# Patient Record
Sex: FEMALE | Race: WHITE | NOT HISPANIC OR LATINO | Employment: OTHER | URBAN - METROPOLITAN AREA
[De-identification: names, ages, dates, MRNs, and addresses within clinical notes are randomized per-mention and may not be internally consistent; named-entity substitution may affect disease eponyms.]

---

## 2017-01-18 DIAGNOSIS — I10 ESSENTIAL HYPERTENSION: ICD-10-CM

## 2017-01-24 RX ORDER — AMLODIPINE BESYLATE 2.5 MG/1
TABLET ORAL
Qty: 90 TAB | Refills: 3 | Status: SHIPPED | OUTPATIENT
Start: 2017-01-24 | End: 2018-11-27 | Stop reason: SDUPTHER

## 2017-02-13 DIAGNOSIS — I10 ESSENTIAL HYPERTENSION: ICD-10-CM

## 2017-04-25 ENCOUNTER — OFFICE VISIT (OUTPATIENT)
Dept: VASCULAR LAB | Facility: MEDICAL CENTER | Age: 57
End: 2017-04-25
Attending: INTERNAL MEDICINE
Payer: COMMERCIAL

## 2017-04-25 VITALS
DIASTOLIC BLOOD PRESSURE: 80 MMHG | HEIGHT: 67 IN | BODY MASS INDEX: 26.13 KG/M2 | WEIGHT: 166.5 LBS | SYSTOLIC BLOOD PRESSURE: 126 MMHG | HEART RATE: 61 BPM

## 2017-04-25 DIAGNOSIS — I77.71 CAROTID ARTERY DISSECTION (HCC): ICD-10-CM

## 2017-04-25 DIAGNOSIS — E78.5 DYSLIPIDEMIA: ICD-10-CM

## 2017-04-25 DIAGNOSIS — I10 ESSENTIAL HYPERTENSION: ICD-10-CM

## 2017-04-25 PROCEDURE — 99212 OFFICE O/P EST SF 10 MIN: CPT | Performed by: NURSE PRACTITIONER

## 2017-04-25 PROCEDURE — 99213 OFFICE O/P EST LOW 20 MIN: CPT | Performed by: NURSE PRACTITIONER

## 2017-04-25 ASSESSMENT — ENCOUNTER SYMPTOMS
BLOOD IN STOOL: 0
FOCAL WEAKNESS: 0
PALPITATIONS: 0
NERVOUS/ANXIOUS: 0
HEMOPTYSIS: 0
COUGH: 0
DIZZINESS: 0
HEADACHES: 0
BRUISES/BLEEDS EASILY: 0
SHORTNESS OF BREATH: 0
LOSS OF CONSCIOUSNESS: 0
DEPRESSION: 0
WHEEZING: 0
MYALGIAS: 0

## 2017-04-25 NOTE — MR AVS SNAPSHOT
"Juan Luis Rolon   2017 1:40 PM   Office Visit   MRN: [id number]    Department:  Vascular Medicine   Dept Phone:  [phone]    Description:  Female : 1960   Provider:  ARI Person           Reason for Visit     Follow-Up           Allergies as of 2017     Allergen Noted Reactions    Codeine 2013   Vomiting    Morphine 2013   Vomiting    Plavix [Clopidogrel Bisulfate] 2013   Hives    Warfarin 2014   Hives      You were diagnosed with     Carotid artery dissection (CMS-HCC)   [173378]       Essential hypertension   [9003806]       Dyslipidemia   [446588]         Vital Signs     Blood Pressure Pulse Height Weight Body Mass Index Smoking Status    126/80 mmHg 61 1.702 m (5' 7.01\") 75.524 kg (166 lb 8 oz) 26.07 kg/m2 Never Smoker       Basic Information     Date Of Birth Sex Race Ethnicity Preferred Language    1960 Female White Non- English      Your appointments     Oct 24, 2017  1:00 PM   Follow Up Visit with VASCULAR NURSE PRACTITIONER   [facility]  (--)    [address] [place] [id number]   [phone]              Problem List              ICD-10-CM Priority Class Noted - Resolved    Carotid artery dissection (CMS-HCC) I77.71   2014 - Present    Essential hypertension I10   2014 - Present    Dyslipidemia E78.5   2014 - Present      Health Maintenance        Date Due Completion Dates    IMM DTaP/Tdap/Td Vaccine (1 - Tdap) 1977 ---    PAP SMEAR 1979 ---    MAMMOGRAM 1998 ---    COLONOSCOPY 2008 ---            Current Immunizations     No immunizations on file.      Below and/or attached are the medications your provider expects you to take. Review all of your home medications and newly ordered medications with your provider and/or pharmacist. Follow medication instructions as directed by your provider and/or pharmacist. Please keep your " medication list with you and share with your provider. Update the information when medications are discontinued, doses are changed, or new medications (including over-the-counter products) are added; and carry medication information at all times in the event of emergency situations     Allergies:  CODEINE - Vomiting     MORPHINE - Vomiting     PLAVIX - Hives     WARFARIN - Hives               Medications  Valid as of: April 25, 2017 -  2:06 PM    Generic Name Brand Name Tablet Size Instructions for use    Acyclovir (Tab) ZOVIRAX 800 MG         Adapalene (Gel) Adapalene 0.3 %         ALPRAZolam (Tab) XANAX 1 MG         AmLODIPine Besylate (Tab) NORVASC 2.5 MG TAKE 1 TABLET BY MOUTH EVERY DAY        Aspirin (Tab)  MG Take 325 mg by mouth every 6 hours as needed.        Atenolol (Tab) TENORMIN 50 MG TAKE 1 TABLET BY MOUTH DAILY        BuPROPion HCl (TABLET SR 12 HR) WELLBUTRIN- MG Take 150 mg by mouth every day.        Clindamycin Phosphate (Gel) CLEOCIN T 1 %         Fluticasone Propionate (Suspension) FLONASE 50 MCG/ACT         L-Methylfolate-Algae (Cap) DEPLIN 15 15-90.314 MG         Losartan Potassium (Tab) COZAAR 25 MG Take 1 Tab by mouth every day.        Omega-3 Fatty Acids (Cap) OMEGA 3 FA 1000 MG Take 1,000 mg by mouth every day.        Penciclovir (Cream) DENAVIR 1 %         Policos-Garlic-Plant Sterols   Take 2 Caps by mouth every day.        Red Yeast Rice Extract   Take  by mouth every day.        Spironolactone-HCTZ (Tab) ALDACTAZIDE 25-25 MG Take 0.5 Tabs by mouth every day.        Terazosin HCl (Cap) HYTRIN 1 MG Take 1 Cap by mouth every day.        Zolpidem Tartrate (Tab CR) AMBIEN CR 12.5 MG         .                 Medicines prescribed today were sent to:     [facility][id number] - [place], [place] - [address]    [address] [facility] [address]    Phone: [phone] Fax: [phone]    Open 24 Hours?: No      Medication refill instructions:       If your prescription  bottle indicates you have medication refills left, it is not necessary to call your provider’s office. Please contact your pharmacy and they will refill your medication.    If your prescription bottle indicates you do not have any refills left, you may request refills at any time through one of the following ways: The online [employer] system (except Urgent Care), by calling your provider’s office, or by asking your pharmacy to contact your provider’s office with a refill request. Medication refills are processed only during regular business hours and may not be available until the next business day. Your provider may request additional information or to have a follow-up visit with you prior to refilling your medication.   *Please Note: Medication refills are assigned a new Rx number when refilled electronically. Your pharmacy may indicate that no refills were authorized even though a new prescription for the same medication is available at the pharmacy. Please request the medicine by name with the pharmacy before contacting your provider for a refill.        Your To Do List     Future Labs/Procedures Complete By Expires    CAROTID DUPLEX  10/1/2017 10/31/2017    COMP METABOLIC PANEL  As directed 4/25/2018    LIPID PROFILE  As directed 4/25/2018         [employer] Access Code: Activation code not generated  Current [employer] Status: Active

## 2017-04-25 NOTE — PROGRESS NOTES
"VASCULAR FOLLOW UP VISIT  Subjective:   Juan Luis Rolon is a 57 y.o. female who presents today 4/25/17 for   Chief Complaint   Patient presents with   • Follow-Up     HPI:   Pt presents for f/u on carotid disease with dissection, HTN, dyslipidemia, and previous TIA.    No c/o visual changes or stroke symptoms   No chest pain, SOB, palpitations, or LE swelling  Has never had migraines since TIA  Home BP's 120/70's, HR 60's- takes them rarely  Continues on red rice yeast, without myalgias  No labwork    Social History   Substance Use Topics   • Smoking status: Never Smoker    • Smokeless tobacco: Never Used   • Alcohol Use: 3.5 oz/week     7 Glasses of wine per week     DIET AND EXERCISE:  Weight Change: down 9 lbs in past 6mo  Diet: common adult  Exercise: moderate regular exercise program- [employer]     Review of Systems   Constitutional: Negative for malaise/fatigue.   HENT: Negative for nosebleeds.    Respiratory: Negative for cough, hemoptysis, shortness of breath and wheezing.    Cardiovascular: Negative for chest pain, palpitations and leg swelling.   Gastrointestinal: Negative for blood in stool.   Genitourinary: Negative for hematuria.   Musculoskeletal: Negative for myalgias.   Neurological: Negative for dizziness, focal weakness, loss of consciousness and headaches.   Endo/Heme/Allergies: Does not bruise/bleed easily.   Psychiatric/Behavioral: Negative for depression. The patient is not nervous/anxious.       Objective:     Filed Vitals:    04/25/17 1353   BP: 126/80   Pulse: 61   Height: 1.702 m (5' 7.01\")   Weight: 75.524 kg (166 lb 8 oz)      Body mass index is 26.07 kg/(m^2).  Physical Exam   Constitutional: She is oriented to person, place, and time. She appears well-developed and well-nourished.   Cardiovascular: Normal rate, regular rhythm, normal heart sounds and intact distal pulses.    No murmur heard.  Pulmonary/Chest: Effort normal and breath sounds normal. No respiratory distress. She has no " wheezes.   Musculoskeletal: Normal range of motion. She exhibits no edema.   Neurological: She is alert and oriented to person, place, and time.   Skin: Skin is warm and dry.   Psychiatric: She has a normal mood and affect. Her behavior is normal. Thought content normal.     DATA REVIEW    Labwork 6-24-14: LDL-C, 86, tsh 2.27, urine for ma 0.5, GFR > 60, HDL 73, trig 127    Labwork 6-23-15: LDL-C, 97, HDL-C, 79, Trig- 242, Non HDL-145, TSH-1.92, Na-128, K-4.2, Bun/Cr, 15/0.8, ALT 29, AST 41, Microalbumin-1.4, Urine Creatinine, 37.4          Carotid Duplex 12-5-2014  1. Chronic occlusion of the left internal carotid artery at its origin. Patent left vertebral artery.  2. Minimal heterogeneous plaque in the right carotid bulb and proximal right internal carotid artery. No right internal carotid artery rate limiting stenosis.    Carotid duplex 8-  CONCLUSIONS  1. Normal right carotid duplex examination.   2. Left internal carotid occlusion.   3. Normal bilateral subclavian and vertebral arterial exam.    CTA Oct 2015:   1. Left internal carotid artery occlusion  2. CTA [place] otherwise within normal limits  3. Probable left deep gray matter venous angioma, incidental  4. Underlying white matter changes are likely from previous small vessel ischemia/infarct    Blood work Oct 2015  LDL 91, nonHdl 143, gfr 74, na 134    CTA October 2016  Persistent occlusion left internal carotid artery  Inferior left basal ganglia and medial left temporal lobe venous angioma  Small vessel disease    Blood work September 2016-sodium 136, potassium 4.6, ALT 19, AST 26, total social to 24, temperature it's 237, HDL 70, , TSH 1.47, free T4 0.89          Medical Decision Making:  Today's Assessment / Status / Plan:     1. Carotid artery dissection (CMS-HCC)  CBC, NO DIFFERENTIAL/PLATELET (HEMOGRAM)    CAROTID DUPLEX   2. Essential hypertension  COMP METABOLIC PANEL    CBC, NO DIFFERENTIAL/PLATELET (HEMOGRAM)   3.  "Dyslipidemia  LIPID PROFILE     Patient Type: Secondary Prevention    Etiology of Established CVD if Present:   1) Carotid artery dissection  2) Presumed TIA, June 2015    Lipid Management: Qualifies for Statin Therapy Based on 2013 ACC/AHA Guidelines: yes  Calculated 10-Year Risk of ASCVD: N/A  Currently on Statin: No  Patient qualifies for statin therapy based on ACC/AHA guidelines as well as presence of vascular disease, however,she states she has not tolerated statins well in past, and refuses any additional medications for lipid management.  Had myalgias on statins in the past.     Previous lipid panel shows only modestly increased non-HDL  - Continue TLC   - Continue red rice yeast  - Repeat fasting lipids     Blood Pressure Management:Goal: JNC8 (2013) Office BP Goal:<140/90; Under Control: Yes-   Good control on previous abpm  No secondary cause identified.    Patient stopped Diovan weeks ago due to fogginess and dizziness - tolerates low dose losartan  Had leg swelling on higher doses of Norvasc.  Plan:  - Continue atenolol 50 mg daily  - Continue amlodipine 2.5 mg daily  - Continue spironolactone hydrochlorothiazide one half pill daily  - Continue terazosin 1mg qhs  - Continue losartan 25 mg daily (patient does not want to increase dose)  - Continue home BP monitoring - call if elevated    Glycemic Status: Normal    Anti-Platelet/Anti-Coagulant Tx: yes  Patient states she cannot tolerate clopidogrel or aggrenox  - continue aspirin daily    Smoking: continue complete avoidance     Physical Activity: Continue training regimen with cross-fit    Weight Management and Nutrition: Dietary plan was discussed at length with patient at this visit including DASH Diet, choosing whole grains/whole wheats over \"white flours,\" continued fish, choosing healthy fats, and avoiding trans fats.    Other:     1. Carotid dissection with history of TIA and unilateral carotid occlusion, workup for vasculitis by rheumatology was " negative and no evidence of fibromuscular dysplasia in renal arteries. Continue antiplatelet therapy, risk factor modification, and surveillance.   2.  TIA  with occular symptoms- No recurrence.  Continue asa and RF mod  3. Atypical chest pain with negative workup including workup for ischemia and CT scan of the chest. Symptoms have resolved. No further workup indicated at this time   4. Ocular migraine, improved-continue conservative therapy   5. Anxiety and multiple psychosocial issues- defer management to PCP and psychologists/psychiatrist  6.  Hyponatremia, previous significant improvement with reduction in diuretic dose. Continue current dose of diuretic and recheck BMP  7.  Venous angioma-Asymptomatic. Stable on CTA    Instructed to follow-up with PCP for remainder of adult medical needs: yes  We will partner with other providers in the management of established vascular disease and cardiometabolic risk factors.    Studies to Be Obtained: Carotid duplex Oct 2017-ordered today    Labs to Be Obtained: lipids, CMP, CBC    Follow up in: 6 months    ARI Person     CC:  SOPHIE Burton MD

## 2017-08-30 ENCOUNTER — TELEPHONE (OUTPATIENT)
Dept: VASCULAR LAB | Facility: MEDICAL CENTER | Age: 57
End: 2017-08-30

## 2017-08-30 DIAGNOSIS — I77.71 CAROTID ARTERY DISSECTION (HCC): ICD-10-CM

## 2017-10-16 ENCOUNTER — HOSPITAL ENCOUNTER (OUTPATIENT)
Dept: RADIOLOGY | Facility: MEDICAL CENTER | Age: 57
End: 2017-10-16
Attending: NURSE PRACTITIONER
Payer: COMMERCIAL

## 2017-10-16 DIAGNOSIS — I77.71 CAROTID ARTERY DISSECTION (HCC): ICD-10-CM

## 2017-10-16 PROCEDURE — 93880 EXTRACRANIAL BILAT STUDY: CPT

## 2017-10-24 ENCOUNTER — OFFICE VISIT (OUTPATIENT)
Dept: VASCULAR LAB | Facility: MEDICAL CENTER | Age: 57
End: 2017-10-24
Attending: INTERNAL MEDICINE
Payer: COMMERCIAL

## 2017-10-24 VITALS
HEIGHT: 67 IN | DIASTOLIC BLOOD PRESSURE: 68 MMHG | SYSTOLIC BLOOD PRESSURE: 108 MMHG | WEIGHT: 164 LBS | HEART RATE: 60 BPM | BODY MASS INDEX: 25.74 KG/M2

## 2017-10-24 DIAGNOSIS — I10 ESSENTIAL HYPERTENSION: ICD-10-CM

## 2017-10-24 PROCEDURE — 99212 OFFICE O/P EST SF 10 MIN: CPT | Performed by: NURSE PRACTITIONER

## 2017-10-24 PROCEDURE — 99213 OFFICE O/P EST LOW 20 MIN: CPT | Performed by: NURSE PRACTITIONER

## 2017-10-24 ASSESSMENT — ENCOUNTER SYMPTOMS
FOCAL WEAKNESS: 0
LOSS OF CONSCIOUSNESS: 0
HEADACHES: 0
WEIGHT LOSS: 0
WHEEZING: 0
BRUISES/BLEEDS EASILY: 0
COUGH: 0
DIZZINESS: 0
SHORTNESS OF BREATH: 0
PALPITATIONS: 0
NERVOUS/ANXIOUS: 0
BLOOD IN STOOL: 0
MYALGIAS: 0

## 2017-10-24 NOTE — PROGRESS NOTES
"VASCULAR FOLLOW UP VISIT  Subjective:   Juan Luis Rolon is a 57 y.o. female who presents today 10/24/17 for   Chief Complaint   Patient presents with   • Follow-Up     HPI:   Pt presents for f/u on carotid disease with dissection, HTN, dyslipidemia, and previous TIA.    She has no new labwork since last appt  No c/o visual changes or stroke-like symptoms   No chest pain, SOB, palpitations, or LE swelling  She continues to workout daily at [employer]  Home BP's 120/70's, although she does take them often  Continues on red rice yeast without myalgias  On ASA without bleeding/bruising  Had carotid u/s earlier this month    Social History   Substance Use Topics   • Smoking status: Never Smoker   • Smokeless tobacco: Never Used   • Alcohol use 3.5 oz/week     7 Glasses of wine per week     DIET AND EXERCISE:  Weight Change: stable  Diet: common adult  Exercise: moderate regular exercise program- [employer] Fit     Review of Systems   Constitutional: Negative for malaise/fatigue and weight loss.   HENT: Negative for nosebleeds.    Respiratory: Negative for cough, shortness of breath and wheezing.    Cardiovascular: Negative for chest pain, palpitations and leg swelling.   Gastrointestinal: Negative for blood in stool.   Genitourinary: Negative for hematuria.   Musculoskeletal: Negative for myalgias.   Neurological: Negative for dizziness, focal weakness, loss of consciousness and headaches.   Endo/Heme/Allergies: Does not bruise/bleed easily.   Psychiatric/Behavioral: The patient is not nervous/anxious.       Objective:     Vitals:    10/24/17 1254 10/24/17 1259   BP: 115/71 108/68   Pulse: 60 60   Weight: 74.4 kg (164 lb)    Height: 1.702 m (5' 7\")       Body mass index is 25.69 kg/m².  Physical Exam   Constitutional: She is oriented to person, place, and time. She appears well-developed and well-nourished.   Cardiovascular: Normal rate, regular rhythm, normal heart sounds and intact distal pulses.    No murmur " heard.  Pulmonary/Chest: Effort normal and breath sounds normal. No respiratory distress. She has no wheezes.   Musculoskeletal: Normal range of motion. She exhibits no edema.   Neurological: She is alert and oriented to person, place, and time.   Skin: Skin is warm and dry.   Psychiatric: She has a normal mood and affect. Her behavior is normal. Thought content normal.     DATA REVIEW    Labwork 6-24-14: LDL-C, 86, tsh 2.27, urine for ma 0.5, GFR > 60, HDL 73, trig 127    Labwork 6-23-15: LDL-C, 97, HDL-C, 79, Trig- 242, Non HDL-145, TSH-1.92, Na-128, K-4.2, Bun/Cr, 15/0.8, ALT 29, AST 41, Microalbumin-1.4, Urine Creatinine, 37.4          Carotid Duplex 12-5-2014  1. Chronic occlusion of the left internal carotid artery at its origin. Patent left vertebral artery.  2. Minimal heterogeneous plaque in the right carotid bulb and proximal right internal carotid artery. No right internal carotid artery rate limiting stenosis.    Carotid duplex 8-  CONCLUSIONS  1. Normal right carotid duplex examination.   2. Left internal carotid occlusion.   3. Normal bilateral subclavian and vertebral arterial exam.    CTA Oct 2015:   1. Left internal carotid artery occlusion  2. CTA [place] otherwise within normal limits  3. Probable left deep gray matter venous angioma, incidental  4. Underlying white matter changes are likely from previous small vessel ischemia/infarct    Blood work Oct 2015  LDL 91, nonHdl 143, gfr 74, na 134    CTA October 2016  Persistent occlusion left internal carotid artery  Inferior left basal ganglia and medial left temporal lobe venous angioma  Small vessel disease    Blood work September 2016-sodium 136, potassium 4.6, ALT 19, AST 26, total social to 24, temperature it's 237, HDL 70, , TSH 1.47, free T4 0.89    Carotid Duplex 10/16/17:   Occlusion of the left internal carotid artery.   No significant stenosis of the right carotid or bilateral subclavian or    bilateral vertebral  arteries.          Medical Decision Making:  Today's Assessment / Status / Plan:     1. Essential hypertension  TSH+FREE T4     Patient Type: Secondary Prevention    Etiology of Established CVD if Present:   1) Carotid artery dissection  2) Presumed TIA, June 2015    Lipid Management: Qualifies for Statin Therapy Based on 2013 ACC/AHA Guidelines: yes  Calculated 10-Year Risk of ASCVD: N/A  Currently on Statin: No  Patient qualifies for statin therapy based on ACC/AHA guidelines as well as presence of vascular disease, however,she states she has not tolerated statins well in past, and refuses any additional medications for lipid management.  Previous lipid panel shows only modestly increased non-HDL, however, no new labwork for quite some time.  - Continue TLC   - Continue red rice yeast  - Repeat lipid panel once fasting    Blood Pressure Management:Goal: JNC8 (2013) Office BP Goal:<140/90; Under Control: Yes-   Good control on previous abpm  No secondary cause identified  Did not tolerate Diovan due to fogginess and dizziness - tolerates low dose losartan  Had leg swelling on higher doses of Norvasc.  Plan:  - Continue atenolol 50 mg daily  - Continue amlodipine 2.5 mg daily  - Continue spironolactone hydrochlorothiazide one half pill daily  - Continue terazosin 1mg qhs  - Continue losartan 25 mg daily  - Continue home BP monitoring - call if elevated    Glycemic Status: Normal    Anti-Platelet/Anti-Coagulant Tx: yes  Patient states she cannot tolerate clopidogrel or aggrenox  - continue aspirin daily    Smoking: continue complete avoidance     Physical Activity: Continue training regimen with cross-fit    Weight Management and Nutrition: Dietary plan was discussed with patient at this visit including DASH Diet, low carb/low sat fat and avoiding trans fats.    Other:     1. Carotid dissection with history of TIA and unilateral carotid occlusion, workup for vasculitis by rheumatology was negative and no evidence of  fibromuscular dysplasia in renal arteries. Continue antiplatelet therapy, risk factor modification, and surveillance. Most recent duplex shows stable occlusion and no significant stenosis on the right  2.  TIA  with occular symptoms- No recurrence.  Continue asa and RF mod  3. Atypical chest pain with negative workup including workup for ischemia and CT scan of the chest. Symptoms have resolved. No further workup indicated at this time   4. Ocular migraine, improved-continue conservative therapy   5. Anxiety and multiple psychosocial issues- defer management to PCP and psychologists/psychiatrist  6.  Hyponatremia, previous significant improvement with reduction in diuretic dose. Continue current dose of diuretic and recheck electrolytes  7.  Venous angioma-Asymptomatic. Stable on CTA    Instructed to follow-up with PCP for remainder of adult medical needs: yes  We will partner with other providers in the management of established vascular disease and cardiometabolic risk factors.    Studies to Be Obtained: Carotid duplex Oct 2018    Labs to Be Obtained: lipids, CMP, CBC, TSH now    Follow up in: 6 months    ARI Person     CC:  SOPHIE Burton MD

## 2018-03-07 DIAGNOSIS — I10 ESSENTIAL HYPERTENSION: ICD-10-CM

## 2018-03-09 RX ORDER — TERAZOSIN 1 MG/1
CAPSULE ORAL
Qty: 90 CAP | Refills: 3 | Status: SHIPPED | OUTPATIENT
Start: 2018-03-09 | End: 2019-04-05 | Stop reason: SDUPTHER

## 2018-04-23 DIAGNOSIS — I10 ESSENTIAL HYPERTENSION: ICD-10-CM

## 2018-04-23 RX ORDER — LOSARTAN POTASSIUM 25 MG/1
25 TABLET ORAL DAILY
Qty: 90 TAB | Refills: 3 | Status: SHIPPED
Start: 2018-04-23 | End: 2019-04-09 | Stop reason: SDUPTHER

## 2018-04-24 ENCOUNTER — OFFICE VISIT (OUTPATIENT)
Dept: VASCULAR LAB | Facility: MEDICAL CENTER | Age: 58
End: 2018-04-24
Attending: INTERNAL MEDICINE
Payer: COMMERCIAL

## 2018-04-24 VITALS
BODY MASS INDEX: 23.7 KG/M2 | HEART RATE: 58 BPM | SYSTOLIC BLOOD PRESSURE: 127 MMHG | DIASTOLIC BLOOD PRESSURE: 80 MMHG | WEIGHT: 151 LBS | HEIGHT: 67 IN

## 2018-04-24 DIAGNOSIS — E78.5 DYSLIPIDEMIA: ICD-10-CM

## 2018-04-24 DIAGNOSIS — I10 ESSENTIAL HYPERTENSION: ICD-10-CM

## 2018-04-24 DIAGNOSIS — I77.71 CAROTID ARTERY DISSECTION (HCC): ICD-10-CM

## 2018-04-24 PROCEDURE — 99214 OFFICE O/P EST MOD 30 MIN: CPT | Performed by: INTERNAL MEDICINE

## 2018-04-24 ASSESSMENT — ENCOUNTER SYMPTOMS
SHORTNESS OF BREATH: 0
FOCAL WEAKNESS: 0
LOSS OF CONSCIOUSNESS: 0
NERVOUS/ANXIOUS: 0
COUGH: 0
MYALGIAS: 0
PALPITATIONS: 0
DIZZINESS: 0
HEADACHES: 0
DEPRESSION: 0

## 2018-04-24 NOTE — PROGRESS NOTES
"VASCULAR FOLLOW UP VISIT  Subjective:   Juan Luis Rolon is a 57 y.o. female who presents today 4-24-18 for   Chief Complaint   Patient presents with   • Amb Vascular Reason For Visit     Follow up     HPI:   Pt presents for f/u on carotid disease with dissection, HTN, dyslipidemia, and previous TIA.   Takes red rice yeast  No new tia/cva symptoms  No headaches  BP at home usually around 130/75  Still on asa  Some lightheadedness when standing    Social History   Substance Use Topics   • Smoking status: Never Smoker   • Smokeless tobacco: Never Used   • Alcohol use 3.5 oz/week     7 Glasses of wine per week     DIET AND EXERCISE:  Weight Change: stable  Diet: common adult  Exercise: moderate regular exercise program- Cross Fit     Review of Systems   Constitutional: Negative for malaise/fatigue.   Respiratory: Negative for cough and shortness of breath.    Cardiovascular: Negative for chest pain, palpitations and leg swelling.   Musculoskeletal: Negative for myalgias.   Neurological: Negative for dizziness, focal weakness, loss of consciousness and headaches.   Psychiatric/Behavioral: Negative for depression. The patient is not nervous/anxious.       Objective:     Vitals:    04/24/18 0951   BP: 127/80   Pulse: (!) 58   Weight: 68.5 kg (151 lb)   Height: 1.702 m (5' 7\")      Body mass index is 23.65 kg/m².  Physical Exam   Constitutional: She is oriented to person, place, and time. She appears well-developed and well-nourished.   Cardiovascular: Normal rate, regular rhythm, normal heart sounds and intact distal pulses.    No murmur heard.  Pulmonary/Chest: Effort normal and breath sounds normal. No respiratory distress. She has no wheezes.   Musculoskeletal: Normal range of motion. She exhibits no edema.   Neurological: She is alert and oriented to person, place, and time.   Skin: Skin is warm and dry.   Psychiatric: She has a normal mood and affect. Her behavior is normal. Thought content normal.     DATA " REVIEW    Labwork 6-24-14: LDL-C, 86, tsh 2.27, urine for ma 0.5, GFR > 60, HDL 73, trig 127    Labwork 6-23-15: LDL-C, 97, HDL-C, 79, Trig- 242, Non HDL-145, TSH-1.92, Na-128, K-4.2, Bun/Cr, 15/0.8, ALT 29, AST 41, Microalbumin-1.4, Urine Creatinine, 37.4          Carotid Duplex 12-5-2014  1. Chronic occlusion of the left internal carotid artery at its origin. Patent left vertebral artery.  2. Minimal heterogeneous plaque in the right carotid bulb and proximal right internal carotid artery. No right internal carotid artery rate limiting stenosis.    Carotid duplex 8-  CONCLUSIONS  1. Normal right carotid duplex examination.   2. Left internal carotid occlusion.   3. Normal bilateral subclavian and vertebral arterial exam.    CTA Oct 2015:   1. Left internal carotid artery occlusion  2. CTA [place] otherwise within normal limits  3. Probable left deep gray matter venous angioma, incidental  4. Underlying white matter changes are likely from previous small vessel ischemia/infarct    Blood work Oct 2015  LDL 91, nonHdl 143, gfr 74, na 134    CTA October 2016  Persistent occlusion left internal carotid artery  Inferior left basal ganglia and medial left temporal lobe venous angioma  Small vessel disease    Blood work September 2016-sodium 136, potassium 4.6, ALT 19, AST 26, total social to 24, temperature it's 237, HDL 70, , TSH 1.47, free T4 0.89    Carotid Duplex 10/16/17:   Occlusion of the left internal carotid artery.   No significant stenosis of the right carotid or bilateral subclavian or    bilateral vertebral arteries.    Blood work nov 2017 [facility]:  GFR 57, , nonHDL 133, tsh 1.65          Medical Decision Making:  Today's Assessment / Status / Plan:     1. Essential hypertension  COMP METABOLIC PANEL   2. Carotid artery dissection (CMS-HCC)  Carotid Duplex   3. Dyslipidemia  COMP METABOLIC PANEL    LIPID PROFILE    TSH     Patient Type: Secondary Prevention    Etiology of  Established CVD if Present:   1) Carotid artery dissection  2) Presumed TIA, June 2015    Lipid Management: Qualifies for Statin Therapy Based on 2013 ACC/AHA Guidelines: yes  Calculated 10-Year Risk of ASCVD: N/A  Currently on Statin: No  Patient qualifies for statin therapy based on ACC/AHA guidelines given established ASCVD; however, it was non-atherosclerotic  has not tolerated statins well in past, and refuses any additional medications for lipid management.   Lipid panel with fairly decent nonHDL and LDL control  Plan.  - Continue TLC   - Continue red rice yeast  - Repeat lipid panel prior to next visit    Blood Pressure Management:  ACC/AHA BP goal <130/80  Reasonable control both at home and in office  Good control on previous abpm  No secondary cause identified  Did not tolerate Diovan due to fogginess and dizziness - tolerates low dose losartan  Had leg swelling on higher doses of Norvasc.  Plan:  - Continue atenolol 50 mg daily  - Continue amlodipine 2.5 mg daily  - Continue spironolactone hydrochlorothiazide one half pill daily  - Continue terazosin 1mg qhs  - Continue losartan 25 mg daily  - Continue home BP monitoring - call if elevated    Glycemic Status: Normal    Anti-Platelet/Anti-Coagulant Tx: yes  Patient states she cannot tolerate clopidogrel or aggrenox  - continue aspirin daily    Smoking: continue complete avoidance     Physical Activity: Continue training regimen with cross-fit    Weight Management and Nutrition: Dietary plan was discussed with patient at this visit including DASH Diet, low carb/low sat fat and avoiding trans fats.    Other:     1. Carotid dissection with history of TIA and unilateral carotid occlusion, workup for vasculitis by rheumatology was negative and no evidence of fibromuscular dysplasia in renal arteries. Continue antiplatelet therapy, risk factor modification, and surveillance. Most recent duplex shows stable occlusion and no significant stenosis on the right.   Repeat carotid duplex this october - if stable consider decreasing frequency to every 2 years  2.  TIA with occular symptoms- No recurrence.  Continue asa and RF mod  3. Atypical chest pain with negative workup including workup for ischemia and CT scan of the chest. Symptoms have resolved. No further workup indicated at this time   4. Ocular migraine, improved-continue conservative therapy   5.  Venous angioma-Asymptomatic. Stable on CTA.  Consider repeat imaging in future, particularly if headaches return.  Otherwise defer to pcp    Instructed to follow-up with PCP for remainder of adult medical needs: yes  We will partner with other providers in the management of established vascular disease and cardiometabolic risk factors.    Studies to Be Obtained: Carotid duplex Oct 2018    Labs to Be Obtained: as above prior to next visit    Follow up in: November after imaging and labs    Michael J Bloch, M.D.     CC:  SOPHIE Burton MD

## 2018-06-21 ENCOUNTER — TELEPHONE (OUTPATIENT)
Dept: VASCULAR LAB | Facility: MEDICAL CENTER | Age: 58
End: 2018-06-21

## 2018-06-21 NOTE — TELEPHONE ENCOUNTER
"Patient called in stating that her \"arm is bruised\" and her \"memory is foggy\". Spoke with [facility], patient is to go to  or ER right away. Patient states that she will not go to [facility] and can not drive to [place]. Patient will try and go to a [facility]  tomorrow.   "

## 2018-08-02 DIAGNOSIS — I10 ESSENTIAL HYPERTENSION: ICD-10-CM

## 2018-08-07 DIAGNOSIS — I10 ESSENTIAL HYPERTENSION: ICD-10-CM

## 2018-08-07 RX ORDER — ATENOLOL 50 MG/1
50 TABLET ORAL DAILY
Qty: 10 TAB | Refills: 0 | Status: SHIPPED | OUTPATIENT
Start: 2018-08-07 | End: 2019-09-16 | Stop reason: SDUPTHER

## 2018-08-07 RX ORDER — ATENOLOL 50 MG/1
50 TABLET ORAL DAILY
Qty: 90 TAB | Refills: 3 | Status: SHIPPED | OUTPATIENT
Start: 2018-08-07 | End: 2018-08-07 | Stop reason: SDUPTHER

## 2018-10-03 ENCOUNTER — TELEPHONE (OUTPATIENT)
Dept: VASCULAR LAB | Facility: MEDICAL CENTER | Age: 58
End: 2018-10-03

## 2018-10-03 ENCOUNTER — HOSPITAL ENCOUNTER (OUTPATIENT)
Dept: RADIOLOGY | Facility: MEDICAL CENTER | Age: 58
End: 2018-10-03
Attending: INTERNAL MEDICINE
Payer: COMMERCIAL

## 2018-10-03 DIAGNOSIS — I77.71 CAROTID ARTERY DISSECTION (HCC): ICD-10-CM

## 2018-10-03 PROCEDURE — 93880 EXTRACRANIAL BILAT STUDY: CPT

## 2018-10-04 ENCOUNTER — TELEPHONE (OUTPATIENT)
Dept: VASCULAR LAB | Facility: MEDICAL CENTER | Age: 58
End: 2018-10-04

## 2018-10-04 NOTE — TELEPHONE ENCOUNTER
LM on VM to let pt know that Dr. Bloch reviewed the carotid duplex and it looks stable.   Repeat in one year. Phone for office provided. CLARENCE Oleary.

## 2018-10-04 NOTE — TELEPHONE ENCOUNTER
Carotid duplex without change.  Continue medical management and repeat in one year.    Michael Bloch, MD  [facility]

## 2018-11-09 ENCOUNTER — OFFICE VISIT (OUTPATIENT)
Dept: VASCULAR LAB | Facility: MEDICAL CENTER | Age: 58
End: 2018-11-09
Attending: INTERNAL MEDICINE
Payer: COMMERCIAL

## 2018-11-09 VITALS
BODY MASS INDEX: 24.92 KG/M2 | DIASTOLIC BLOOD PRESSURE: 88 MMHG | HEART RATE: 64 BPM | HEIGHT: 67 IN | WEIGHT: 158.8 LBS | SYSTOLIC BLOOD PRESSURE: 157 MMHG

## 2018-11-09 DIAGNOSIS — I10 ESSENTIAL HYPERTENSION: ICD-10-CM

## 2018-11-09 DIAGNOSIS — E78.5 DYSLIPIDEMIA: ICD-10-CM

## 2018-11-09 DIAGNOSIS — I77.71 CAROTID ARTERY DISSECTION (HCC): ICD-10-CM

## 2018-11-09 PROCEDURE — 99214 OFFICE O/P EST MOD 30 MIN: CPT | Performed by: NURSE PRACTITIONER

## 2018-11-09 PROCEDURE — 99212 OFFICE O/P EST SF 10 MIN: CPT | Performed by: NURSE PRACTITIONER

## 2018-11-09 ASSESSMENT — ENCOUNTER SYMPTOMS
SHORTNESS OF BREATH: 0
BLOOD IN STOOL: 0
COUGH: 0
LOSS OF CONSCIOUSNESS: 0
NERVOUS/ANXIOUS: 0
PALPITATIONS: 1
MYALGIAS: 0
DIZZINESS: 0
DEPRESSION: 1
HEADACHES: 0
WHEEZING: 0
BRUISES/BLEEDS EASILY: 0
FOCAL WEAKNESS: 0
WEIGHT LOSS: 0

## 2018-11-09 NOTE — PROGRESS NOTES
"VASCULAR FOLLOW UP VISIT  Subjective:   Juan Luis Rolon is a 58 y.o. female who presents today 18 for   Chief Complaint   Patient presents with   • Follow-Up     HPI: Pt presents for f/u of carotid disease with dissection, HTN, dyslipidemia, and previous TIA.   No real complaints today  Has been under a lot of stress lately; her dog  and she is grieving  Home BP's 130/80s  Continues to take red yeast rice; no myalgias  No new tia/cva symptoms  Does have some lightheadedness with posture change  No headaches  No chest pain, SOB, or LE swelling  Has occasional palpitations  Still on asa; no bleeding  Had carotid imaging and fasting labwork done    Social History   Substance Use Topics   • Smoking status: Never Smoker   • Smokeless tobacco: Never Used   • Alcohol use 3.5 oz/week     7 Glasses of wine per week     DIET AND EXERCISE:  Weight Change: up a bit  Diet: common adult  Exercise: moderate regular exercise program- Cross Fit     Review of Systems   Constitutional: Negative for malaise/fatigue and weight loss.   Respiratory: Negative for cough, shortness of breath and wheezing.    Cardiovascular: Positive for palpitations. Negative for chest pain and leg swelling.   Gastrointestinal: Negative for blood in stool.   Genitourinary: Negative for hematuria.   Musculoskeletal: Negative for myalgias.   Neurological: Negative for dizziness, focal weakness, loss of consciousness and headaches.   Endo/Heme/Allergies: Does not bruise/bleed easily.   Psychiatric/Behavioral: Positive for depression. The patient is not nervous/anxious.       Objective:     Vitals:    18 1311   BP: 157/88   BP Location: Left arm   Patient Position: Sitting   BP Cuff Size: Adult   Pulse: 64   Weight: 72 kg (158 lb 12.8 oz)   Height: 1.702 m (5' 7\")      Body mass index is 24.87 kg/m².  Physical Exam   Constitutional: She is oriented to person, place, and time. She appears well-developed and well-nourished.   Cardiovascular: Normal " rate, regular rhythm, normal heart sounds and intact distal pulses.    No murmur heard.  Pulmonary/Chest: Effort normal and breath sounds normal. No respiratory distress. She has no wheezes.   Musculoskeletal: Normal range of motion. She exhibits no edema.   Neurological: She is alert and oriented to person, place, and time.   Skin: Skin is warm and dry.   Psychiatric: She has a normal mood and affect. Her behavior is normal.     DATA REVIEW    Labwork 6-24-14: LDL-C, 86, tsh 2.27, urine for ma 0.5, GFR > 60, HDL 73, trig 127    Labwork 6-23-15: LDL-C, 97, HDL-C, 79, Trig- 242, Non HDL-145, TSH-1.92, Na-128, K-4.2, Bun/Cr, 15/0.8, ALT 29, AST 41, Microalbumin-1.4, Urine Creatinine, 37.4          Carotid Duplex 12-5-2014  1. Chronic occlusion of the left internal carotid artery at its origin. Patent left vertebral artery.  2. Minimal heterogeneous plaque in the right carotid bulb and proximal right internal carotid artery. No right internal carotid artery rate limiting stenosis.    Carotid duplex 8-  CONCLUSIONS  1. Normal right carotid duplex examination.   2. Left internal carotid occlusion.   3. Normal bilateral subclavian and vertebral arterial exam.    CTA Oct 2015:   1. Left internal carotid artery occlusion  2. CTA [place] otherwise within normal limits  3. Probable left deep gray matter venous angioma, incidental  4. Underlying white matter changes are likely from previous small vessel ischemia/infarct    Blood work Oct 2015  LDL 91, nonHdl 143, gfr 74, na 134    CTA October 2016  Persistent occlusion left internal carotid artery  Inferior left basal ganglia and medial left temporal lobe venous angioma  Small vessel disease    Blood work September 2016-sodium 136, potassium 4.6, ALT 19, AST 26, total social to 24, temperature it's 237, HDL 70, , TSH 1.47, free T4 0.89    Carotid Duplex 10/16/17:   Occlusion of the left internal carotid artery.   No significant stenosis of the right  carotid or bilateral subclavian or    bilateral vertebral arteries.    Blood work nov 2017 [facility]:  GFR 57, , nonHDL 133, tsh 1.65    Blood work 10/12/2018: NonHDL-139, TG-219, LDL-95, Gluc 93, GFR>90          Medical Decision Making:  Today's Assessment / Status / Plan:     1. Carotid artery dissection (HCC)  REFERRAL TO VASCULAR MEDICINE Reason for Referral? Established Vascular Disease    LIPID PANEL   2. Essential hypertension  REFERRAL TO VASCULAR MEDICINE Reason for Referral? Established Vascular Disease    COMP METABOLIC PANEL   3. Dyslipidemia  REFERRAL TO VASCULAR MEDICINE Reason for Referral? Established Vascular Disease    LIPID PANEL     Patient Type: Secondary Prevention    Etiology of Established CVD if Present:   1) Carotid artery dissection  2) Presumed TIA, June 2015    Lipid Management: Qualifies for Statin Therapy Based on 2013 ACC/AHA Guidelines: yes  Calculated 10-Year Risk of ASCVD: N/A  Currently on Statin: No  Patient qualifies for statin therapy based on ACC/AHA guidelines given established ASCVD; however, it was non-atherosclerotic  Has not tolerated statins well in past, and refuses any additional medications for lipid management  Lipid panel with fairly decent nonHDL and LDL control: 10/12/18 NonHDL-139, LDL-95  Plan.  - Intensify TLC - discussed dietary change for decreasing TG  - Continue red yeast rice  - Repeat lipid panel prior to next visit    Blood Pressure Management:  ACC/AHA BP goal <130/80  Reasonable control both at home; higher in office today.  Pt states this is d/t stress; refused rpt reading  Good control on previous abpm  No secondary cause identified  Did not tolerate Diovan due to fogginess and dizziness - tolerates low dose losartan  Had leg swelling on higher doses of Norvasc  Pt is reluctant to make any dose changes d/t SE's at higher doses  Plan:  - Continue atenolol 50 mg daily  - Continue amlodipine 2.5 mg daily  - Continue spironolactone  hydrochlorothiazide one half pill daily  - Continue terazosin 1mg qhs  - Continue losartan 25 mg daily  - Continue home BP monitoring - call if elevated    Glycemic Status: Normal    Anti-Platelet/Anti-Coagulant Tx: yes  Patient states she cannot tolerate clopidogrel or aggrenox  - continue aspirin daily    Smoking: continue complete avoidance     Physical Activity: Continue training regimen with cross-fit    Weight Management and Nutrition: Dietary plan was discussed with patient at this visit including DASH diet, low carb/low sat fat and avoiding trans fats.    Other:     1. Carotid dissection with history of TIA and unilateral carotid occlusion, workup for vasculitis by rheumatology was negative and no evidence of fibromuscular dysplasia in renal arteries. Continue antiplatelet therapy, risk factor modification, and surveillance. Most recent duplex shows stable occlusion and no significant stenosis on the right.  Continue surveillance  2. TIA with occular symptoms- No recurrence.  Continue asa and RF mod  3. Venous angioma-Asymptomatic. Stable on CTA.  Consider repeat imaging in future, particularly if headaches return.  Otherwise defer to pcp    Instructed to follow-up with PCP for remainder of adult medical needs: yes  We will partner with other providers in the management of established vascular disease and cardiometabolic risk factors.    Studies to Be Obtained: Carotid duplex Oct 2019    Labs to Be Obtained: CMP, lipids    Follow up in: 6mo    Rina Saenz, A.P.N.     CC:  SOPHIE Burton MD

## 2018-11-16 DIAGNOSIS — I10 ESSENTIAL HYPERTENSION: ICD-10-CM

## 2018-11-16 RX ORDER — AMLODIPINE BESYLATE 2.5 MG/1
2.5 TABLET ORAL
Qty: 90 TAB | Refills: 3 | Status: CANCELLED | OUTPATIENT
Start: 2018-11-16

## 2018-11-27 DIAGNOSIS — I10 ESSENTIAL HYPERTENSION: ICD-10-CM

## 2018-11-27 RX ORDER — AMLODIPINE BESYLATE 2.5 MG/1
2.5 TABLET ORAL
Qty: 90 TAB | Refills: 3 | Status: SHIPPED | OUTPATIENT
Start: 2018-11-27 | End: 2019-12-30

## 2019-04-09 DIAGNOSIS — I10 ESSENTIAL HYPERTENSION: ICD-10-CM

## 2019-04-10 RX ORDER — LOSARTAN POTASSIUM 25 MG/1
TABLET ORAL
Qty: 90 TAB | Refills: 1 | Status: SHIPPED | OUTPATIENT
Start: 2019-04-10 | End: 2019-04-11 | Stop reason: SDUPTHER

## 2019-04-11 DIAGNOSIS — I10 ESSENTIAL HYPERTENSION: ICD-10-CM

## 2019-04-11 RX ORDER — LOSARTAN POTASSIUM 25 MG/1
TABLET ORAL
Qty: 90 TAB | Refills: 1 | Status: SHIPPED | OUTPATIENT
Start: 2019-04-11 | End: 2019-10-28

## 2019-06-12 ENCOUNTER — OFFICE VISIT (OUTPATIENT)
Dept: VASCULAR LAB | Facility: MEDICAL CENTER | Age: 59
End: 2019-06-12
Attending: NURSE PRACTITIONER
Payer: COMMERCIAL

## 2019-06-12 VITALS
BODY MASS INDEX: 25.43 KG/M2 | HEIGHT: 67 IN | SYSTOLIC BLOOD PRESSURE: 141 MMHG | WEIGHT: 162 LBS | HEART RATE: 58 BPM | DIASTOLIC BLOOD PRESSURE: 83 MMHG

## 2019-06-12 DIAGNOSIS — I77.71 CAROTID ARTERY DISSECTION (HCC): ICD-10-CM

## 2019-06-12 DIAGNOSIS — R42 DIZZINESS ON STANDING: ICD-10-CM

## 2019-06-12 DIAGNOSIS — Z86.73 HX OF TIA (TRANSIENT ISCHEMIC ATTACK) AND STROKE: ICD-10-CM

## 2019-06-12 DIAGNOSIS — I65.22 CAROTID OCCLUSION, LEFT: ICD-10-CM

## 2019-06-12 DIAGNOSIS — I10 ESSENTIAL HYPERTENSION: ICD-10-CM

## 2019-06-12 PROCEDURE — 99214 OFFICE O/P EST MOD 30 MIN: CPT | Performed by: NURSE PRACTITIONER

## 2019-06-12 PROCEDURE — 99212 OFFICE O/P EST SF 10 MIN: CPT | Performed by: NURSE PRACTITIONER

## 2019-06-12 ASSESSMENT — ENCOUNTER SYMPTOMS
PALPITATIONS: 1
NERVOUS/ANXIOUS: 0
COUGH: 0
SPEECH CHANGE: 0
DOUBLE VISION: 0
HEADACHES: 0
DIZZINESS: 1
MYALGIAS: 0
WHEEZING: 0
BRUISES/BLEEDS EASILY: 1
WEIGHT LOSS: 0
BLURRED VISION: 0
FALLS: 0
LOSS OF CONSCIOUSNESS: 0
FOCAL WEAKNESS: 0
BLOOD IN STOOL: 0
SHORTNESS OF BREATH: 0

## 2019-06-12 NOTE — PROGRESS NOTES
"VASCULAR FOLLOW UP VISIT  Subjective:   Juan Luis Rolon is a 59 y.o. female who presents today 6/12/19 for   Chief Complaint   Patient presents with   • Follow-Up     HPI: Pt presents for f/u of carotid disease with dissection + unilateral occlusion, HTN, dyslipidemia, and previous TIA.   Home BPs mostly 130/70s; does not check HR- using wrist cuff  Has occasional palpitations  C/o dizziness upon standing; no syncope or near syncope, but does feel dizziness has increased over time  Continues to take red yeast rice; no myalgias  No headaches  No chest pain, SOB, or LE swelling  Still on asa; no bleeding  Did fasting labwork last mo  Drives here from [place]  Follows with PCP    Social History   Substance Use Topics   • Smoking status: Never Smoker   • Smokeless tobacco: Never Used   • Alcohol use 3.5 oz/week     7 Glasses of wine per week     DIET AND EXERCISE:  Weight Change: up a bit  Diet: common adult  Exercise: moderate regular exercise program- Cross Fit     Review of Systems   Constitutional: Negative for malaise/fatigue and weight loss.   HENT: Negative for nosebleeds.    Eyes: Negative for blurred vision and double vision.   Respiratory: Negative for cough, shortness of breath and wheezing.    Cardiovascular: Positive for palpitations. Negative for chest pain and leg swelling.   Gastrointestinal: Negative for blood in stool.   Genitourinary: Negative for hematuria.   Musculoskeletal: Negative for falls and myalgias.   Skin: Negative for rash.   Neurological: Positive for dizziness. Negative for speech change, focal weakness, loss of consciousness and headaches.   Endo/Heme/Allergies: Bruises/bleeds easily.   Psychiatric/Behavioral: The patient is not nervous/anxious.       Objective:     Vitals:    06/12/19 0841   BP: 141/83   BP Location: Left arm   Patient Position: Sitting   BP Cuff Size: Adult   Pulse: (!) 58   Weight: 73.5 kg (162 lb)   Height: 1.702 m (5' 7\")      Body mass index is 25.37 " kg/m².  Physical Exam   Constitutional: She is oriented to person, place, and time. She appears well-developed and well-nourished.   Eyes: Pupils are equal, round, and reactive to light.   Neck: No JVD present.   Cardiovascular: Normal rate, regular rhythm, normal heart sounds and intact distal pulses.    No murmur heard.  Pulmonary/Chest: Effort normal and breath sounds normal. No respiratory distress. She has no wheezes.   Musculoskeletal: Normal range of motion. She exhibits no edema.   Neurological: She is alert and oriented to person, place, and time.   Skin: Skin is warm and dry.   Psychiatric: She has a normal mood and affect. Her behavior is normal.     DATA REVIEW    Labwork 6-24-14: LDL-C, 86, tsh 2.27, urine for ma 0.5, GFR > 60, HDL 73, trig 127    Labwork 6-23-15: LDL-C, 97, HDL-C, 79, Trig- 242, Non HDL-145, TSH-1.92, Na-128, K-4.2, Bun/Cr, 15/0.8, ALT 29, AST 41, Microalbumin-1.4, Urine Creatinine, 37.4          Carotid Duplex 12-5-2014  1. Chronic occlusion of the left internal carotid artery at its origin. Patent left vertebral artery.  2. Minimal heterogeneous plaque in the right carotid bulb and proximal right internal carotid artery. No right internal carotid artery rate limiting stenosis.    Carotid duplex 8-  CONCLUSIONS  1. Normal right carotid duplex examination.   2. Left internal carotid occlusion.   3. Normal bilateral subclavian and vertebral arterial exam.    CTA Oct 2015:   1. Left internal carotid artery occlusion  2. CTA [place] otherwise within normal limits  3. Probable left deep gray matter venous angioma, incidental  4. Underlying white matter changes are likely from previous small vessel ischemia/infarct    Blood work Oct 2015  LDL 91, nonHdl 143, gfr 74, na 134    CTA October 2016  Persistent occlusion left internal carotid artery  Inferior left basal ganglia and medial left temporal lobe venous angioma  Small vessel disease    Blood work September 2016-sodium  136, potassium 4.6, ALT 19, AST 26, total social to 24, temperature it's 237, HDL 70, , TSH 1.47, free T4 0.89    Carotid Duplex 10/16/17:   Occlusion of the left internal carotid artery.   No significant stenosis of the right carotid or bilateral subclavian or    bilateral vertebral arteries.    Blood work nov 2017 [facility]:  GFR 57, , nonHDL 133, tsh 1.65    Blood work 10/12/2018: NonHDL-139, TG-219, LDL-95, Gluc 93, GFR>90    Blood work 5/8/19:  GFR-80, cmp WNL, NonHDL-125, LDL-93,           Medical Decision Making:  Today's Assessment / Status / Plan:     1. Essential hypertension  Comp Metabolic Panel    US-CAROTID DOPPLER BILAT    CANCELED: US-CAROTID DOPPLER BILAT   2. Carotid artery dissection (HCC)  Comp Metabolic Panel    US-CAROTID DOPPLER BILAT    CANCELED: US-CAROTID DOPPLER BILAT   3. Dizziness on standing  Comp Metabolic Panel    US-CAROTID DOPPLER BILAT    CANCELED: US-CAROTID DOPPLER BILAT   4. Carotid occlusion, left  Comp Metabolic Panel    US-CAROTID DOPPLER BILAT    CANCELED: US-CAROTID DOPPLER BILAT   5. Hx of TIA (transient ischemic attack) and stroke  Comp Metabolic Panel    US-CAROTID DOPPLER BILAT    CANCELED: US-CAROTID DOPPLER BILAT     Patient Type: Secondary Prevention    Etiology of Established CVD if Present:   1) Carotid artery dissection  2) Presumed TIA, June 2015    Lipid Management: Qualifies for Statin Therapy Based on 2013 ACC/AHA Guidelines: yes  Calculated 10-Year Risk of ASCVD: N/A  Currently on Statin: No  Patient qualifies for statin therapy based on ACC/AHA guidelines given established ASCVD; however, it was non-atherosclerotic  Has not tolerated statins well in past, and refuses any additional medications for lipid management  Lipid panel with fairly decent, and improved nonHDL and LDL control: 5/8/19 NonHDL-125, LDL-93  Plan.  - Intensify TLC - continue dietary change for decreasing TG as well as active lifestyle  - Continue red yeast rice  -  "Repeat lipid panel in 6mo    Blood Pressure Management:  ACC/AHA BP goal <130/80  Reasonable control at home; higher in office today.  Pt states this is \"normal\" for her in-office readings  Good control on previous abpm  No secondary cause identified  Did not tolerate Diovan due to fogginess and dizziness - tolerates low dose losartan  Had leg swelling on higher doses of Norvasc  Pt is reluctant to make any dose changes d/t SE's at higher doses  Plan:  - Continue atenolol 50 mg daily  - Continue amlodipine 2.5 mg daily  - Continue spironolactone hydrochlorothiazide one half pill daily  - Continue terazosin 1mg qhs  - Continue losartan 25 mg daily  - Encouraged more home BP monitoring and HR monitoring - call if consistently low or high  - Bring home BP monitor to next appt to verify readings  - Consider rpt abpm   - Follow lytes/GFR on next labs    Glycemic Status: Normal    Anti-Platelet/Anti-Coagulant Tx: yes  Patient states she cannot tolerate clopidogrel or aggrenox  - continue aspirin 325mg daily    Smoking: continue complete avoidance     Physical Activity: Continue training regimen with cross-fit    Weight Management and Nutrition: Dietary plan was discussed with patient at this visit including DASH diet, low carb/low sat fat and avoiding trans fats.    Other:     1. Carotid dissection with history of TIA and unilateral carotid occlusion: workup for vasculitis by rheumatology was negative and no evidence of fibromuscular dysplasia in renal arteries. Continue antiplatelet therapy, risk factor modification, and surveillance. Previous duplex shows stable occlusion and no significant stenosis on the right.  Needs surveillance d/t persistent symptoms of dizziness    2. TIA with occular symptoms- No recurrence.  Continue asa and RF mod    3. Venous angioma-Asymptomatic. Stable on CTA.  Consider repeat imaging in future, particularly if headaches return.  Otherwise defer to pcp    Instructed to follow-up with PCP " for remainder of adult medical needs: yes  We will partner with other providers in the management of established vascular disease and cardiometabolic risk factors.    Studies to Be Obtained: Carotid duplex Oct 2019- ordered today    Labs to Be Obtained: CMP    Follow up in: 4mo, after imaging    CLARENCE Person.     CC:  SOPHIE Burton MD

## 2019-08-28 DIAGNOSIS — I10 ESSENTIAL HYPERTENSION: ICD-10-CM

## 2019-08-29 RX ORDER — LOSARTAN POTASSIUM 25 MG/1
TABLET ORAL
Qty: 90 TAB | Refills: 3 | Status: SHIPPED | OUTPATIENT
Start: 2019-08-29 | End: 2020-08-04

## 2019-09-04 ENCOUNTER — TELEPHONE (OUTPATIENT)
Dept: VASCULAR LAB | Facility: MEDICAL CENTER | Age: 59
End: 2019-09-04

## 2019-09-04 NOTE — TELEPHONE ENCOUNTER
Prior authorization form for carotid ultrasound sent to [facility]. Form scanned to media.    Vasyl Huggins. [facility]  [facility]

## 2019-09-05 ENCOUNTER — TELEPHONE (OUTPATIENT)
Dept: VASCULAR LAB | Facility: MEDICAL CENTER | Age: 59
End: 2019-09-05

## 2019-09-09 ENCOUNTER — TELEPHONE (OUTPATIENT)
Dept: VASCULAR LAB | Facility: MEDICAL CENTER | Age: 59
End: 2019-09-09

## 2019-09-09 NOTE — TELEPHONE ENCOUNTER
Spoke with patient's plan who informed that the test does not meet medical necessity criteria according to their standards and Dr. Bloch would need to complete a [id number] with plan at [phone] before 9/11/19 to determine coverage of exam. (Routed to Dr. Bloch for review.)    Vasyl Huggins. [facility]

## 2019-09-16 DIAGNOSIS — I10 ESSENTIAL HYPERTENSION: ICD-10-CM

## 2019-09-16 RX ORDER — ATENOLOL 50 MG/1
50 TABLET ORAL DAILY
Qty: 10 TAB | Refills: 0 | Status: SHIPPED | OUTPATIENT
Start: 2019-09-16 | End: 2019-10-16 | Stop reason: SDUPTHER

## 2019-09-17 ENCOUNTER — TELEPHONE (OUTPATIENT)
Dept: VASCULAR LAB | Facility: MEDICAL CENTER | Age: 59
End: 2019-09-17

## 2019-09-17 NOTE — TELEPHONE ENCOUNTER
Spoke with peer to peer regarding carotid duplex authorization.  Study is authorized.  Has been scheduled.  Auth #[id number]    Michael Bloch, MD  [facility]

## 2019-10-11 DIAGNOSIS — I10 ESSENTIAL HYPERTENSION: Primary | ICD-10-CM

## 2019-10-14 ENCOUNTER — HOSPITAL ENCOUNTER (OUTPATIENT)
Dept: RADIOLOGY | Facility: MEDICAL CENTER | Age: 59
End: 2019-10-14
Attending: NURSE PRACTITIONER
Payer: COMMERCIAL

## 2019-10-14 DIAGNOSIS — I77.71 CAROTID ARTERY DISSECTION (HCC): ICD-10-CM

## 2019-10-14 DIAGNOSIS — I65.22 CAROTID OCCLUSION, LEFT: ICD-10-CM

## 2019-10-14 DIAGNOSIS — Z86.73 HX OF TIA (TRANSIENT ISCHEMIC ATTACK) AND STROKE: ICD-10-CM

## 2019-10-14 DIAGNOSIS — R42 DIZZINESS ON STANDING: ICD-10-CM

## 2019-10-14 DIAGNOSIS — I10 ESSENTIAL HYPERTENSION: ICD-10-CM

## 2019-10-14 PROCEDURE — 93880 EXTRACRANIAL BILAT STUDY: CPT | Mod: 26 | Performed by: INTERNAL MEDICINE

## 2019-10-14 PROCEDURE — 93880 EXTRACRANIAL BILAT STUDY: CPT

## 2019-10-15 ENCOUNTER — TELEPHONE (OUTPATIENT)
Dept: VASCULAR LAB | Facility: MEDICAL CENTER | Age: 59
End: 2019-10-15

## 2019-10-15 NOTE — TELEPHONE ENCOUNTER
Carotid duplex reviewed.  Will d/w patient at f/u  Anticipate 2 year f/u duplex.    Michael Bloch, MD  [facility]

## 2019-10-16 RX ORDER — ATENOLOL 50 MG/1
50 TABLET ORAL DAILY
Qty: 10 TAB | Refills: 0 | Status: SHIPPED | OUTPATIENT
Start: 2019-10-16 | End: 2019-10-18 | Stop reason: SDUPTHER

## 2019-10-18 DIAGNOSIS — I10 ESSENTIAL HYPERTENSION: ICD-10-CM

## 2019-10-18 RX ORDER — ATENOLOL 50 MG/1
50 TABLET ORAL DAILY
Qty: 90 TAB | Refills: 3 | Status: SHIPPED | OUTPATIENT
Start: 2019-10-18 | End: 2020-08-24

## 2019-10-28 ENCOUNTER — OFFICE VISIT (OUTPATIENT)
Dept: VASCULAR LAB | Facility: MEDICAL CENTER | Age: 59
End: 2019-10-28
Attending: INTERNAL MEDICINE
Payer: COMMERCIAL

## 2019-10-28 VITALS
WEIGHT: 169.7 LBS | DIASTOLIC BLOOD PRESSURE: 96 MMHG | HEART RATE: 78 BPM | SYSTOLIC BLOOD PRESSURE: 158 MMHG | HEIGHT: 67 IN | BODY MASS INDEX: 26.63 KG/M2

## 2019-10-28 DIAGNOSIS — E78.5 DYSLIPIDEMIA: ICD-10-CM

## 2019-10-28 DIAGNOSIS — I10 ESSENTIAL HYPERTENSION: ICD-10-CM

## 2019-10-28 DIAGNOSIS — I77.71 CAROTID ARTERY DISSECTION (HCC): ICD-10-CM

## 2019-10-28 PROCEDURE — 99214 OFFICE O/P EST MOD 30 MIN: CPT | Performed by: INTERNAL MEDICINE

## 2019-10-28 PROCEDURE — 99212 OFFICE O/P EST SF 10 MIN: CPT

## 2019-10-28 ASSESSMENT — ENCOUNTER SYMPTOMS
FALLS: 0
MYALGIAS: 0
SPEECH CHANGE: 0
PALPITATIONS: 1
SHORTNESS OF BREATH: 0
DEPRESSION: 0
HEADACHES: 0
NERVOUS/ANXIOUS: 0
COUGH: 0
WEIGHT LOSS: 0
FOCAL WEAKNESS: 0
LOSS OF CONSCIOUSNESS: 0
DIZZINESS: 1

## 2019-10-28 NOTE — PROGRESS NOTES
"VASCULAR FOLLOW UP VISIT  Subjective:   Juan Luis Rolon is a 59 y.o. female who presents today 10/28/19 for   Chief Complaint   Patient presents with   • Follow-Up     HPI:   Pt presents for f/u of carotid disease with dissection + unilateral occlusion, HTN, dyslipidemia, and previous TIA.   BPs at home 130s/70s - doesn't check at home very often - only about once per month  Has occasional palpitations  C/o dizziness upon standing; no syncope or near syncope, but does feel dizziness has increased over time  Continues to take red yeast rice; no myalgias  On plan sterol  No headaches  No chest pain, SOB, or LE swelling  Still on asa; no bleeding   Did fasting labwork last mo  Drives here from [place]  Follows with PCP    Social History     Tobacco Use   • Smoking status: Never Smoker   • Smokeless tobacco: Never Used   Substance Use Topics   • Alcohol use: Yes     Alcohol/week: 3.5 oz     Types: 7 Glasses of wine per week   • Drug use: No     DIET AND EXERCISE:  Weight Change: up a bit  Diet: common adult  Exercise: moderate regular exercise program- Cross Fit     Review of Systems   Constitutional: Negative for malaise/fatigue and weight loss.   Respiratory: Negative for cough and shortness of breath.    Cardiovascular: Positive for palpitations. Negative for chest pain and leg swelling.   Musculoskeletal: Negative for falls and myalgias.   Neurological: Positive for dizziness. Negative for speech change, focal weakness, loss of consciousness and headaches.   Psychiatric/Behavioral: Negative for depression. The patient is not nervous/anxious.       Objective:     Vitals:    10/28/19 1347 10/28/19 1350   BP: (!) 173/89 158/96   BP Location: Left arm Left arm   Patient Position: Sitting Sitting   BP Cuff Size: Small adult Small adult   Pulse: 80 78   Weight: 77 kg (169 lb 11.2 oz)    Height: 1.7 m (5' 6.93\")       Body mass index is 26.64 kg/m².  Physical Exam   Constitutional: She is oriented to person, place, and " time. No distress.   Cardiovascular: Normal rate, regular rhythm, normal heart sounds and intact distal pulses.   No murmur heard.  Pulmonary/Chest: Effort normal and breath sounds normal. No respiratory distress. She has no wheezes. She has no rales.   Musculoskeletal: She exhibits no edema or tenderness.   Neurological: She is alert and oriented to person, place, and time. No cranial nerve deficit. Coordination normal.   Skin: She is not diaphoretic.   Psychiatric: She has a normal mood and affect. Her behavior is normal.     DATA REVIEW    Labwork 6-24-14: LDL-C, 86, tsh 2.27, urine for ma 0.5, GFR > 60, HDL 73, trig 127    Labwork 6-23-15: LDL-C, 97, HDL-C, 79, Trig- 242, Non HDL-145, TSH-1.92, Na-128, K-4.2, Bun/Cr, 15/0.8, ALT 29, AST 41, Microalbumin-1.4, Urine Creatinine, 37.4          Carotid Duplex 12-5-2014  1. Chronic occlusion of the left internal carotid artery at its origin. Patent left vertebral artery.  2. Minimal heterogeneous plaque in the right carotid bulb and proximal right internal carotid artery. No right internal carotid artery rate limiting stenosis.    Carotid duplex 8-  CONCLUSIONS  1. Normal right carotid duplex examination.   2. Left internal carotid occlusion.   3. Normal bilateral subclavian and vertebral arterial exam.    CTA Oct 2015:   1. Left internal carotid artery occlusion  2. CTA [place] otherwise within normal limits  3. Probable left deep gray matter venous angioma, incidental  4. Underlying white matter changes are likely from previous small vessel ischemia/infarct    Blood work Oct 2015  LDL 91, nonHdl 143, gfr 74, na 134    CTA October 2016  Persistent occlusion left internal carotid artery  Inferior left basal ganglia and medial left temporal lobe venous angioma  Small vessel disease    Blood work September 2016-sodium 136, potassium 4.6, ALT 19, AST 26, total social to 24, temperature it's 237, HDL 70, , TSH 1.47, free T4 0.89    Carotid Duplex  10/16/17:   Occlusion of the left internal carotid artery.   No significant stenosis of the right carotid or bilateral subclavian or    bilateral vertebral arteries.    Blood work nov 2017 [facility]:  GFR 57, , nonHDL 133, tsh 1.65    Blood work 10/12/2018: NonHDL-139, TG-219, LDL-95, Gluc 93, GFR>90    Blood work 5/8/19:  GFR-80, cmp WNL, NonHDL-125, LDL-93,     Carotid duplex oct 2019:   Normal right carotid exam.    Occlusion of the left internal carotid.    Compared to the prior study of 10/3/18 - there has been no significant    change.           Medical Decision Making:  Today's Assessment / Status / Plan:     1. Essential hypertension     2. Carotid artery dissection (HCC)     3. Dyslipidemia       Patient Type: Secondary Prevention    Etiology of Established CVD if Present:   1) Carotid artery dissection  2) Presumed TIA, June 2015    Lipid Management: Qualifies for Statin Therapy Based on 2018 ACC/AHA Guidelines: yes  Calculated 10-Year Risk of ASCVD: N/A  Currently on Statin: No  Patient qualifies for statin therapy based on ACC/AHA guidelines given established ASCVD; however, it was non-atherosclerotic  Has not tolerated statins well in past, and refuses any additional medications for lipid management  Most recent lipid panel fairly decent -awaiting follow-up blood work from [facility] -[facility] has called for those results but they are not yet available  Plan.  -Continue to intensify TLC - continue dietary change for decreasing TG as well as active lifestyle  - Continue red yeast rice and plan sterol  -Review results of September blood work at follow-up visit    Blood Pressure Management:  ACC/AHA BP goal <130/80  Given the issues that she has had with higher dose of medication, would probably be happy with blood pressures in the low 130s over low 80s at home   previously with reasonable control at home; higher in office today.    Good control on previous abpm, but it was many years ago  No  secondary cause identified  Did not tolerate Diovan due to fogginess and dizziness - tolerates low dose losartan  Had leg swelling on higher doses of Norvasc  Pt is reluctant to make any dose changes d/t SE's at higher doses  Plan:  - Continue atenolol 50 mg daily  - Continue amlodipine 2.5 mg daily  - Continue spironolactone hydrochlorothiazide one half pill daily  - Continue terazosin 1mg qhs  - Continue losartan 25 mg daily  -Patient prefers starting at home blood pressure log to getting a repeat ABPM -will review at follow-up visit in 2 months    Glycemic Status: Normal  -Continue lifestyle modification    Anti-Platelet/Anti-Coagulant Tx: yes  Patient states she cannot tolerate clopidogrel or aggrenox  - continue aspirin 325mg daily    Smoking: continue complete avoidance     Physical Activity: Continue training regimen with cross-fit    Weight Management and Nutrition: Dietary plan was discussed with patient at this visit including DASH diet, low carb/low sat fat and avoiding trans fats.    Other:     1. Carotid dissection with history of TIA and unilateral carotid occlusion: workup for vasculitis by rheumatology was negative and no evidence of fibromuscular dysplasia in renal arteries. Continue antiplatelet therapy, risk factor modification, and surveillance. Previous duplex shows stable occlusion and no significant stenosis on the right.  Needs surveillance d/t persistent symptoms of dizziness    2. TIA with occular symptoms- No recurrence.  Continue asa and RF mod    3. Venous angioma-Asymptomatic. Stable on CTA.  Consider repeat imaging in future, particularly if headaches return.  Otherwise defer to pcp    Instructed to follow-up with PCP for remainder of adult medical needs: yes  We will partner with other providers in the management of established vascular disease and cardiometabolic risk factors.    Studies to Be Obtained: Carotid duplex Oct 2021  Labs to Be Obtained: No new blood work ordered, but will  need to follow-up on blood work recently done at [facility]    Follow up in: 2 months    Michael J Bloch, M.D.     CC:  SOPHIE Burton MD

## 2019-12-23 ENCOUNTER — OFFICE VISIT (OUTPATIENT)
Dept: VASCULAR LAB | Facility: MEDICAL CENTER | Age: 59
End: 2019-12-23
Attending: INTERNAL MEDICINE
Payer: COMMERCIAL

## 2019-12-23 VITALS
WEIGHT: 171 LBS | HEART RATE: 81 BPM | SYSTOLIC BLOOD PRESSURE: 134 MMHG | DIASTOLIC BLOOD PRESSURE: 82 MMHG | HEIGHT: 67 IN | BODY MASS INDEX: 26.84 KG/M2

## 2019-12-23 DIAGNOSIS — I77.71 CAROTID ARTERY DISSECTION (HCC): ICD-10-CM

## 2019-12-23 DIAGNOSIS — Z86.73 HX OF TIA (TRANSIENT ISCHEMIC ATTACK) AND STROKE: ICD-10-CM

## 2019-12-23 DIAGNOSIS — I10 ESSENTIAL HYPERTENSION: ICD-10-CM

## 2019-12-23 DIAGNOSIS — I65.22 CAROTID OCCLUSION, LEFT: ICD-10-CM

## 2019-12-23 DIAGNOSIS — E78.5 DYSLIPIDEMIA: ICD-10-CM

## 2019-12-23 PROCEDURE — 99212 OFFICE O/P EST SF 10 MIN: CPT | Performed by: NURSE PRACTITIONER

## 2019-12-23 PROCEDURE — 99214 OFFICE O/P EST MOD 30 MIN: CPT | Performed by: NURSE PRACTITIONER

## 2019-12-23 ASSESSMENT — ENCOUNTER SYMPTOMS
DIZZINESS: 1
BRUISES/BLEEDS EASILY: 1
COUGH: 0
NERVOUS/ANXIOUS: 0
MYALGIAS: 0
LOSS OF CONSCIOUSNESS: 0
HEADACHES: 0
WEIGHT LOSS: 0
DEPRESSION: 0
SPEECH CHANGE: 0
PALPITATIONS: 1
FALLS: 0
FOCAL WEAKNESS: 0
SHORTNESS OF BREATH: 0

## 2019-12-23 NOTE — PROGRESS NOTES
VASCULAR FOLLOW UP VISIT  Subjective:   Juan Luis Rolon is a 59 y.o. female who presents today 12/23/19 for   No chief complaint on file.    HPI:   Pt presents for f/u of carotid disease with dissection + unilateral occlusion, HTN, dyslipidemia, and previous TIA.   BPs at home 108-160/78-95 but pt's cuff running 20 greater than office cuff  Has occasional palpitations  C/o dizziness upon standing; no syncope or near syncope, but does feel dizziness has increased over time  Continues to take red yeast rice; no myalgias  On plan sterol  No headaches  No chest pain, SOB, or LE swelling  Still on asa; no bleeding, some bruising  Drives here from [place] stressed during the drive yet her BP is still good  Follows with PCP    Social History     Tobacco Use   • Smoking status: Never Smoker   • Smokeless tobacco: Never Used   Substance Use Topics   • Alcohol use: Yes     Alcohol/week: 3.5 oz     Types: 7 Glasses of wine per week   • Drug use: No     DIET AND EXERCISE:  Weight Change: up a bit  Diet: common adult  Exercise: moderate regular exercise program- Cross Fit     Review of Systems   Constitutional: Negative for malaise/fatigue and weight loss.   Respiratory: Negative for cough and shortness of breath.    Cardiovascular: Positive for palpitations. Negative for chest pain and leg swelling.   Musculoskeletal: Negative for falls and myalgias.   Neurological: Positive for dizziness. Negative for speech change, focal weakness, loss of consciousness and headaches.   Endo/Heme/Allergies: Bruises/bleeds easily.   Psychiatric/Behavioral: Negative for depression. The patient is not nervous/anxious.       Objective:     There were no vitals filed for this visit.   There is no height or weight on file to calculate BMI.  Physical Exam   Constitutional: She is oriented to person, place, and time. No distress.   Cardiovascular: Normal rate, regular rhythm, normal heart sounds and intact distal pulses.   No murmur  heard.  Pulmonary/Chest: Effort normal and breath sounds normal. No respiratory distress. She has no wheezes. She has no rales.   Musculoskeletal:         General: No tenderness or edema.   Neurological: She is alert and oriented to person, place, and time. No cranial nerve deficit. Coordination normal.   Skin: She is not diaphoretic.   Psychiatric: She has a normal mood and affect. Her behavior is normal.     DATA REVIEW    Labwork 6-24-14: LDL-C, 86, tsh 2.27, urine for ma 0.5, GFR > 60, HDL 73, trig 127    Labwork 6-23-15: LDL-C, 97, HDL-C, 79, Trig- 242, Non HDL-145, TSH-1.92, Na-128, K-4.2, Bun/Cr, 15/0.8, ALT 29, AST 41, Microalbumin-1.4, Urine Creatinine, 37.4          Carotid Duplex 12-5-2014  1. Chronic occlusion of the left internal carotid artery at its origin. Patent left vertebral artery.  2. Minimal heterogeneous plaque in the right carotid bulb and proximal right internal carotid artery. No right internal carotid artery rate limiting stenosis.    Carotid duplex 8-  CONCLUSIONS  1. Normal right carotid duplex examination.   2. Left internal carotid occlusion.   3. Normal bilateral subclavian and vertebral arterial exam.    CTA Oct 2015:   1. Left internal carotid artery occlusion  2. CTA [place] otherwise within normal limits  3. Probable left deep gray matter venous angioma, incidental  4. Underlying white matter changes are likely from previous small vessel ischemia/infarct    Blood work Oct 2015  LDL 91, nonHdl 143, gfr 74, na 134    CTA October 2016  Persistent occlusion left internal carotid artery  Inferior left basal ganglia and medial left temporal lobe venous angioma  Small vessel disease    Blood work September 2016-sodium 136, potassium 4.6, ALT 19, AST 26, total social to 24, temperature it's 237, HDL 70, , TSH 1.47, free T4 0.89    Carotid Duplex 10/16/17:   Occlusion of the left internal carotid artery.   No significant stenosis of the right carotid or bilateral  subclavian or    bilateral vertebral arteries.    [facility] [place]:  GFR 57, , nonHDL 133, tsh 1.65    Blood work 10/12/2018: NonHDL-139, TG-219, LDL-95, Gluc 93, GFR>90    Blood work 5/8/19:  GFR-80, cmp WNL, NonHDL-125, LDL-93,     Carotid duplex oct 2019:   Normal right carotid exam.    Occlusion of the left internal carotid.    Compared to the prior study of 10/3/18 - there has been no significant    change    Blood Work 10/9/19:  Na -133, K+ 4.5, cret-0.76, bs-114, ALT 25, AST32, GFR >90  No lipid's done           Medical Decision Making:  Today's Assessment / Status / Plan:     1. Carotid artery dissection (HCC)     2. Carotid occlusion, left     3. Dyslipidemia     4. Essential hypertension     5. Hx of TIA (transient ischemic attack) and stroke       Patient Type: Secondary Prevention    Etiology of Established CVD if Present:   1) Carotid artery dissection  2) Presumed TIA, June 2015    Lipid Management: Qualifies for Statin Therapy Based on 2018 ACC/AHA Guidelines: yes  Calculated 10-Year Risk of ASCVD: N/A  Currently on Statin: No  Patient qualifies for statin therapy based on ACC/AHA guidelines given established ASCVD; however, it was non-atherosclerotic  Has not tolerated statins well in past, and refuses any additional medications for lipid management  Most recent lipid panel fairly decent -awaiting [facility] [place] -No Lip[id panel done om last labs.   Plan.  -Continue to intensify TLC - continue dietary change for decreasing TG as well as active lifestyle  - Continue red yeast rice and plan sterol  - Lipid level prior to next visit      Blood Pressure Management:  ACC/AHA BP goal <130/80  Given the issues that she has had with higher dose of medication, would probably be happy with blood pressures in the low 130s over low 80s at home   previously with reasonable control at home; higher in office today.    Good control on previous abpm, but it was  many years ago  No secondary cause identified  Did not tolerate Diovan due to fogginess and dizziness - tolerates low dose losartan  Had leg swelling on higher doses of Norvasc  Pt is reluctant to make any dose changes d/t SE's at higher doses  Plan:  - Continue atenolol 50 mg daily  - Continue amlodipine 2.5 mg daily  - Continue spironolactone hydrochlorothiazide one half pill daily  - Continue terazosin 1mg qhs  - Continue losartan 25 mg daily  - Patient prefers starting at home blood pressure log to getting a repeat ABPM -Pt will see if she can get her monitor calibrated- will review at follow-up visit in 3 months    Glycemic Status: Normal  -Continue lifestyle modification    Anti-Platelet/Anti-Coagulant Tx: yes  Patient states she cannot tolerate clopidogrel or aggrenox  - continue aspirin 325mg daily    Smoking: continue complete avoidance     Physical Activity: Continue training regimen with cross-fit    Weight Management and Nutrition: Dietary plan was discussed with patient at this visit including DASH diet, low carb/low sat fat and avoiding trans fats.    Other:     1. Carotid dissection with history of TIA and unilateral carotid occlusion: workup for vasculitis by rheumatology was negative and no evidence of fibromuscular dysplasia in renal arteries. Continue antiplatelet therapy, risk factor modification, and surveillance. Previous duplex shows stable occlusion and no significant stenosis on the right.  Needs surveillance d/t persistent symptoms of dizziness. Needs prior auth.     2. TIA with occular symptoms- No recurrence.  Continue asa and RF mod    3. Venous angioma-Asymptomatic. Stable on CTA.  Consider repeat imaging in future, particularly if headaches return.  Otherwise defer to pcp    Instructed to follow-up with PCP for remainder of adult medical needs: yes  We will partner with other providers in the management of established vascular disease and cardiometabolic risk factors.    Studies to Be  Obtained: Carotid duplex Oct 2021  Labs to Be Obtained: CMP and lipids at [facility]    Follow up in: 3 months    ARI Oleary     CC:  SOPHIE Burton MD

## 2020-01-02 ENCOUNTER — TELEPHONE (OUTPATIENT)
Dept: VASCULAR LAB | Facility: MEDICAL CENTER | Age: 60
End: 2020-01-02

## 2020-01-02 DIAGNOSIS — E78.5 DYSLIPIDEMIA: ICD-10-CM

## 2020-01-02 NOTE — TELEPHONE ENCOUNTER
Spoke with the pt regarding the lab results. Her trig are up to 310. Discussed options with the pt. She does not want any additional medications at this point and would like to decrease the surgery foods in the diet. I will send her the Management for high triglycerides and also a new lab slip for lipid draw prior to her next appt. CLARENCE Oleary.

## 2020-02-14 DIAGNOSIS — I10 ESSENTIAL HYPERTENSION: ICD-10-CM

## 2020-02-18 RX ORDER — TERAZOSIN 1 MG/1
CAPSULE ORAL
Qty: 90 CAP | Refills: 1 | Status: SHIPPED | OUTPATIENT
Start: 2020-02-18 | End: 2020-08-04

## 2020-02-18 NOTE — TELEPHONE ENCOUNTER
Refill X 6 months, sent to pharmacy.Pt. Seen in the last 6 months per protocol.   No results found for: SODIUM, POTASSIUM, CHLORIDE, CO2, GLUCOSE, BUN, CREATININE, BUNCREATRAT, GLOMRATE      Hong Cummings, PharmD, BCACP

## 2020-02-26 DIAGNOSIS — I10 ESSENTIAL HYPERTENSION: ICD-10-CM

## 2020-02-27 RX ORDER — SPIRONOLACTONE AND HYDROCHLOROTHIAZIDE 25; 25 MG/1; MG/1
TABLET ORAL
Qty: 45 TAB | Refills: 3 | Status: SHIPPED | OUTPATIENT
Start: 2020-02-27 | End: 2021-01-25

## 2020-03-30 ENCOUNTER — OFFICE VISIT (OUTPATIENT)
Dept: VASCULAR LAB | Facility: MEDICAL CENTER | Age: 60
End: 2020-03-30
Payer: COMMERCIAL

## 2020-03-30 DIAGNOSIS — I77.71 CAROTID ARTERY DISSECTION (HCC): ICD-10-CM

## 2020-03-30 DIAGNOSIS — E78.5 DYSLIPIDEMIA: ICD-10-CM

## 2020-03-30 DIAGNOSIS — I10 ESSENTIAL HYPERTENSION: ICD-10-CM

## 2020-03-30 PROCEDURE — 99442 PR PHYSICIAN TELEPHONE EVALUATION 11-20 MIN: CPT | Mod: CR | Performed by: NURSE PRACTITIONER

## 2020-03-30 ASSESSMENT — ENCOUNTER SYMPTOMS
FALLS: 0
SPEECH CHANGE: 0
HEADACHES: 0
BLOOD IN STOOL: 0
COUGH: 0
PALPITATIONS: 1
MYALGIAS: 0
WEIGHT LOSS: 0
DIZZINESS: 1
DEPRESSION: 0
FOCAL WEAKNESS: 0
FLANK PAIN: 0
SHORTNESS OF BREATH: 0
BRUISES/BLEEDS EASILY: 1
LOSS OF CONSCIOUSNESS: 0
NERVOUS/ANXIOUS: 0

## 2020-03-30 NOTE — PROGRESS NOTES
Telephone Appointment Visit   As a means of avoiding spread of COVID-19, this visit is being conducted by telephone. This telephone visit was initiated by the patient and they verbally consented.    Time at start of call: 10:55    Reason for Call:  Medication Follow-up    Visit completed via Facetime due to restrictions of COVID-19 pandemic.  All issues as below were discussed and addressed by no physical exam was performed unless allowed by visual confirmation on Facetime.  If it was felt that patient should be evalauted in the clinic, there were directed there.  Patient verbally consented to Facetime tele-video visit.       There are no diagnoses linked to this encounter.  VASCULAR FOLLOW UP VISIT  Subjective:   Juan Luis Rolon is a 60 y.o. female who presents today 03/30/20 with phone visit    No chief complaint on file.    HPI:   Pt presents for f/u of carotid disease with dissection + unilateral occlusion, HTN, dyslipidemia, and previous TIA.   BPs at home 120-130/70-80 but pt's cuff running 20 greater than office cuff  Had a TIA in Jan lasting only about 2 min, did not go into the ER  Has occasional palpitations  C/o dizziness upon standing; no syncope or near syncope  Continues to take red yeast rice; no myalgias  On plan sterol  No headaches  No chest pain, SOB, or LE swelling  Still on asa; no bleeding, some bruising  Follows with PCP  Stopped drinking wine     Social History     Tobacco Use   • Smoking status: Never Smoker   • Smokeless tobacco: Never Used   Substance Use Topics   • Alcohol use: Yes     Alcohol/week: 3.5 oz     Types: 7 Glasses of wine per week   • Drug use: No     DIET AND EXERCISE:  Weight Change: down with stated weight of 164  Diet: common adult  Exercise: moderate regular exercise program- Cross Fit     Review of Systems   Constitutional: Negative for malaise/fatigue and weight loss.   HENT: Negative for nosebleeds.    Respiratory: Negative for cough and shortness of breath.     Cardiovascular: Positive for palpitations. Negative for chest pain and leg swelling.   Gastrointestinal: Negative for blood in stool and melena.   Genitourinary: Negative for flank pain and hematuria.   Musculoskeletal: Negative for falls and myalgias.   Neurological: Positive for dizziness. Negative for speech change, focal weakness, loss of consciousness and headaches.   Endo/Heme/Allergies: Bruises/bleeds easily.   Psychiatric/Behavioral: Negative for depression. The patient is not nervous/anxious.       Objective:     DATA REVIEW    Labwork 6-24-14: LDL-C, 86, tsh 2.27, urine for ma 0.5, GFR > 60, HDL 73, trig 127    Labwork 6-23-15: LDL-C, 97, HDL-C, 79, Trig- 242, Non HDL-145, TSH-1.92, Na-128, K-4.2, Bun/Cr, 15/0.8, ALT 29, AST 41, Microalbumin-1.4, Urine Creatinine, 37.4          Carotid Duplex 12-5-2014  1. Chronic occlusion of the left internal carotid artery at its origin. Patent left vertebral artery.  2. Minimal heterogeneous plaque in the right carotid bulb and proximal right internal carotid artery. No right internal carotid artery rate limiting stenosis.    Carotid duplex 8-  CONCLUSIONS  1. Normal right carotid duplex examination.   2. Left internal carotid occlusion.   3. Normal bilateral subclavian and vertebral arterial exam.    CTA Oct 2015:   1. Left internal carotid artery occlusion  2. CTA [place] otherwise within normal limits  3. Probable left deep gray matter venous angioma, incidental  4. Underlying white matter changes are likely from previous small vessel ischemia/infarct    Blood work Oct 2015  LDL 91, nonHdl 143, gfr 74, na 134    CTA October 2016  Persistent occlusion left internal carotid artery  Inferior left basal ganglia and medial left temporal lobe venous angioma  Small vessel disease    Blood work September 2016-sodium 136, potassium 4.6, ALT 19, AST 26, total social to 24, temperature it's 237, HDL 70, , TSH 1.47, free T4 0.89    Carotid Duplex  10/16/17:   Occlusion of the left internal carotid artery.   No significant stenosis of the right carotid or bilateral subclavian or    bilateral vertebral arteries.    Blood work nov 2017 [facility]:  GFR 57, , nonHDL 133, tsh 1.65    Blood work 10/12/2018: NonHDL-139, TG-219, LDL-95, Gluc 93, GFR>90    Blood work 5/8/19:  GFR-80, cmp WNL, NonHDL-125, LDL-93,     Carotid duplex oct 2019:   Normal right carotid exam.    Occlusion of the left internal carotid.    Compared to the prior study of 10/3/18 - there has been no significant    change    Blood Work 10/9/19:  Na -133, K+ 4.5, cret-0.76, bs-114, ALT 25, AST32, GFR >90  No lipid's done   Lab work 3/3/20:  Chol-T 192, trig-98, HDL-62 [id number], Non-HDL-130          Medical Decision Making:  Today's Assessment / Status / Plan:     No diagnosis found.  Patient Type: Secondary Prevention    Etiology of Established CVD if Present:   1) Carotid artery dissection  2) Presumed TIA, June 2015    Lipid Management: Qualifies for Statin Therapy Based on 2018 ACC/AHA Guidelines: yes  Calculated 10-Year Risk of ASCVD: N/A  Currently on Statin: No  Patient qualifies for statin therapy based on ACC/AHA guidelines given established ASCVD; however, it was non-atherosclerotic  Has not tolerated statins well in past, and refuses any additional medications for lipid management  Most recent lipid panel fairly decent -awaiting follow-up blood work from [facility]. Last blood work trig down from 310 to 98 pt stopped drinking any wine.    Plan.  -Continue to intensify TLC - continue dietary change for decreasing TG as well as active lifestyle  - Continue red yeast rice and plan sterol  - Lipid level prior to next visit      Blood Pressure Management:  ACC/AHA BP goal <130/80  Given the issues that she has had with higher dose of medication, would probably be happy with blood pressures in the low 130s over low 80s at home   previously with reasonable control at home; higher  in office today.    Good control on previous abpm, but it was many years ago  No secondary cause identified  Did not tolerate Diovan due to fogginess and dizziness - tolerates low dose losartan  Had leg swelling on higher doses of Norvasc  Pt is reluctant to make any dose changes d/t SE's at higher doses  Plan:  - Continue atenolol 50 mg daily  - Continue amlodipine 2.5 mg daily  - Continue spironolactone hydrochlorothiazide one half pill daily  - Continue terazosin 1mg qhs  - Continue losartan 25 mg daily  - Patient prefers starting at home blood pressure log to getting a repeat ABPM -Pt will see if she can get her monitor calibrated- will review at follow-up visit in 3 months    Glycemic Status: Normal  -Continue lifestyle modification    Anti-Platelet/Anti-Coagulant Tx: yes  Patient states she cannot tolerate clopidogrel or aggrenox  - continue aspirin 325mg daily    Smoking: continue complete avoidance     Physical Activity: Continue training regimen with cross-fit    Weight Management and Nutrition: Dietary plan was discussed with patient at this visit including DASH diet, low carb/low sat fat and avoiding trans fats.    Other:     1. Carotid dissection with history of TIA and unilateral carotid occlusion: workup for vasculitis by rheumatology was negative and no evidence of fibromuscular dysplasia in renal arteries. Continue antiplatelet therapy, risk factor modification, and surveillance. Previous duplex shows stable occlusion and no significant stenosis on the right.  Needs surveillance d/t persistent symptoms of dizziness. Needs prior auth.     2. TIA with occular symptoms- No recurrence.  Continue asa and RF mod    3. Venous angioma-Asymptomatic. Stable on CTA.  Consider repeat imaging in future, particularly if headaches return.  Otherwise defer to pcp    Instructed to follow-up with PCP for remainder of adult medical needs: yes  We will partner with other providers in the management of established  vascular disease and cardiometabolic risk factors.    Studies to Be Obtained: Carotid duplex Oct 2021  Labs to Be Obtained: CMP and lipids    Follow up in: 3 months    ARI Oleary     CC:  SOPHIE Burton MD      Time at end of call: 11:15  Total Time Spent: 11-20 minutes    ARI Oleary

## 2020-06-15 ENCOUNTER — TELEPHONE (OUTPATIENT)
Dept: VASCULAR LAB | Facility: MEDICAL CENTER | Age: 60
End: 2020-06-15

## 2020-07-15 ENCOUNTER — TELEPHONE (OUTPATIENT)
Dept: VASCULAR LAB | Facility: MEDICAL CENTER | Age: 60
End: 2020-07-15

## 2020-07-15 ENCOUNTER — OFFICE VISIT (OUTPATIENT)
Dept: VASCULAR LAB | Facility: MEDICAL CENTER | Age: 60
End: 2020-07-15
Payer: COMMERCIAL

## 2020-07-15 DIAGNOSIS — I77.71 CAROTID ARTERY DISSECTION (HCC): ICD-10-CM

## 2020-07-15 DIAGNOSIS — E78.5 DYSLIPIDEMIA: ICD-10-CM

## 2020-07-15 DIAGNOSIS — Z00.00 PREVENTATIVE HEALTH CARE: ICD-10-CM

## 2020-07-15 DIAGNOSIS — I10 ESSENTIAL HYPERTENSION: ICD-10-CM

## 2020-07-15 PROCEDURE — 99213 OFFICE O/P EST LOW 20 MIN: CPT | Mod: 95,CR | Performed by: NURSE PRACTITIONER

## 2020-07-15 ASSESSMENT — ENCOUNTER SYMPTOMS
FALLS: 0
HEADACHES: 0
BLURRED VISION: 0
PALPITATIONS: 1
DIZZINESS: 1
COUGH: 0
WEIGHT LOSS: 0
LOSS OF CONSCIOUSNESS: 0
SPEECH CHANGE: 0
DOUBLE VISION: 0
BLOOD IN STOOL: 0
SHORTNESS OF BREATH: 0
MYALGIAS: 0
WHEEZING: 0
NERVOUS/ANXIOUS: 0
BRUISES/BLEEDS EASILY: 1
FOCAL WEAKNESS: 0

## 2020-07-15 NOTE — PROGRESS NOTES
Visit completed via [employer] due to restrictions of COVID-19 pandemic.  All issues as below were discussed and addressed by no physical exam was performed unless allowed by visual confirmation on Facetime.  If it was felt that patient should be evaluated in the clinic, there were directed there.  Patient verbally consented to Facetime tele-video visit.       .VASCULAR FOLLOW UP VISIT  Subjective:   Juan Luis Rolon is a 60 y.o. female who presents today 7/15/2020 for   No chief complaint on file.      HPI:   Pt presents for f/u of carotid disease with dissection + unilateral occlusion, HTN, dyslipidemia, and previous TIA.   BPs at home  117-120/72 highest 130/82   but pt's cuff running 20 greater than office cuff  Had a TIA in June 27 weakness of Lt arm and leg  lasting only about 2 min,  with floater in vision did not go into the ER  Has occasional palpitations  C/o dizziness upon standing; no syncope or near syncope  Continues to take red yeast rice; no myalgias  Refusing any other lipid medications at this time   On plan sterol  No headaches  No chest pain, SOB, or LE swelling  Still on asa; no bleeding, some bruising  Follows with PCP  Stopped drinking wine       Social History     Tobacco Use   • Smoking status: Never Smoker   • Smokeless tobacco: Never Used   Substance Use Topics   • Alcohol use: Yes     Alcohol/week: 3.5 oz     Types: 7 Glasses of wine per week   • Drug use: No     DIET AND EXERCISE:  Weight Change: up a bit  Diet: common adult  Exercise: moderate regular exercise program- Cross Fit     Review of Systems   Constitutional: Negative for malaise/fatigue and weight loss.   HENT: Negative for nosebleeds.    Eyes: Negative for blurred vision and double vision.   Respiratory: Negative for cough, shortness of breath and wheezing.    Cardiovascular: Positive for palpitations (rare). Negative for chest pain and leg swelling.   Gastrointestinal: Negative for blood in stool.   Genitourinary: Negative for  hematuria.   Musculoskeletal: Negative for falls and myalgias.   Skin: Negative for rash.   Neurological: Positive for dizziness (with rising quickly). Negative for speech change, focal weakness, loss of consciousness and headaches.   Endo/Heme/Allergies: Bruises/bleeds easily.   Psychiatric/Behavioral: The patient is not nervous/anxious.       Objective:     There were no vitals filed for this visit.   There is no height or weight on file to calculate BMI.  Physical Exam   Constitutional: She is oriented to person, place, and time. She appears well-developed and well-nourished.   Musculoskeletal: Normal range of motion.   Neurological: She is alert and oriented to person, place, and time.   Skin: No pallor.   Psychiatric: She has a normal mood and affect. Her behavior is normal.     DATA REVIEW    Labwork 6-24-14: LDL-C, 86, tsh 2.27, urine for ma 0.5, GFR > 60, HDL 73, trig 127    Labwork 6-23-15: LDL-C, 97, HDL-C, 79, Trig- 242, Non HDL-145, TSH-1.92, Na-128, K-4.2, Bun/Cr, 15/0.8, ALT 29, AST 41, Microalbumin-1.4, Urine Creatinine, 37.4          Carotid Duplex 12-5-2014  1. Chronic occlusion of the left internal carotid artery at its origin. Patent left vertebral artery.  2. Minimal heterogeneous plaque in the right carotid bulb and proximal right internal carotid artery. No right internal carotid artery rate limiting stenosis.    Carotid duplex 8-  CONCLUSIONS  1. Normal right carotid duplex examination.   2. Left internal carotid occlusion.   3. Normal bilateral subclavian and vertebral arterial exam.    CTA Oct 2015:   1. Left internal carotid artery occlusion  2. CTA [place] otherwise within normal limits  3. Probable left deep gray matter venous angioma, incidental  4. Underlying white matter changes are likely from previous small vessel ischemia/infarct    Blood work Oct 2015  LDL 91, nonHdl 143, gfr 74, na 134    CTA October 2016  Persistent occlusion left internal carotid artery  Inferior  left basal ganglia and medial left temporal lobe venous angioma  Small vessel disease    Blood work September 2016-sodium 136, potassium 4.6, ALT 19, AST 26, total social to 24, temperature it's 237, HDL 70, , TSH 1.47, free T4 0.89    Carotid Duplex 10/16/17:   Occlusion of the left internal carotid artery.   No significant stenosis of the right carotid or bilateral subclavian or    bilateral vertebral arteries.    Blood work nov 2017 [facility]:  GFR 57, , nonHDL 133, tsh 1.65    Blood work 10/12/2018: NonHDL-139, TG-219, LDL-95, Gluc 93, GFR>90    Blood work 5/8/19:  GFR-80, cmp WNL, NonHDL-125, LDL-93,     Carotid duplex oct 2019:   Normal right carotid exam.    Occlusion of the left internal carotid.    Compared to the prior study of 10/3/18 - there has been no significant    change     Blood Work 10/9/19:  Na -133, K+ 4.5, cret-0.76, bs-114, ALT 25, AST32, GFR >90  No lipid's done   Lab work 3/3/20:  Chol-T 192, trig-98, HDL-62 [id number], Non-HDL-130    Blood work 7/13/0202  Na 138, K+ 4.1 cret 0.76, , ast 21, alt 21, GFR 84, CHO-Y 223, Trig 197, HDL 67,  Non           Medical Decision Making:  Today's Assessment / Status / Plan:     No diagnosis found.  Patient Type: Secondary Prevention    Etiology of Established CVD if Present:   1) Carotid artery dissection  2) Presumed TIA, June 2015    Lipid Management: Qualifies for Statin Therapy Based on 2013 ACC/AHA Guidelines: yes  Calculated 10-Year Risk of ASCVD: N/A  Currently on Statin: No    Patient qualifies for statin therapy based on ACC/AHA guidelines given established ASCVD; however, it was non-atherosclerotic  Has not tolerated statins well in past, and refuses any additional medications for lipid management  Most recent lipid panel slightly worse than prior lab  Plan.  -Continue to intensify TLC - continue dietary change for decreasing TG as well as active lifestyle  - Continue red yeast rice and plan sterol  -  "Lipid level prior to next visit  -Consider Nexletol-pt will do research on the med and discuss with next visit    Blood Pressure Management:  ACC/AHA BP goal <130/80  Reasonable control at home; higher in office today.  Pt states this is \"normal\" for her in-office readings  Good control on previous abpm  No secondary cause identified  Did not tolerate Diovan due to fogginess and dizziness - tolerates low dose losartan  Had leg swelling on higher doses of Norvasc  Pt is reluctant to make any dose changes d/t SE's at higher doses  Plan:  - Continue atenolol 50 mg daily  - Continue amlodipine 2.5 mg daily  - Continue spironolactone/ hydrochlorothiazide one half pill daily  - Continue terazosin 1mg qhs  - Continue losartan 25 mg daily  - Patient prefers starting at home blood pressure log to getting a repeat ABPM -Pt will see if she can get her monitor calibrated- will review at follow-up visit in 3 months    Glycemic Status: Normal    Anti-Platelet/Anti-Coagulant Tx: yes  Patient states she cannot tolerate clopidogrel or aggrenox  - continue aspirin 325mg daily    Smoking: contiSmoking: continue complete avoidance      Physical Activity: Continue training regimen with cross-fit     Weight Management and Nutrition: Dietary plan was discussed with patient at this visit including DASH diet, low carb/low sat fat and avoiding trans fats.     Other:      1. Carotid dissection with history of TIA and unilateral carotid occlusion: workup for vasculitis by rheumatology was negative and no evidence of fibromuscular dysplasia in renal arteries. Continue antiplatelet therapy, risk factor modification, and surveillance. Previous duplex shows stable occlusion and no significant stenosis on the right.  Needs surveillance d/t persistent symptoms of dizziness. Needs prior auth.      2. TIA with occular symptoms- Continues to have reurrence.  Continue asa and RF mod. Discussed 911 for future TIA's and ER visits.       3. Venous " angioma-Asymptomatic. Stable on CTA.  Consider repeat imaging in future, particularly if headaches return.  Otherwise defer to pcp    4. Need for colonoscopy- Referral to GI consultant for colonoscopy and fu care      Instructed to follow-up with PCP for remainder of adult medical needs: yes  We will partner with other providers in the management of established vascular disease and cardiometabolic risk factors.     Studies to Be Obtained: Carotid duplex Oct 2021  Labs to Be Obtained: CMP and lipids     Follow up in: 3 months     ARI Oleary      CC:  SOPHIE Burton MD

## 2020-07-15 NOTE — TELEPHONE ENCOUNTER
Mailed to address on file     ----- Message from ARI Oleary sent at 7/15/2020  2:16 PM PDT -----  Regarding: avs and labs  Please print and send via mail to the pt's home address. Thanks ARI Oleary

## 2020-08-03 DIAGNOSIS — I10 ESSENTIAL HYPERTENSION: ICD-10-CM

## 2020-08-04 RX ORDER — TERAZOSIN 1 MG/1
CAPSULE ORAL
Qty: 90 CAP | Refills: 3 | Status: SHIPPED | OUTPATIENT
Start: 2020-08-04 | End: 2021-06-25

## 2020-08-04 RX ORDER — LOSARTAN POTASSIUM 25 MG/1
TABLET ORAL
Qty: 90 TAB | Refills: 3 | Status: SHIPPED | OUTPATIENT
Start: 2020-08-04 | End: 2021-06-25

## 2020-08-22 DIAGNOSIS — I10 ESSENTIAL HYPERTENSION: ICD-10-CM

## 2020-08-24 RX ORDER — ATENOLOL 50 MG/1
TABLET ORAL
Qty: 90 TAB | Refills: 3 | Status: SHIPPED | OUTPATIENT
Start: 2020-08-24 | End: 2021-07-14

## 2020-10-21 ENCOUNTER — OFFICE VISIT (OUTPATIENT)
Dept: VASCULAR LAB | Facility: MEDICAL CENTER | Age: 60
End: 2020-10-21
Payer: COMMERCIAL

## 2020-10-21 DIAGNOSIS — E78.5 DYSLIPIDEMIA: ICD-10-CM

## 2020-10-21 DIAGNOSIS — I65.22 CAROTID OCCLUSION, LEFT: ICD-10-CM

## 2020-10-21 DIAGNOSIS — I10 ESSENTIAL HYPERTENSION: ICD-10-CM

## 2020-10-21 DIAGNOSIS — Z86.73 HX OF TIA (TRANSIENT ISCHEMIC ATTACK) AND STROKE: ICD-10-CM

## 2020-10-21 DIAGNOSIS — I77.71 CAROTID ARTERY DISSECTION (HCC): ICD-10-CM

## 2020-10-21 PROCEDURE — 99213 OFFICE O/P EST LOW 20 MIN: CPT | Mod: 95,CR | Performed by: NURSE PRACTITIONER

## 2020-10-21 ASSESSMENT — ENCOUNTER SYMPTOMS
WHEEZING: 0
WEIGHT LOSS: 0
COUGH: 0
BRUISES/BLEEDS EASILY: 1
LOSS OF CONSCIOUSNESS: 0
FALLS: 0
FOCAL WEAKNESS: 0
DIZZINESS: 1
PALPITATIONS: 1
BLURRED VISION: 0
SPEECH CHANGE: 0
HEADACHES: 0
NERVOUS/ANXIOUS: 0
BLOOD IN STOOL: 0
SHORTNESS OF BREATH: 0
MYALGIAS: 0
DOUBLE VISION: 0

## 2020-10-21 NOTE — PROGRESS NOTES
Visit completed via [employer] due to restrictions of COVID-19 pandemic.  All issues as below were discussed and addressed by no physical exam was performed unless allowed by visual confirmation on Facetime.  If it was felt that patient should be evaluated in the clinic, there were directed there.  Patient verbally consented to Facetime tele-video visit.       .VASCULAR FOLLOW UP VISIT  Subjective:   Juan Luis Rolon is a 60 y.o. female who presents today 10/21/2020 for     HPI:   Pt presents for f/u of carotid disease with dissection + unilateral occlusion, HTN, dyslipidemia, and previous TIA.   BPs at home 120/70   Denies CP, SOB, occasional dizziness with standing  Occasional palpitations at night  Taking plant sterols and red yeast rice  Paying close attention to diet-- particularly carbs  On ASA- no bleeding issues  No TIA or stroke symptoms  Continues exercise routine  Stopped drinking wine     Social History     Tobacco Use   • Smoking status: Never Smoker   • Smokeless tobacco: Never Used   Substance Use Topics   • Alcohol use: Yes     Alcohol/week: 3.5 oz     Types: 7 Glasses of wine per week   • Drug use: No     DIET AND EXERCISE:  Weight Change: up a bit  Diet: common adult  Exercise: moderate regular exercise program- Cross Fit     Review of Systems   Constitutional: Negative for malaise/fatigue and weight loss.   HENT: Negative for nosebleeds.    Eyes: Negative for blurred vision and double vision.   Respiratory: Negative for cough, shortness of breath and wheezing.    Cardiovascular: Positive for palpitations (rare). Negative for chest pain and leg swelling.   Gastrointestinal: Negative for blood in stool.   Genitourinary: Negative for hematuria.   Musculoskeletal: Negative for falls and myalgias.   Skin: Negative for rash.   Neurological: Positive for dizziness (with rising quickly). Negative for speech change, focal weakness, loss of consciousness and headaches.   Endo/Heme/Allergies: Bruises/bleeds  easily.   Psychiatric/Behavioral: The patient is not nervous/anxious.       Objective:     There were no vitals filed for this visit.   There is no height or weight on file to calculate BMI.  Physical Exam   Constitutional: She is oriented to person, place, and time. She appears well-developed and well-nourished.   Musculoskeletal: Normal range of motion.   Neurological: She is alert and oriented to person, place, and time.   Skin: No pallor.   Psychiatric: She has a normal mood and affect. Her behavior is normal.     DATA REVIEW    Labwork 6-24-14: LDL-C, 86, tsh 2.27, urine for ma 0.5, GFR > 60, HDL 73, trig 127    Labwork 6-23-15: LDL-C, 97, HDL-C, 79, Trig- 242, Non HDL-145, TSH-1.92, Na-128, K-4.2, Bun/Cr, 15/0.8, ALT 29, AST 41, Microalbumin-1.4, Urine Creatinine, 37.4          Carotid Duplex 12-5-2014  1. Chronic occlusion of the left internal carotid artery at its origin. Patent left vertebral artery.  2. Minimal heterogeneous plaque in the right carotid bulb and proximal right internal carotid artery. No right internal carotid artery rate limiting stenosis.    Carotid duplex 8-  CONCLUSIONS  1. Normal right carotid duplex examination.   2. Left internal carotid occlusion.   3. Normal bilateral subclavian and vertebral arterial exam.    CTA Oct 2015:   1. Left internal carotid artery occlusion  2. CTA [place] otherwise within normal limits  3. Probable left deep gray matter venous angioma, incidental  4. Underlying white matter changes are likely from previous small vessel ischemia/infarct    Blood work Oct 2015  LDL 91, nonHdl 143, gfr 74, na 134    CTA October 2016  Persistent occlusion left internal carotid artery  Inferior left basal ganglia and medial left temporal lobe venous angioma  Small vessel disease    Blood work September 2016-sodium 136, potassium 4.6, ALT 19, AST 26, total social to 24, temperature it's 237, HDL 70, , TSH 1.47, free T4 0.89    Carotid Duplex  10/16/17:   Occlusion of the left internal carotid artery.   No significant stenosis of the right carotid or bilateral subclavian or    bilateral vertebral arteries.    Blood work nov 2017 [facility]:  GFR 57, , nonHDL 133, tsh 1.65    Blood work 10/12/2018: NonHDL-139, TG-219, LDL-95, Gluc 93, GFR>90    Blood work 5/8/19:  GFR-80, cmp WNL, NonHDL-125, LDL-93,     Carotid duplex oct 2019:   Normal right carotid exam.    Occlusion of the left internal carotid.    Compared to the prior study of 10/3/18 - there has been no significant    change     Blood Work 10/9/19:  Na -133, K+ 4.5, cret-0.76, bs-114, ALT 25, AST32, GFR >90  No lipid's done   Lab work 3/3/20:  Chol-T 192, trig-98, HDL-62 IDR193, Non-HDL-130    Blood work 7/13/0202  Na 138, K+ 4.1 cret 0.76, , ast 21, alt 21, GFR 84, CHO-Y 223, Trig 197, HDL 67,  Non     Blood work 10/15/2020 (G[facility] Chavez)  TC: 170, Trig 99, LDL 92, nonHDL 112          Medical Decision Making:  Today's Assessment / Status / Plan:     1. Carotid artery dissection (HCC)  Lipid Profile    LIPOPROTEIN A   2. Essential hypertension  Lipid Profile    Comp Metabolic Panel    MICROALBUMIN CREAT RATIO URINE    LIPOPROTEIN A   3. Dyslipidemia  Lipid Profile    LIPOPROTEIN A   4. Hx of TIA (transient ischemic attack) and stroke  LIPOPROTEIN A   5. Carotid occlusion, left       Patient Type: Secondary Prevention    Etiology of Established CVD if Present:   1) Carotid artery dissection  2) Presumed TIA, June 2015    Lipid Management: Qualifies for Statin Therapy Based on 2013 ACC/AHA Guidelines: yes  Calculated 10-Year Risk of ASCVD: N/A  Currently on Statin: No  Patient qualifies for statin therapy based on ACC/AHA guidelines given established ASCVD; however, it was non-atherosclerotic  Has not tolerated statins well in past, and refuses any additional medications for lipid management  Most recent lipid much improved from previous-- explained her cholesterol  "is very responsive to dietary changes.  Recommend she continue these plus add fiber, increase exercise, slow steady weight loss.  Plan.  - Continue to intensify TLC - continue dietary change for decreasing TG as well as active lifestyle  - Continue red yeast rice and plan sterol  - Increase fiber in diet through supplementation at least 20gm   - Consider Zetia in future if needed or low-dose statin    Blood Pressure Management:  ACC/AHA BP goal <130/80  Reasonable control at home; higher in office today.  Pt states this is \"normal\" for her in-office readings  Good control on previous abpm  No secondary cause identified  Did not tolerate Diovan due to fogginess and dizziness - tolerates low dose losartan  Had leg swelling on higher doses of Norvasc  Pt is reluctant to make any dose changes d/t SE's at higher doses  Home monitor 120's/70's  Plan:  - Continue atenolol 50 mg daily  - Continue amlodipine 2.5 mg daily  - Continue spironolactone/ hydrochlorothiazide one half pill daily  - Continue terazosin 1mg qhs  - Continue losartan 25 mg daily  - Continue blood pressure monitoring and BP log    Glycemic Status: Normal    Anti-Platelet/Anti-Coagulant Tx: yes  Patient states she cannot tolerate clopidogrel or aggrenox  - continue aspirin 325mg daily    Smoking: contiSmoking: continue complete avoidance      Physical Activity: Continue training regimen with cross-fit     Weight Management and Nutrition: Dietary plan was discussed with patient at this visit including DASH diet, low carb/low sat fat and avoiding trans fats.     Other:      1. Carotid dissection with history of TIA and unilateral carotid occlusion: workup for vasculitis by rheumatology was negative and no evidence of fibromuscular dysplasia in renal arteries. Continue antiplatelet therapy, risk factor modification, and surveillance. Previous duplex shows stable occlusion and no significant stenosis on the right.  Needs surveillance d/t persistent symptoms of " dizziness. Needs prior auth.      2. TIA with occular symptoms- Continues to have reurrence.  Continue asa and RF mod. Discussed 911 for future TIA's and ER visits.       3. Venous angioma-Asymptomatic. Stable on CTA.  Consider repeat imaging in future, particularly if headaches return.  Otherwise defer to pcp    4. Need for colonoscopy- Completed, repeat in 7 yrs per GI.  Recommended she establish with a family doctor in [place].        Instructed to follow-up with PCP for remainder of adult medical needs: yes  We will partner with other providers in the management of established vascular disease and cardiometabolic risk factors.     Studies to Be Obtained: Carotid duplex Oct 2021  Labs to Be Obtained: CMP, microalbumin, lipid, lp(a)     Follow up in: 6 months      ROXY [email or web address].[email or web address].      CC:  SOPHIE Burton MD

## 2020-11-28 DIAGNOSIS — I10 ESSENTIAL HYPERTENSION: ICD-10-CM

## 2020-11-30 RX ORDER — AMLODIPINE BESYLATE 2.5 MG/1
TABLET ORAL
Qty: 90 TAB | Refills: 3 | Status: SHIPPED | OUTPATIENT
Start: 2020-11-30 | End: 2021-10-20

## 2021-01-23 DIAGNOSIS — I10 ESSENTIAL HYPERTENSION: ICD-10-CM

## 2021-01-25 RX ORDER — SPIRONOLACTONE AND HYDROCHLOROTHIAZIDE 25; 25 MG/1; MG/1
TABLET ORAL
Qty: 45 TAB | Refills: 3 | Status: SHIPPED | OUTPATIENT
Start: 2021-01-25 | End: 2021-12-22

## 2021-04-14 ENCOUNTER — OFFICE VISIT (OUTPATIENT)
Dept: VASCULAR LAB | Facility: MEDICAL CENTER | Age: 61
End: 2021-04-14
Payer: COMMERCIAL

## 2021-04-14 DIAGNOSIS — I65.22 CAROTID OCCLUSION, LEFT: ICD-10-CM

## 2021-04-14 DIAGNOSIS — Z86.73 HX OF TIA (TRANSIENT ISCHEMIC ATTACK) AND STROKE: ICD-10-CM

## 2021-04-14 DIAGNOSIS — E78.5 DYSLIPIDEMIA: ICD-10-CM

## 2021-04-14 DIAGNOSIS — I77.71 CAROTID ARTERY DISSECTION (HCC): ICD-10-CM

## 2021-04-14 DIAGNOSIS — I10 ESSENTIAL HYPERTENSION: ICD-10-CM

## 2021-04-14 PROCEDURE — 99213 OFFICE O/P EST LOW 20 MIN: CPT | Mod: 95,CR | Performed by: NURSE PRACTITIONER

## 2021-04-14 RX ORDER — LYSINE HCL 500 MG
500 TABLET ORAL DAILY
COMMUNITY
End: 2023-03-28

## 2021-04-14 ASSESSMENT — ENCOUNTER SYMPTOMS
NERVOUS/ANXIOUS: 0
BRUISES/BLEEDS EASILY: 1
HEADACHES: 0
SHORTNESS OF BREATH: 0
BLOOD IN STOOL: 0
DIZZINESS: 1
BLURRED VISION: 0
LOSS OF CONSCIOUSNESS: 0
INSOMNIA: 1
DOUBLE VISION: 0
WEIGHT LOSS: 0
FALLS: 0
MYALGIAS: 0
SPEECH CHANGE: 0
PALPITATIONS: 1
FOCAL WEAKNESS: 0

## 2021-04-14 NOTE — PROGRESS NOTES
Visit completed via [employer] due to restrictions of COVID-19 pandemic.  All issues as below were discussed and addressed by no physical exam was performed unless allowed by visual confirmation on Facetime.  If it was felt that patient should be evaluated in the clinic, there were directed there.  Patient verbally consented to Facetime tele-video visit.       .VASCULAR FOLLOW UP VISIT  Subjective:   Juan Luis Rolon is a 61 y.o. female who presents today 4/14/21 for     HPI:   Pt presents for f/u of carotid disease with dissection + unilateral occlusion, HTN, dyslipidemia, and previous TIA.   BPs at home 127-134/74-82  Denies CP, SOB, occasional dizziness with standing  Occasional palpitations at night  Taking plant sterols and red yeast rice  Paying close attention to diet-- particularly carbs  On ASA- no bleeding issues  No TIA or stroke symptoms  Continues exercise routine  Stopped drinking wine     Social History     Tobacco Use   • Smoking status: Never Smoker   • Smokeless tobacco: Never Used   Substance Use Topics   • Alcohol use: Yes     Alcohol/week: 3.5 oz     Types: 7 Glasses of wine per week   • Drug use: No     DIET AND EXERCISE:  Weight Change: up a bit  Diet: common adult  Exercise: moderate regular exercise program- Cross Fit     Review of Systems   Constitutional: Negative for malaise/fatigue and weight loss.   HENT: Negative for nosebleeds.    Eyes: Negative for blurred vision and double vision.   Respiratory: Negative for shortness of breath.    Cardiovascular: Positive for palpitations (rare). Negative for chest pain and leg swelling.   Gastrointestinal: Negative for blood in stool.   Genitourinary: Negative for hematuria.   Musculoskeletal: Negative for falls and myalgias.   Skin: Negative for rash.   Neurological: Positive for dizziness (with rising quickly). Negative for speech change, focal weakness, loss of consciousness and headaches.   Endo/Heme/Allergies: Bruises/bleeds easily.    Psychiatric/Behavioral: The patient has insomnia. The patient is not nervous/anxious.       Objective:     There were no vitals filed for this visit.   There is no height or weight on file to calculate BMI.  Physical Exam   Constitutional: She is oriented to person, place, and time. She appears well-developed and well-nourished.   Musculoskeletal:         General: Normal range of motion.   Neurological: She is alert and oriented to person, place, and time.   Skin: No pallor.   Psychiatric: She has a normal mood and affect. Her behavior is normal.     DATA REVIEW    Labwork 6-24-14: LDL-C, 86, tsh 2.27, urine for ma 0.5, GFR > 60, HDL 73, trig 127    Labwork 6-23-15: LDL-C, 97, HDL-C, 79, Trig- 242, Non HDL-145, TSH-1.92, Na-128, K-4.2, Bun/Cr, 15/0.8, ALT 29, AST 41, Microalbumin-1.4, Urine Creatinine, 37.4          Carotid Duplex 12-5-2014  1. Chronic occlusion of the left internal carotid artery at its origin. Patent left vertebral artery.  2. Minimal heterogeneous plaque in the right carotid bulb and proximal right internal carotid artery. No right internal carotid artery rate limiting stenosis.    Carotid duplex 8-  CONCLUSIONS  1. Normal right carotid duplex examination.   2. Left internal carotid occlusion.   3. Normal bilateral subclavian and vertebral arterial exam.    CTA Oct 2015:   1. Left internal carotid artery occlusion  2. CTA [place] otherwise within normal limits  3. Probable left deep gray matter venous angioma, incidental  4. Underlying white matter changes are likely from previous small vessel ischemia/infarct    Blood work Oct 2015  LDL 91, nonHdl 143, gfr 74, na 134    CTA October 2016  Persistent occlusion left internal carotid artery  Inferior left basal ganglia and medial left temporal lobe venous angioma  Small vessel disease    Blood work September 2016-sodium 136, potassium 4.6, ALT 19, AST 26, total social to 24, temperature it's 237, HDL 70, , TSH 1.47, free T4  0.89    Carotid Duplex 10/16/17:   Occlusion of the left internal carotid artery.   No significant stenosis of the right carotid or bilateral subclavian or    bilateral vertebral arteries.    Blood work nov 2017 [facility]:  GFR 57, , nonHDL 133, tsh 1.65    Blood work 10/12/2018: NonHDL-139, TG-219, LDL-95, Gluc 93, GFR>90    Blood work 5/8/19:  GFR-80, cmp WNL, NonHDL-125, LDL-93,     Carotid duplex oct 2019:   Normal right carotid exam.    Occlusion of the left internal carotid.    Compared to the prior study of 10/3/18 - there has been no significant    change     Blood Work 10/9/19:  Na -133, K+ 4.5, cret-0.76, bs-114, ALT 25, AST32, GFR >90  No lipid's done   Lab work 3/3/20:  Chol-T 192, trig-98, HDL-62 DIA115, Non-HDL-130    Blood work 7/13/0202  Na 138, K+ 4.1 cret 0.76, , ast 21, alt 21, GFR 84, CHO-Y 223, Trig 197, HDL 67,  Non     Blood work 10/15/2020 ([facility])  TC: 170, Trig 99, LDL 92, nonHDL 112          Medical Decision Making:  Today's Assessment / Status / Plan:     1. Carotid artery dissection (HCC)     2. Carotid occlusion, left     3. Dyslipidemia     4. Essential hypertension     5. Hx of TIA (transient ischemic attack) and stroke       Patient Type: Secondary Prevention    Etiology of Established CVD if Present:   1) Carotid artery dissection  2) Presumed TIA, June 2015    Lipid Management: Qualifies for Statin Therapy Based on 2013 ACC/AHA Guidelines: yes  Calculated 10-Year Risk of ASCVD: N/A  Currently on Statin: No  Patient qualifies for statin therapy based on ACC/AHA guidelines given established ASCVD; however, it was non-atherosclerotic  Has not tolerated statins well in past, and refuses any additional medications for lipid management  Most recent lipid much improved from previous-- explained her cholesterol is very responsive to dietary changes.  Recommend she continue these plus add fiber, increase exercise, slow steady weight loss.  Plan.  -  "Continue to intensify TLC - continue dietary change for decreasing TG as well as active lifestyle  - Continue red yeast rice and plan sterol  - Increase fiber in diet through supplementation at least 20gm   - Consider Zetia in future if needed or low-dose statin  -Consider Bergamot 500 to 1500 mg daily    Blood Pressure Management:  ACC/AHA BP goal <130/80  Reasonable control at home; higher in office today.  Pt states this is \"normal\" for her in-office readings  Good control on previous abpm  No secondary cause identified  Did not tolerate Diovan due to fogginess and dizziness - tolerates low dose losartan  Had leg swelling on higher doses of Norvasc  Pt is reluctant to make any dose changes d/t SE's at higher doses  Home monitor 120's/70's  Plan:  - Continue atenolol 50 mg daily  - Continue amlodipine 2.5 mg daily  - Continue spironolactone/ hydrochlorothiazide one half pill daily  - Continue terazosin 1mg qhs  - Continue losartan 25 mg daily  - Continue blood pressure monitoring and BP log    Glycemic Status: Normal    Anti-Platelet/Anti-Coagulant Tx: yes  Patient states she cannot tolerate clopidogrel or aggrenox  - continue aspirin 325mg daily    Smoking: contiSmoking: continue complete avoidance      Physical Activity: Continue training regimen with cross-fit     Weight Management and Nutrition: Dietary plan was discussed with patient at this visit including DASH diet, low carb/low sat fat and avoiding trans fats.     Other:      1. Carotid dissection with history of TIA and unilateral carotid occlusion: workup for vasculitis by rheumatology was negative and no evidence of fibromuscular dysplasia in renal arteries. Continue antiplatelet therapy, risk factor modification, and surveillance. Previous duplex shows stable occlusion and no significant stenosis on the right.  Needs surveillance d/t persistent symptoms of dizziness. Needs prior auth.      2. TIA with occular symptoms- Continues to have reurrence.  " Continue asa and RF mod. Discussed 911 for future TIA's and ER visits.       3. Venous angioma-Asymptomatic. Stable on CTA.  Consider repeat imaging in future, particularly if headaches return.  Otherwise defer to pcp        Instructed to follow-up with PCP for remainder of adult medical needs: yes  We will partner with other providers in the management of established vascular disease and cardiometabolic risk factors.     Studies to Be Obtained: Carotid duplex Oct 2021  Labs to Be Obtained: CMP, microalbumin, lipid, lp(a)     Follow up in: 6 months   Time: 20-29min - - chart review/prep, review of other providers' records, imaging/lab review, face-to-face time for history/examination, ordering, prescribing,  review of results/meds/ treatment plan with patient/family/caregiver, documentation in EMR, care coordination (as needed)   CLARENCE Oleary.      CC:  SOPHIE Burton MD

## 2021-06-24 DIAGNOSIS — I10 ESSENTIAL HYPERTENSION: ICD-10-CM

## 2021-06-25 RX ORDER — LOSARTAN POTASSIUM 25 MG/1
TABLET ORAL
Qty: 90 TABLET | Refills: 3 | Status: SHIPPED | OUTPATIENT
Start: 2021-06-25 | End: 2022-05-31

## 2021-06-25 RX ORDER — TERAZOSIN 1 MG/1
CAPSULE ORAL
Qty: 90 CAPSULE | Refills: 3 | Status: SHIPPED | OUTPATIENT
Start: 2021-06-25 | End: 2022-05-31

## 2021-07-13 DIAGNOSIS — I10 ESSENTIAL HYPERTENSION: ICD-10-CM

## 2021-07-14 RX ORDER — ATENOLOL 50 MG/1
TABLET ORAL
Qty: 90 TABLET | Refills: 1 | Status: SHIPPED | OUTPATIENT
Start: 2021-07-14 | End: 2022-01-03

## 2021-07-26 ENCOUNTER — DOCUMENTATION (OUTPATIENT)
Dept: VASCULAR LAB | Facility: MEDICAL CENTER | Age: 61
End: 2021-07-26

## 2021-07-26 DIAGNOSIS — I77.71 CAROTID ARTERY DISSECTION (HCC): ICD-10-CM

## 2021-07-26 DIAGNOSIS — I65.22 CAROTID OCCLUSION, LEFT: ICD-10-CM

## 2021-10-01 ENCOUNTER — HOSPITAL ENCOUNTER (OUTPATIENT)
Dept: RADIOLOGY | Facility: MEDICAL CENTER | Age: 61
End: 2021-10-01
Attending: NURSE PRACTITIONER
Payer: COMMERCIAL

## 2021-10-01 DIAGNOSIS — I65.22 CAROTID OCCLUSION, LEFT: ICD-10-CM

## 2021-10-01 DIAGNOSIS — I77.71 CAROTID ARTERY DISSECTION (HCC): ICD-10-CM

## 2021-10-01 PROCEDURE — 93880 EXTRACRANIAL BILAT STUDY: CPT

## 2021-10-01 PROCEDURE — 93880 EXTRACRANIAL BILAT STUDY: CPT | Mod: 26 | Performed by: INTERNAL MEDICINE

## 2021-10-04 ENCOUNTER — DOCUMENTATION (OUTPATIENT)
Dept: VASCULAR LAB | Facility: MEDICAL CENTER | Age: 61
End: 2021-10-04

## 2021-10-04 NOTE — PROGRESS NOTES
Carotid duplex shows continued occlusion left internal carotid.  No significant stenosis in right internal carotid    Discussed with patient follow-up visit.  Repeat carotid duplex in 2 years    Michael Bloch, MD  Vascular Medicine

## 2021-10-19 DIAGNOSIS — I10 ESSENTIAL HYPERTENSION: ICD-10-CM

## 2021-10-20 ENCOUNTER — APPOINTMENT (OUTPATIENT)
Dept: VASCULAR LAB | Facility: MEDICAL CENTER | Age: 61
End: 2021-10-20
Attending: INTERNAL MEDICINE
Payer: COMMERCIAL

## 2021-10-20 RX ORDER — AMLODIPINE BESYLATE 2.5 MG/1
TABLET ORAL
Qty: 90 TABLET | Refills: 1 | Status: SHIPPED | OUTPATIENT
Start: 2021-10-20 | End: 2022-04-08

## 2021-10-27 ENCOUNTER — OFFICE VISIT (OUTPATIENT)
Dept: VASCULAR LAB | Facility: MEDICAL CENTER | Age: 61
End: 2021-10-27
Attending: INTERNAL MEDICINE
Payer: COMMERCIAL

## 2021-10-27 VITALS
SYSTOLIC BLOOD PRESSURE: 153 MMHG | BODY MASS INDEX: 27.15 KG/M2 | DIASTOLIC BLOOD PRESSURE: 87 MMHG | WEIGHT: 173 LBS | HEIGHT: 67 IN | HEART RATE: 60 BPM

## 2021-10-27 DIAGNOSIS — I65.22 CAROTID OCCLUSION, LEFT: ICD-10-CM

## 2021-10-27 DIAGNOSIS — E78.5 DYSLIPIDEMIA: ICD-10-CM

## 2021-10-27 DIAGNOSIS — I77.71 CAROTID ARTERY DISSECTION (HCC): ICD-10-CM

## 2021-10-27 DIAGNOSIS — I10 ESSENTIAL HYPERTENSION: ICD-10-CM

## 2021-10-27 DIAGNOSIS — Z86.73 HX OF TIA (TRANSIENT ISCHEMIC ATTACK) AND STROKE: ICD-10-CM

## 2021-10-27 DIAGNOSIS — R42 DIZZINESS ON STANDING: ICD-10-CM

## 2021-10-27 PROCEDURE — 99212 OFFICE O/P EST SF 10 MIN: CPT

## 2021-10-27 PROCEDURE — 99214 OFFICE O/P EST MOD 30 MIN: CPT | Performed by: NURSE PRACTITIONER

## 2021-10-27 NOTE — PROGRESS NOTES
"   VASCULAR FOLLOW UP VISIT  Subjective:   Juan Luis Rolon is a 61 y.o. female who presents today 10/27/21 for     HPI:   Pt presents for f/u of carotid disease with dissection + unilateral occlusion, HTN, dyslipidemia, and previous TIA.   Does not take home BP very often   Denies CP, SOB, dizziness with standing  Has taken statin's approx 10 years ago, cannot remember which ones or dose   Taking plant sterols and red yeast rice  Exercises with cross fit about 5 days per week  On ASA- no bleeding issues  No TIA or stroke symptoms   Drinks wine regularly     Social History     Tobacco Use   • Smoking status: Never Smoker   • Smokeless tobacco: Never Used   Substance Use Topics   • Alcohol use: Yes     Alcohol/week: 3.5 oz     Types: 7 Glasses of wine per week   • Drug use: No     DIET AND EXERCISE:  Weight Change: up a bit  Diet: common adult  Exercise: moderate regular exercise program- Cross Fit      Objective:     Vitals:    10/27/21 1045   BP: 153/87   BP Location: Left arm   Patient Position: Sitting   BP Cuff Size: Adult   Pulse: 60   Weight: 78.5 kg (173 lb)   Height: 1.702 m (5' 7\")      Body mass index is 27.1 kg/m².  Physical Exam  Constitutional:       Appearance: She is well-developed.   Cardiovascular:      Rate and Rhythm: Normal rate and regular rhythm.      Pulses: Normal pulses.      Heart sounds: Normal heart sounds. No murmur heard.     Pulmonary:      Effort: Pulmonary effort is normal. No respiratory distress.      Breath sounds: Normal breath sounds.   Musculoskeletal:         General: Normal range of motion.   Skin:     Coloration: Skin is not pale.   Neurological:      Mental Status: She is alert and oriented to person, place, and time.   Psychiatric:         Behavior: Behavior normal.       DATA REVIEW    Labwork 6-24-14: LDL-C, 86, tsh 2.27, urine for ma 0.5, GFR > 60, HDL 73, trig 127    Labwork 6-23-15: LDL-C, 97, HDL-C, 79, Trig- 242, Non HDL-145, TSH-1.92, Na-128, K-4.2, Bun/Cr, 15/0.8, " ALT 29, AST 41, Microalbumin-1.4, Urine Creatinine, 37.4          Carotid Duplex 12-5-2014  1. Chronic occlusion of the left internal carotid artery at its origin. Patent left vertebral artery.  2. Minimal heterogeneous plaque in the right carotid bulb and proximal right internal carotid artery. No right internal carotid artery rate limiting stenosis.    Carotid duplex 8-  CONCLUSIONS  1. Normal right carotid duplex examination.   2. Left internal carotid occlusion.   3. Normal bilateral subclavian and vertebral arterial exam.    CTA Oct 2015:   1. Left internal carotid artery occlusion  2. CTA [place] otherwise within normal limits  3. Probable left deep gray matter venous angioma, incidental  4. Underlying white matter changes are likely from previous small vessel ischemia/infarct    Blood work Oct 2015  LDL 91, nonHdl 143, gfr 74, na 134    CTA October 2016  Persistent occlusion left internal carotid artery  Inferior left basal ganglia and medial left temporal lobe venous angioma  Small vessel disease    Blood work September 2016-sodium 136, potassium 4.6, ALT 19, AST 26, total social to 24, temperature it's 237, HDL 70, , TSH 1.47, free T4 0.89    Carotid Duplex 10/16/17:   Occlusion of the left internal carotid artery.   No significant stenosis of the right carotid or bilateral subclavian or    bilateral vertebral arteries.    Blood work nov 2017 [facility]:  GFR 57, , nonHDL 133, tsh 1.65    Blood work 10/12/2018: NonHDL-139, TG-219, LDL-95, Gluc 93, GFR>90    Blood work 5/8/19:  GFR-80, cmp WNL, NonHDL-125, LDL-93,     Carotid duplex oct 2019:   Normal right carotid exam.    Occlusion of the left internal carotid.    Compared to the prior study of 10/3/18 - there has been no significant    change     Blood Work 10/9/19:  Na -133, K+ 4.5, cret-0.76, bs-114, ALT 25, AST32, GFR >90  No lipid's done   Lab work 3/3/20:  Chol-T 192, trig-98, HDL-62 [id number], Non-HDL-130    Blood  work 7/13/0202  Na 138, K+ 4.1 cret 0.76, , ast 21, alt 21, GFR 84, CHO-Y 223, Trig 197, HDL 67,  Non     [facility]  TC: 170, Trig 99, LDL 92, nonHDL 112          Medical Decision Making:  Today's Assessment / Status / Plan:     1. Essential hypertension  Lipid Profile    Comp Metabolic Panel   2. Carotid artery dissection (HCC)  Lipid Profile   3. Dyslipidemia     4. Hx of TIA (transient ischemic attack) and stroke     5. Carotid occlusion, left     6. Dizziness on standing       Patient Type: Secondary Prevention    Etiology of Established CVD if Present:   1) Carotid artery dissection  2) Presumed TIA, June 2015    Lipid Management: Qualifies for Statin Therapy Based on 2013 ACC/AHA Guidelines: yes  Calculated 10-Year Risk of ASCVD: N/A  Currently on Statin: No  Patient qualifies for statin therapy based on ACC/AHA guidelines given established ASCVD; however, it was non-atherosclerotic  Has not tolerated statins well in past, and refuses any additional medications for lipid management  Discussed her overall increased risk d/t elevated lp(a) 169  LDL has consistently been >100, none on record <70  Recommended re-trial of statin therapy, even low dose to reduce her overall risk of MI/stroke  Also discussed non-statin therapy, which likely would not reduce her LDL to goal of <70 but since she is very reluctant to trial statin, did offer this as an alternative.  She declines either above options today, despite our lengthy discussion.     Plan:  - Continue to intensify TLC - continue dietary change for decreasing TG as well as active lifestyle  - Continue red yeast rice and plant sterol  - Increase fiber in diet through supplementation   - Consider Zetia in future if needed or low-dose statin- declines today  - Increase Bergamot 1500 mg daily    Blood Pressure Management:  ACC/AHA BP goal <130/80  Reasonable control at home; higher in office today.    Good control on  previous abpm  No secondary cause identified  Did not tolerate Diovan due to fogginess and dizziness - tolerates low dose losartan  Had leg swelling on higher doses of Norvasc  Dizziness with position changes frequently problamatic  No consistent readings from home   Plan:  - Continue atenolol 50 mg daily  - Continue amlodipine 2.5 mg daily  - Continue spironolactone/ hydrochlorothiazide one half pill daily  - Continue terazosin 1mg qhs  - Continue losartan 25 mg daily  - Begin monitoring BP at home consistently, note when dizzy on log     Glycemic Status: Normal    Anti-Platelet/Anti-Coagulant Tx: yes  Patient states she cannot tolerate clopidogrel or aggrenox  - continue aspirin 325mg daily    Smoking: contiSmoking: continue complete avoidance      Physical Activity: Continue training regimen with cross-fit     Weight Management and Nutrition: Dietary plan was discussed with patient at this visit including DASH diet, low carb/low sat fat and avoiding trans fats.     Other:      1. Carotid dissection with history of TIA and unilateral carotid occlusion: workup for vasculitis by rheumatology was negative and no evidence of fibromuscular dysplasia in renal arteries. Continue antiplatelet therapy, risk factor modification, and surveillance. Previous duplex shows stable occlusion and no significant stenosis on the right.  Needs surveillance d/t persistent symptoms of dizziness.  Recent imaging stable compared to previous.       2. TIA with occular symptoms- No recurrence. Continue asa and RF mod. Discussed 911 for future TIA's and ER visits.       3. Venous angioma-Asymptomatic. Stable on CTA.  Consider repeat imaging in future, particularly if headaches return.  Otherwise defer to pcp     Instructed to follow-up with PCP for remainder of adult medical needs: yes  We will partner with other providers in the management of established vascular disease and cardiometabolic risk factors.     Studies to Be Obtained: Carotid  duplex Oct 2023  Labs to Be Obtained: lipid, CMP     Follow up in: 6 months     Alba SAEZ  [facility]

## 2021-12-21 DIAGNOSIS — I10 ESSENTIAL HYPERTENSION: ICD-10-CM

## 2021-12-22 RX ORDER — SPIRONOLACTONE AND HYDROCHLOROTHIAZIDE 25; 25 MG/1; MG/1
TABLET ORAL
Qty: 45 TABLET | Refills: 3 | Status: SHIPPED | OUTPATIENT
Start: 2021-12-22 | End: 2022-11-28

## 2021-12-30 DIAGNOSIS — I10 ESSENTIAL HYPERTENSION: ICD-10-CM

## 2022-01-03 RX ORDER — ATENOLOL 50 MG/1
TABLET ORAL
Qty: 90 TABLET | Refills: 3 | Status: SHIPPED | OUTPATIENT
Start: 2022-01-03 | End: 2023-02-27

## 2022-03-31 ENCOUNTER — OFFICE VISIT (OUTPATIENT)
Dept: VASCULAR LAB | Facility: MEDICAL CENTER | Age: 62
End: 2022-03-31
Attending: FAMILY MEDICINE
Payer: COMMERCIAL

## 2022-03-31 VITALS
HEART RATE: 68 BPM | WEIGHT: 172.2 LBS | HEIGHT: 67 IN | DIASTOLIC BLOOD PRESSURE: 84 MMHG | BODY MASS INDEX: 27.03 KG/M2 | SYSTOLIC BLOOD PRESSURE: 171 MMHG

## 2022-03-31 DIAGNOSIS — Z79.02 LONG TERM (CURRENT) USE OF ANTITHROMBOTICS/ANTIPLATELETS: ICD-10-CM

## 2022-03-31 DIAGNOSIS — E78.41 ELEVATED LIPOPROTEIN(A): ICD-10-CM

## 2022-03-31 DIAGNOSIS — Z86.73 HX OF TIA (TRANSIENT ISCHEMIC ATTACK) AND STROKE: ICD-10-CM

## 2022-03-31 DIAGNOSIS — I65.22 CAROTID OCCLUSION, LEFT: ICD-10-CM

## 2022-03-31 DIAGNOSIS — I10 ESSENTIAL HYPERTENSION: ICD-10-CM

## 2022-03-31 DIAGNOSIS — I77.71 CAROTID ARTERY DISSECTION (HCC): ICD-10-CM

## 2022-03-31 DIAGNOSIS — E78.5 DYSLIPIDEMIA: ICD-10-CM

## 2022-03-31 PROBLEM — E66.3 OVERWEIGHT (BMI 25.0-29.9): Status: ACTIVE | Noted: 2021-03-30

## 2022-03-31 PROCEDURE — 99212 OFFICE O/P EST SF 10 MIN: CPT

## 2022-03-31 PROCEDURE — 99214 OFFICE O/P EST MOD 30 MIN: CPT | Performed by: FAMILY MEDICINE

## 2022-03-31 RX ORDER — DOXEPIN 3 MG/1
3-6 TABLET, FILM COATED ORAL NIGHTLY PRN
COMMUNITY
Start: 2022-01-20 | End: 2023-07-24

## 2022-03-31 RX ORDER — AMPICILLIN TRIHYDRATE 250 MG
CAPSULE ORAL
COMMUNITY
End: 2023-01-03

## 2022-03-31 RX ORDER — LYSINE 500 MG
500 TABLET ORAL DAILY
COMMUNITY

## 2022-03-31 RX ORDER — CICLOPIROX 80 MG/ML
SOLUTION TOPICAL
COMMUNITY
Start: 2022-03-11 | End: 2023-07-24

## 2022-03-31 ASSESSMENT — ENCOUNTER SYMPTOMS
MYALGIAS: 0
COUGH: 0
CLAUDICATION: 0
BRUISES/BLEEDS EASILY: 0
ORTHOPNEA: 0
PALPITATIONS: 0
CHILLS: 0
NAUSEA: 0
FEVER: 0
WEAKNESS: 0

## 2022-03-31 NOTE — PROGRESS NOTES
VASCULAR FOLLOW UP VISIT  Subjective:   Juan Luis Rolon is a 61 y.o. female who presents today 10/27/21 for   Pt presents for f/u of carotid disease with dissection + unilateral occlusion, HTN, dyslipidemia, and previous TIA.   Subjective     Carotid artery disease, hx of dissection and occlusion:   Stable  Hx of TIA - currently no sx.      HTN:  No interval concerns, tolerating meds, no sx.  Reports today's BP elevations are likely stress related due to fast walking and parking issues   Home BP los/70s  Adherence to current HTN meds: compliant all of the time     Antithrombotic therapy:   ASA 325mg, no bleeding/bruising reported      Hyperlipidemia:   Stable, no current concerns, tolerating meds   Current treatment: lifestyle changes , red yeast rice extract      Current Outpatient Medications:   •  Doxepin HCl, Take 3-6 mg by mouth.  •  atenolol, TAKE 1 TABLET BY MOUTH  DAILY, Taking  •  spironolactone/hctz, TAKE ONE-HALF TABLET BY  MOUTH DAILY, Taking  •  amLODIPine, TAKE 1 TABLET BY MOUTH  DAILY, Taking  •  losartan, TAKE 1 TABLET BY MOUTH  DAILY, Taking  •  terazosin, TAKE 1 CAPSULE BY MOUTH  EVERY NIGHT AT BEDTIME, Taking  •  lysine, 500 mg, Oral, DAILY, Taking  •  aspirin, 325 mg, Oral, Q6HRS PRN, PRN  •  ALPRAZolam, , PRN  •  buPROPion SR, 150 mg, Oral, DAILY, Taking  •  docosahexanoic acid, 1,000 mg, Oral, DAILY, Taking  •  Red Yeast Rice, Take  by mouth.  •  ciclopirox, APPLY TO NAILS NIGHTLY FOR UP TO 48 WEEKS  •  L-Lysine, 500 mg, Oral, DAILY  •  tretinoin, APPLY TO FACE NIGHTLY     Social History     Tobacco Use   • Smoking status: Never Smoker   • Smokeless tobacco: Never Used   Substance Use Topics   • Alcohol use: Yes     Alcohol/week: 3.5 oz     Types: 7 Glasses of wine per week   • Drug use: No     DIET AND EXERCISE:  Weight Change: up a bit  Diet: common adult  Exercise: moderate regular exercise program- Cross Fit  Review of Systems   Constitutional: Negative for chills and fever.  "  Respiratory: Negative for cough.    Cardiovascular: Negative for chest pain, palpitations, orthopnea, claudication and leg swelling.   Gastrointestinal: Negative for nausea.   Musculoskeletal: Negative for myalgias.   Neurological: Negative for weakness.   Endo/Heme/Allergies: Does not bruise/bleed easily.        Objective         Vitals:    03/31/22 1319 03/31/22 1322   BP: (!) 178/99 (!) 171/84   BP Location: Left arm Left arm   Patient Position: Sitting Sitting   BP Cuff Size: Adult Adult   Pulse: 75 68   Weight: 78.1 kg (172 lb 3.2 oz)    Height: 1.702 m (5' 7\")       BP Readings from Last 5 Encounters:   03/31/22 (!) 171/84   10/27/21 153/87   12/23/19 134/82   10/28/19 158/96   06/12/19 141/83      Body mass index is 26.97 kg/m².  Physical Exam  Constitutional:       General: She is not in acute distress.     Appearance: She is well-developed. She is not diaphoretic.   HENT:      Head: Normocephalic and atraumatic.   Cardiovascular:      Rate and Rhythm: Normal rate and regular rhythm.      Pulses:           Carotid pulses are 2+ on the right side and 0 on the left side.     Heart sounds: Normal heart sounds. No murmur heard.    No friction rub. No gallop.   Pulmonary:      Effort: Pulmonary effort is normal. No respiratory distress.      Breath sounds: Normal breath sounds.   Musculoskeletal:         General: No tenderness. Normal range of motion.      Cervical back: Normal range of motion and neck supple.   Skin:     General: Skin is warm and dry.      Coloration: Skin is not pale.   Neurological:      Mental Status: She is alert and oriented to person, place, and time.   Psychiatric:         Behavior: Behavior normal.       DATA REVIEW    Labwork 6-24-14: LDL-C, 86, tsh 2.27, urine for ma 0.5, GFR > 60, HDL 73, trig 127    Labwork 6-23-15: LDL-C, 97, HDL-C, 79, Trig- 242, Non HDL-145, TSH-1.92, Na-128, K-4.2, Bun/Cr, 15/0.8, ALT 29, AST 41, Microalbumin-1.4, Urine Creatinine, 37.4          No results " found for: CHOLSTRLTOT, LDL, HDL, TRIGLYCERIDE    No results found for: SODIUM, POTASSIUM, CHLORIDE, CO2, GLUCOSE, BUN, CREATININE, BUNCREATRAT, GLOMRATE  No results found for: ALKPHOSPHAT, ASTSGOT, ALTSGPT, TBILIRUBIN    No results found for: LIPOPROTA   No results found for: APOB      No results found for: MICROALBCALC, MALBCRT, MALBEXCR, [id number], MICROALBUR, MICRALB, UMICROALBUM, MICROALBTIM       Blood Work 10/9/19:  Na -133, K+ 4.5, cret-0.76, bs-114, ALT 25, AST32, GFR >90  No lipid's done   Lab work 3/3/20:  Chol-T 192, trig-98, HDL-62 [id number], Non-HDL-130    Blood work 7/13/0202  Na 138, K+ 4.1 cret 0.76, , ast 21, alt 21, GFR 84, CHO-Y 223, Trig 197, HDL 67,  Non     Blood work 10/15/2020 ([facility])  TC: 170, Trig 99, LDL 92, nonHDL 112    [facility] 3/17/22                       IMAGING:     Carotid Duplex 12-5-2014  1. Chronic occlusion of the left internal carotid artery at its origin. Patent left vertebral artery.  2. Minimal heterogeneous plaque in the right carotid bulb and proximal right internal carotid artery. No right internal carotid artery rate limiting stenosis.    Carotid duplex 8-  CONCLUSIONS  1. Normal right carotid duplex examination.   2. Left internal carotid occlusion.   3. Normal bilateral subclavian and vertebral arterial exam.    CTA Oct 2015:   1. Left internal carotid artery occlusion  2. CTA [place] otherwise within normal limits  3. Probable left deep gray matter venous angioma, incidental  4. Underlying white matter changes are likely from previous small vessel ischemia/infarct    Blood work Oct 2015  LDL 91, nonHdl 143, gfr 74, na 134    CTA October 2016  Persistent occlusion left internal carotid artery  Inferior left basal ganglia and medial left temporal lobe venous angioma  Small vessel disease    Blood work September 2016-sodium 136, potassium 4.6, ALT 19, AST 26, total social to 24, temperature it's 237, HDL 70, , TSH 1.47,  free T4 0.89    Carotid Duplex 10/16/17:   Occlusion of the left internal carotid artery.   No significant stenosis of the right carotid or bilateral subclavian or    bilateral vertebral arteries.    Blood work nov 2017 [facility]:  GFR 57, , nonHDL 133, tsh 1.65    Blood work 10/12/2018: NonHDL-139, TG-219, LDL-95, Gluc 93, GFR>90    Blood work 5/8/19:  GFR-80, cmp WNL, NonHDL-125, LDL-93,     Carotid duplex oct 2019:   Normal right carotid exam.    Occlusion of the left internal carotid.    Compared to the prior study of 10/3/18 - there has been no significant    change    Carotid 10/2021     The bilateral subclavian and vertebral artery waveforms are antegrade and    normal in character and velocity.   Known occlusion of the LEFT internal carotid artery.     Normal right carotid exam.            Medical Decision Making:  Today's Assessment / Status / Plan:     1. Carotid artery dissection (HCC)     2. Carotid occlusion, left     3. Hx of TIA (transient ischemic attack) and stroke     4. Dyslipidemia     5. Essential hypertension     6. Elevated lipoprotein(a)     7. Long term (current) use of antithrombotics/antiplatelets       Patient Type: Secondary Prevention    Etiology of Established CVD if Present:     1. Carotid dissection with history of TIA and unilateral carotid occlusion: workup for vasculitis by rheumatology was negative and no evidence of fibromuscular dysplasia in renal arteries.   Recent imaging stable compared to previous.  - continue secondary prevention med mgmt   - Continue antiplatelet therapy, risk factor modification, and surveillance.        2. TIA with occular symptoms, 2016- No recurrence.   -Continue asa and RF mod. Discussed 911 for future TIA's and ER visits.       3. Venous angioma-Asymptomatic. Stable on CTA.    -Consider repeat imaging in future, particularly if headaches return.  Otherwise defer to pcp    Lipid Management:   Qualifies for Statin Therapy Based on 2018  ACC/AHA Guidelines: yes  The ASCVD Risk score (Betsyivan KRISHNA Jr, et al., 2013) failed to calculate. n/a   Currently on Statin: No  Patient qualifies for statin therapy based on ACC/AHA guidelines given established ASCVD; however, it was non-atherosclerotic  Has not tolerated statins well in past, and refuses any additional medications for lipid management  Discussed her overall increased risk d/t elevated lp(a) 169  LDL has consistently been >100, none on record <70  Recommended re-trial of statin therapy, even low dose to reduce her overall risk of MI/stroke  Also discussed non-statin therapy, which likely would not reduce her LDL to goal of <70 but since she is very reluctant to trial statin, did offer this as an alternative.  She declines either above options today, despite our lengthy discussion.     Lp(a) = 169 - independent risk factor - reviewed niacin but unlikely CV benefit and likely intolerable side effects   Plan:  - Continue to intensify TLC - continue dietary change for decreasing TG as well as active lifestyle  - gave handout on lp(a)   - unlikely benefiting from Red yeast rice so can stop along with bergamot   - recommend initiation of liquid plant sterols and berberine   - continue fiber in diet through supplementation   - Consider Zetia in future if needed or low-dose statin- declines today    Blood Pressure Management:  ACC/AHA BP goal <130/80  Reasonable control at home; high in office- white coat effect     Good control on previous abpm  No secondary cause identified  Did not tolerate Diovan due to fogginess and dizziness - tolerates low dose losartan  Had leg swelling on higher doses of Norvasc  Dizziness with position changes frequently problamatic  No consistent readings from home   Plan:  - Continue atenolol 50 mg daily  - Continue amlodipine 2.5 mg daily  - Continue spironolactone/ hydrochlorothiazide one half pill daily  - Continue terazosin 1mg qhs  - Continue losartan 25 mg daily  - Begin  monitoring BP at home consistently, note when dizzy on log     Glycemic Status: Normal    Anti-Platelet/Anti-Coagulant Tx: yes  Patient states she cannot tolerate clopidogrel or aggrenox  - continue aspirin 325mg daily    LIFESTYLE INTERVENTIONS:    SMOKING:   reports that she has never smoked. She has never used smokeless tobacco.   - continued complete avoidance of all tobacco products     PHYSICAL ACTIVITY: continue healthy activity to improve CV fitness.  In general, targeting >150min/week of moderate-level activity.     WEIGHT MANAGEMENT AND NUTRITION: Dietary plan was discussed with patient at this visit including [place] diet       Other: none      Instructed to follow-up with PCP for remainder of adult medical needs: yes  We will partner with other providers in the management of established vascular disease and cardiometabolic risk factors.     Studies to Be Obtained: Carotid duplex Oct 2023 - not ordered, aprn to track   Labs to Be Obtained: none      Follow up in: 6 months     Skinny Donnelly M.D.  [facility]   [facility]   [phone]

## 2022-04-07 DIAGNOSIS — I10 ESSENTIAL HYPERTENSION: ICD-10-CM

## 2022-04-08 RX ORDER — AMLODIPINE BESYLATE 2.5 MG/1
TABLET ORAL
Qty: 90 TABLET | Refills: 3 | Status: SHIPPED | OUTPATIENT
Start: 2022-04-08 | End: 2023-03-13

## 2022-11-24 DIAGNOSIS — I10 ESSENTIAL HYPERTENSION: ICD-10-CM

## 2022-11-28 RX ORDER — SPIRONOLACTONE AND HYDROCHLOROTHIAZIDE 25; 25 MG/1; MG/1
TABLET ORAL
Qty: 45 TABLET | Refills: 3 | Status: SHIPPED | OUTPATIENT
Start: 2022-11-28 | End: 2023-10-13

## 2023-01-03 ENCOUNTER — OFFICE VISIT (OUTPATIENT)
Dept: VASCULAR LAB | Facility: MEDICAL CENTER | Age: 63
End: 2023-01-03
Attending: NURSE PRACTITIONER
Payer: COMMERCIAL

## 2023-01-03 ENCOUNTER — HOSPITAL ENCOUNTER (OUTPATIENT)
Dept: CARDIOLOGY | Facility: MEDICAL CENTER | Age: 63
End: 2023-01-03
Attending: NURSE PRACTITIONER
Payer: COMMERCIAL

## 2023-01-03 VITALS
HEART RATE: 61 BPM | DIASTOLIC BLOOD PRESSURE: 74 MMHG | HEIGHT: 67 IN | BODY MASS INDEX: 26.84 KG/M2 | WEIGHT: 171 LBS | SYSTOLIC BLOOD PRESSURE: 132 MMHG

## 2023-01-03 DIAGNOSIS — I10 ESSENTIAL HYPERTENSION: ICD-10-CM

## 2023-01-03 DIAGNOSIS — R73.01 IFG (IMPAIRED FASTING GLUCOSE): ICD-10-CM

## 2023-01-03 DIAGNOSIS — E78.41 ELEVATED LIPOPROTEIN(A): ICD-10-CM

## 2023-01-03 DIAGNOSIS — Z86.73 HX OF TIA (TRANSIENT ISCHEMIC ATTACK) AND STROKE: ICD-10-CM

## 2023-01-03 DIAGNOSIS — E78.5 DYSLIPIDEMIA: ICD-10-CM

## 2023-01-03 DIAGNOSIS — I77.71 CAROTID ARTERY DISSECTION (HCC): ICD-10-CM

## 2023-01-03 DIAGNOSIS — R00.2 PALPITATIONS: ICD-10-CM

## 2023-01-03 LAB — EKG IMPRESSION: NORMAL

## 2023-01-03 PROCEDURE — 99214 OFFICE O/P EST MOD 30 MIN: CPT | Performed by: NURSE PRACTITIONER

## 2023-01-03 PROCEDURE — 93005 ELECTROCARDIOGRAM TRACING: CPT | Performed by: NURSE PRACTITIONER

## 2023-01-03 PROCEDURE — 93010 ELECTROCARDIOGRAM REPORT: CPT | Performed by: INTERNAL MEDICINE

## 2023-01-03 PROCEDURE — 99212 OFFICE O/P EST SF 10 MIN: CPT

## 2023-01-03 ASSESSMENT — ENCOUNTER SYMPTOMS
MYALGIAS: 0
SHORTNESS OF BREATH: 0
CLAUDICATION: 0
BLOOD IN STOOL: 0
HEADACHES: 0
PALPITATIONS: 1
WEAKNESS: 0
NERVOUS/ANXIOUS: 0
NAUSEA: 0
BRUISES/BLEEDS EASILY: 0
BLURRED VISION: 0
WHEEZING: 0
DIZZINESS: 0
FEVER: 0
DOUBLE VISION: 0
CHILLS: 0
COUGH: 0
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
## 2023-01-17 ENCOUNTER — HOSPITAL ENCOUNTER (OUTPATIENT)
Dept: CARDIOLOGY | Facility: MEDICAL CENTER | Age: 63
End: 2023-01-17
Attending: NURSE PRACTITIONER
Payer: COMMERCIAL

## 2023-01-17 DIAGNOSIS — I10 ESSENTIAL HYPERTENSION: ICD-10-CM

## 2023-01-17 DIAGNOSIS — R00.2 PALPITATIONS: ICD-10-CM

## 2023-01-17 LAB
LV EJECT FRACT  99904: 60
LV EJECT FRACT MOD 2C 99903: 33.14
LV EJECT FRACT MOD 4C 99902: 72.14
LV EJECT FRACT MOD BP 99901: 56.51

## 2023-01-17 PROCEDURE — 93306 TTE W/DOPPLER COMPLETE: CPT

## 2023-01-17 PROCEDURE — 93306 TTE W/DOPPLER COMPLETE: CPT | Mod: 26 | Performed by: INTERNAL MEDICINE

## 2023-02-24 DIAGNOSIS — I10 ESSENTIAL HYPERTENSION: ICD-10-CM

## 2023-02-27 RX ORDER — ATENOLOL 50 MG/1
TABLET ORAL
Qty: 90 TABLET | Refills: 3 | Status: SHIPPED | OUTPATIENT
Start: 2023-02-27

## 2023-03-10 DIAGNOSIS — I10 ESSENTIAL HYPERTENSION: ICD-10-CM

## 2023-03-13 RX ORDER — AMLODIPINE BESYLATE 2.5 MG/1
TABLET ORAL
Qty: 90 TABLET | Refills: 3 | Status: SHIPPED | OUTPATIENT
Start: 2023-03-13 | End: 2024-02-16

## 2023-03-22 ENCOUNTER — DOCUMENTATION (OUTPATIENT)
Dept: VASCULAR LAB | Facility: MEDICAL CENTER | Age: 63
End: 2023-03-22
Payer: MEDICARE

## 2023-03-22 ENCOUNTER — TELEPHONE (OUTPATIENT)
Dept: VASCULAR LAB | Facility: MEDICAL CENTER | Age: 63
End: 2023-03-22
Payer: MEDICARE

## 2023-03-22 NOTE — TELEPHONE ENCOUNTER
Spoke with Dr. Adame at [facility]; pt has new onset a-fib with RVR.  Through [facility], ok to start OAC therapy with DOAC.  Will f/u with pt, as scheduled, next wk.  Hold ASA while on DOAC.      NADJA Mansfield  [facility]

## 2023-03-22 NOTE — PROGRESS NOTES
Patient called our Vascular Office. Patient [place] the fire station on [place], and they did a 12 lead and patient was diagnosis with AFib.(Which is new for the patient)     Patient has been having heart irregular palpitations since yesterday afternoon, and is still having irregular heart beats. Patient has been also dizzy. Patient lives in [facility], and is contemplating going to the ER.  I advise patient to also seek ER/UC if this continues.    Sent staff message to Vascular Providers to advise.  Rina Saenz response-  Yes, ER! She needs a cardiologist consult, so ER would be the best!     Called and spoke with patient to reiterate that she go to ER.

## 2023-03-28 ENCOUNTER — DOCUMENTATION (OUTPATIENT)
Dept: PHARMACY | Facility: MEDICAL CENTER | Age: 63
End: 2023-03-28

## 2023-03-28 ENCOUNTER — OFFICE VISIT (OUTPATIENT)
Dept: VASCULAR LAB | Facility: MEDICAL CENTER | Age: 63
End: 2023-03-28
Attending: NURSE PRACTITIONER
Payer: MEDICARE

## 2023-03-28 VITALS
SYSTOLIC BLOOD PRESSURE: 137 MMHG | HEIGHT: 67 IN | BODY MASS INDEX: 26.84 KG/M2 | HEART RATE: 71 BPM | WEIGHT: 171 LBS | DIASTOLIC BLOOD PRESSURE: 80 MMHG

## 2023-03-28 DIAGNOSIS — E78.5 DYSLIPIDEMIA: ICD-10-CM

## 2023-03-28 DIAGNOSIS — I48.0 PAROXYSMAL ATRIAL FIBRILLATION (HCC): ICD-10-CM

## 2023-03-28 DIAGNOSIS — D68.59 HYPERCOAGULABLE STATE (HCC): ICD-10-CM

## 2023-03-28 DIAGNOSIS — Z86.73 HX OF TIA (TRANSIENT ISCHEMIC ATTACK) AND STROKE: ICD-10-CM

## 2023-03-28 DIAGNOSIS — I77.71 CAROTID ARTERY DISSECTION (HCC): ICD-10-CM

## 2023-03-28 DIAGNOSIS — I10 ESSENTIAL HYPERTENSION: ICD-10-CM

## 2023-03-28 DIAGNOSIS — R73.01 IFG (IMPAIRED FASTING GLUCOSE): ICD-10-CM

## 2023-03-28 PROCEDURE — 99214 OFFICE O/P EST MOD 30 MIN: CPT | Performed by: NURSE PRACTITIONER

## 2023-03-28 PROCEDURE — 99212 OFFICE O/P EST SF 10 MIN: CPT

## 2023-03-28 ASSESSMENT — ENCOUNTER SYMPTOMS
BRUISES/BLEEDS EASILY: 0
PALPITATIONS: 1
NERVOUS/ANXIOUS: 0
BLURRED VISION: 0
WHEEZING: 0
NAUSEA: 0
BLOOD IN STOOL: 0
DOUBLE VISION: 0
CLAUDICATION: 0
DIZZINESS: 0
COUGH: 0
CHILLS: 0
SHORTNESS OF BREATH: 0
WEAKNESS: 0
HEADACHES: 0
MYALGIAS: 0
FEVER: 0

## 2023-03-28 NOTE — PROGRESS NOTES
Drug: Eliquis 5mg tablets #180 tablets       Status: NO PA Needed  Copay: refill too soon til 04/14/23    Fill with [facility]: Yes    Will outreach to offer services and/or release to preferred pharmacy
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
## 2023-04-12 DIAGNOSIS — D68.59 HYPERCOAGULABLE STATE (HCC): ICD-10-CM

## 2023-04-28 ENCOUNTER — TELEPHONE (OUTPATIENT)
Dept: CARDIOLOGY | Facility: MEDICAL CENTER | Age: 63
End: 2023-04-28
Payer: MEDICARE

## 2023-04-28 NOTE — TELEPHONE ENCOUNTER
CHART PREP    S/W patient in regards to requesting outside cardiac records for EP NP apt with Dr. Garcia.  Patient was recently hospitalized at [facility] ER. Patient had an EKG done and a Cardioversion. Patient has a stress test scheduled with [facility] as well, but that is not until after NP apt with Dr. Garcia. No other cardiac testing, imaging or procedures outside of [facility]. Pt verbally confirmed time/date/location of apt.     Records requested from [facility] via fax    Fax # [phone]     Confirmation fax received.

## 2023-05-05 ENCOUNTER — OFFICE VISIT (OUTPATIENT)
Dept: CARDIOLOGY | Facility: MEDICAL CENTER | Age: 63
End: 2023-05-05
Payer: MEDICARE

## 2023-05-05 ENCOUNTER — TELEPHONE (OUTPATIENT)
Dept: CARDIOLOGY | Facility: MEDICAL CENTER | Age: 63
End: 2023-05-05

## 2023-05-05 VITALS
RESPIRATION RATE: 12 BRPM | SYSTOLIC BLOOD PRESSURE: 146 MMHG | BODY MASS INDEX: 27 KG/M2 | HEIGHT: 67 IN | HEART RATE: 71 BPM | OXYGEN SATURATION: 96 % | WEIGHT: 172 LBS | DIASTOLIC BLOOD PRESSURE: 94 MMHG

## 2023-05-05 DIAGNOSIS — I48.0 PAROXYSMAL ATRIAL FIBRILLATION (HCC): ICD-10-CM

## 2023-05-05 PROCEDURE — 93000 ELECTROCARDIOGRAM COMPLETE: CPT | Performed by: INTERNAL MEDICINE

## 2023-05-05 PROCEDURE — 99204 OFFICE O/P NEW MOD 45 MIN: CPT | Mod: 25 | Performed by: INTERNAL MEDICINE

## 2023-05-05 NOTE — TELEPHONE ENCOUNTER
Patient scheduled for ablation on 6-23-23 with Dr. Garcia. Patient has been instructed to check in at 10:30 for 12:30 case time. Message sent to authorizations. Emailed Carto.

## 2023-05-05 NOTE — PROGRESS NOTES
/70   Pulse 72   Temp 98.7 °F (37.1 °C)   Resp 16   Ht 5' 9\" (1.753 m)   Wt 198 lb (89.8 kg)   LMP 02/06/2018 (Exact Date)   BMI 29.24 kg/m²   This patient is one-week post excision of sebaceous cyst from an area of the tattoo of the right shoulder. Her wound healed satisfactorily and all sutures are removed and the scar appeared fine and there is no significant disruption to the tattoo itself. She may return when necessary. Arrhythmia Clinic Note (New patient)     DOS: 5/5/2023    Referring physician: Dr Samuels    Chief complaint/Reason for consult: Afib    HPI: 64 y/o F with afib. Notes palpitations since December. In March had episode of Afib with RVR which did not resolve for hours, went to ED and was cardioverted. Started on Eliquis. Referred to EP. No further sustained Afib episodes since then. Does not want to be on medications long term and is reluctant to take Eliquis long term.     ROS (+ highlighted in bold):  Constitutional: Fevers/chills/fatigue/weightloss  HEENT: Blurry vision/eye pain/sore throat/hearing loss  Respiratory: Shortness of breath/cough  Cardiovascular: Chest pain/palpitations/edema/orthopnea/syncope  GI: Nausea/vomitting/diarrhea  MSK: Arthralgias/myagias/muscle weakness  Skin: Rash/sores  Neurological: Numbness/tremors/vertigo  Endocrine: Excessive thirst/polyuria/cold intolerance/heat intolerance  Psych: Depression/anxiety    History reviewed. No pertinent past medical history.  Afib  HTN  TIA  Carotid disease    History reviewed. No pertinent surgical history.    Social History     Socioeconomic History    Marital status:      Spouse name: Not on file    Number of children: Not on file    Years of education: Not on file    Highest education level: Not on file   Occupational History    Not on file   Tobacco Use    Smoking status: Never    Smokeless tobacco: Never   Substance and Sexual Activity    Alcohol use: Yes     Alcohol/week: 3.5 oz     Types: 7 Glasses of wine per week    Drug use: No    Sexual activity: Not on file   Other Topics Concern    Not on file   Social History Narrative    Not on file     Social Determinants of Health     Financial Resource Strain: Not on file   Food Insecurity: Not on file   Transportation Needs: Not on file   Physical Activity: Not on file   Stress: Not on file   Social Connections: Not on file   Intimate Partner Violence: Not on file   Housing Stability: Not on  "file       History reviewed. No pertinent family history.    Allergies   Allergen Reactions    Atorvastatin     Codeine Vomiting    Morphine Vomiting    Plavix [Clopidogrel Bisulfate] Hives    Warfarin Hives       Current Outpatient Medications   Medication Sig Dispense Refill    losartan (COZAAR) 25 MG Tab TAKE 1 TABLET BY MOUTH  DAILY 90 Tablet 3    terazosin (HYTRIN) 1 MG Cap TAKE 1 CAPSULE BY MOUTH  EVERY NIGHT AT BEDTIME 90 Capsule 3    apixaban (ELIQUIS) 5mg Tab Take 1 Tablet by mouth 2 times a day. 180 Tablet 3    amLODIPine (NORVASC) 2.5 MG Tab TAKE 1 TABLET BY MOUTH  DAILY 90 Tablet 3    atenolol (TENORMIN) 50 MG Tab TAKE 1 TABLET BY MOUTH  DAILY 90 Tablet 3    Barberry-Oreg Grape-Goldenseal (BERBERINE COMPLEX PO) Take  by mouth.      spironolactone/hctz (ALDACTAZIDE) 25-25 MG Tab TAKE ONE-HALF TABLET BY  MOUTH DAILY 45 Tablet 3    ciclopirox (PENLAC) 8 % solution APPLY TO NAILS NIGHTLY FOR UP TO 48 WEEKS      Doxepin HCl 3 MG Tab Take 3-6 mg by mouth.      L-Lysine 500 MG Tab Take 500 mg by mouth every day.      tretinoin (RETIN-A) 0.025 % cream APPLY TO FACE NIGHTLY      docosahexanoic acid (OMEGA 3 FA) 1000 MG CAPS Take 1,000 mg by mouth every day.       No current facility-administered medications for this visit.       Physical Exam:  Vitals:    05/05/23 1037   BP: (!) 146/94   BP Location: Left arm   Patient Position: Sitting   BP Cuff Size: Adult   Pulse: 71   Resp: 12   SpO2: 96%   Weight: 78 kg (172 lb)   Height: 1.702 m (5' 7\")     General appearance: NAD, conversant   Eyes: anicteric sclerae, moist conjunctivae; no lid-lag; PERRLA  HENT: Atraumatic; oropharynx clear with moist mucous membranes and no mucosal ulcerations; normal hard and soft palate  Neck: Trachea midline; FROM, supple, no thyromegaly or lymphadenopathy  Lungs: CTA, with normal respiratory effort and no intercostal retractions  CV: RRR, no MRGs, no JVD   Abdomen: Soft, non-tender; no masses or HSM  Extremities: No peripheral edema " or extremity lymphadenopathy  Skin: Normal temperature, turgor and texture; no rash, ulcers or subcutaneous nodules  Psych: Appropriate affect, alert and oriented to person, place and time    Data:  Lipids:   No results found for: CHOLSTRLTOT, TRIGLYCERIDE, HDL, LDL     BMP:  No results found for: SODIUM, POTASSIUM, CHLORIDE, CO2, GLUCOSE, BUN, CREATININE, CALCIUM, ANION     TSH:   Lab Results   Component Value Date/Time    TSHULTRASEN 1.016 08/12/2005 1115        THYROXINE (T4):   No results found for: FREEDIR     CBC: No results found for: WBC, RBC, HEMOGLOBIN, HEMATOCRIT, MCV, MCH, MCHC, RDW, PLATELETCT, MPV, NEUTSPOLYS, LYMPHOCYTES, MONOCYTES, EOSINOPHILS, BASOPHILS, IMMGRAN, NRBC, NEUTS, LYMPHS, MONOS, EOS, BASO, IMMGRANAB, NRBCAB     CBC w/o DIFF  No results found for: WBC, RBC, HEMOGLOBIN, MCV, MCH, MCHC, RDW, MPV    Prior echo/stress results reviewed: Normal EF    EKG interpreted by me: NSR    Impression/Plan:  1. Paroxysmal atrial fibrillation (HCC)  EKG        pAF with RVR  HTN  History of TIA  Hypercoagulable state due to afib    - Discussed rhythm control of Afib with AAD vs PVI. Discussed OAC vs MARLYN-C. Pt would prefer interventional options and avoid long term medications.  - We discussed pulmonary vein isolation for therapeutic management and continued rhythm control.  We discussed the risks and benefits of this procedure.  Risks include 1-3% risk of major cardiovascular event including stroke, myocardial infarction, phrenic nerve damage, esophageal injury and/or fistula formation, cardiac perforation, pericardial effusion, tamponade, major bleeding, or death.  I quoted a 70 to 80% chance free of atrial fibrillation at 12 months.  We discussed that he may also need a second procedure.  - We discussed MARLYN-C as well with similar risk profile.    After discussion we will plan to continue Eliquis for now and plan Watchman next month, and will do PVI during the 6 week post-Watchman interval so as to allow  minimal further time needed on Eliquis.     Ranjith Garcia MD  Cardiac Electrophysiology

## 2023-05-05 NOTE — TELEPHONE ENCOUNTER
Needs appt for MCV and TDAP   Please look into this today and give mom a call back at [phone]  Thank You Patient scheduled for watchman w/ELIANE on 6-13-23 with Dr. Garcia. Patient has been instructed to check in at 10:30 for 12:30 case time. Message sent to authorizations. [employer]de[facility] rep aware of this date.

## 2023-05-05 NOTE — TELEPHONE ENCOUNTER
Patient scheduled for post watchman ELIANE on 7-25-23 with Dr. Reese. Patient has been instructed to check in at 8:00 for 1000 case time. Message sent to jeison MAYER to Dr. Reese

## 2023-05-05 NOTE — TELEPHONE ENCOUNTER
----- Message from Ranjith Garcia M.D. sent at 5/5/2023 12:04 PM PDT -----  Can we schedule Watchman in June, and AF ablation ~2 weeks later prior to the end of June? No meds to hold for either procedure. Thanks

## 2023-05-10 ENCOUNTER — APPOINTMENT (OUTPATIENT)
Dept: ADMISSIONS | Facility: MEDICAL CENTER | Age: 63
DRG: 274 | End: 2023-05-10
Attending: INTERNAL MEDICINE
Payer: MEDICARE

## 2023-05-12 ENCOUNTER — TELEPHONE (OUTPATIENT)
Dept: CARDIOLOGY | Facility: MEDICAL CENTER | Age: 63
End: 2023-05-12
Payer: MEDICARE

## 2023-05-12 NOTE — TELEPHONE ENCOUNTER
Per Jessie, decision aid faxed to Dr. Pond. Confirmation report received and scanned into [employer].

## 2023-05-30 ENCOUNTER — PRE-ADMISSION TESTING (OUTPATIENT)
Dept: ADMISSIONS | Facility: MEDICAL CENTER | Age: 63
DRG: 274 | End: 2023-05-30
Attending: INTERNAL MEDICINE
Payer: MEDICARE

## 2023-05-30 VITALS — WEIGHT: 170 LBS | HEIGHT: 67 IN | BODY MASS INDEX: 26.68 KG/M2

## 2023-06-08 ENCOUNTER — PRE-ADMISSION TESTING (OUTPATIENT)
Dept: ADMISSIONS | Facility: MEDICAL CENTER | Age: 63
DRG: 274 | End: 2023-06-08
Attending: INTERNAL MEDICINE
Payer: MEDICARE

## 2023-06-08 DIAGNOSIS — Z01.810 PRE-OPERATIVE CARDIOVASCULAR EXAMINATION: ICD-10-CM

## 2023-06-08 DIAGNOSIS — Z01.812 PRE-OPERATIVE LABORATORY EXAMINATION: ICD-10-CM

## 2023-06-08 LAB
ANION GAP SERPL CALC-SCNC: 15 MMOL/L (ref 7–16)
APTT PPP: 29.6 SEC (ref 24.7–36)
BASOPHILS # BLD AUTO: 0.4 % (ref 0–1.8)
BASOPHILS # BLD: 0.02 K/UL (ref 0–0.12)
BUN SERPL-MCNC: 15 MG/DL (ref 8–22)
CALCIUM SERPL-MCNC: 10 MG/DL (ref 8.5–10.5)
CHLORIDE SERPL-SCNC: 101 MMOL/L (ref 96–112)
CO2 SERPL-SCNC: 23 MMOL/L (ref 20–33)
CREAT SERPL-MCNC: 0.76 MG/DL (ref 0.5–1.4)
EKG IMPRESSION: NORMAL
EOSINOPHIL # BLD AUTO: 0.07 K/UL (ref 0–0.51)
EOSINOPHIL NFR BLD: 1.5 % (ref 0–6.9)
ERYTHROCYTE [DISTWIDTH] IN BLOOD BY AUTOMATED COUNT: 42.8 FL (ref 35.9–50)
GFR SERPLBLD CREATININE-BSD FMLA CKD-EPI: 87 ML/MIN/1.73 M 2
GLUCOSE SERPL-MCNC: 112 MG/DL (ref 65–99)
HCT VFR BLD AUTO: 42.7 % (ref 37–47)
HGB BLD-MCNC: 14.4 G/DL (ref 12–16)
IMM GRANULOCYTES # BLD AUTO: 0.02 K/UL (ref 0–0.11)
IMM GRANULOCYTES NFR BLD AUTO: 0.4 % (ref 0–0.9)
INR PPP: 1.06 (ref 0.87–1.13)
LYMPHOCYTES # BLD AUTO: 0.98 K/UL (ref 1–4.8)
LYMPHOCYTES NFR BLD: 20.7 % (ref 22–41)
MCH RBC QN AUTO: 32.4 PG (ref 27–33)
MCHC RBC AUTO-ENTMCNC: 33.7 G/DL (ref 32.2–35.5)
MCV RBC AUTO: 96.2 FL (ref 81.4–97.8)
MONOCYTES # BLD AUTO: 0.46 K/UL (ref 0–0.85)
MONOCYTES NFR BLD AUTO: 9.7 % (ref 0–13.4)
NEUTROPHILS # BLD AUTO: 3.18 K/UL (ref 1.82–7.42)
NEUTROPHILS NFR BLD: 67.3 % (ref 44–72)
NRBC # BLD AUTO: 0 K/UL
NRBC BLD-RTO: 0 /100 WBC (ref 0–0.2)
PLATELET # BLD AUTO: 229 K/UL (ref 164–446)
PMV BLD AUTO: 9.1 FL (ref 9–12.9)
POTASSIUM SERPL-SCNC: 4.4 MMOL/L (ref 3.6–5.5)
PROTHROMBIN TIME: 13.7 SEC (ref 12–14.6)
RBC # BLD AUTO: 4.44 M/UL (ref 4.2–5.4)
SODIUM SERPL-SCNC: 139 MMOL/L (ref 135–145)
WBC # BLD AUTO: 4.7 K/UL (ref 4.8–10.8)

## 2023-06-08 PROCEDURE — 36415 COLL VENOUS BLD VENIPUNCTURE: CPT

## 2023-06-08 PROCEDURE — 85025 COMPLETE CBC W/AUTO DIFF WBC: CPT

## 2023-06-08 PROCEDURE — 80048 BASIC METABOLIC PNL TOTAL CA: CPT

## 2023-06-08 PROCEDURE — 93005 ELECTROCARDIOGRAM TRACING: CPT

## 2023-06-08 PROCEDURE — 93010 ELECTROCARDIOGRAM REPORT: CPT | Performed by: INTERNAL MEDICINE

## 2023-06-08 PROCEDURE — 85610 PROTHROMBIN TIME: CPT

## 2023-06-08 PROCEDURE — 85730 THROMBOPLASTIN TIME PARTIAL: CPT

## 2023-06-13 ENCOUNTER — APPOINTMENT (OUTPATIENT)
Dept: CARDIOLOGY | Facility: MEDICAL CENTER | Age: 63
DRG: 274 | End: 2023-06-13
Attending: INTERNAL MEDICINE
Payer: MEDICARE

## 2023-06-13 ENCOUNTER — HOSPITAL ENCOUNTER (INPATIENT)
Facility: MEDICAL CENTER | Age: 63
LOS: 1 days | DRG: 274 | End: 2023-06-14
Attending: INTERNAL MEDICINE | Admitting: INTERNAL MEDICINE
Payer: MEDICARE

## 2023-06-13 ENCOUNTER — ANESTHESIA EVENT (OUTPATIENT)
Dept: CARDIOLOGY | Facility: MEDICAL CENTER | Age: 63
DRG: 274 | End: 2023-06-13
Payer: MEDICARE

## 2023-06-13 ENCOUNTER — ANESTHESIA (OUTPATIENT)
Dept: CARDIOLOGY | Facility: MEDICAL CENTER | Age: 63
DRG: 274 | End: 2023-06-13
Payer: MEDICARE

## 2023-06-13 DIAGNOSIS — I48.0 PAROXYSMAL ATRIAL FIBRILLATION (HCC): ICD-10-CM

## 2023-06-13 PROBLEM — I48.91 ATRIAL FIBRILLATION (HCC): Status: ACTIVE | Noted: 2023-06-13

## 2023-06-13 LAB
ACT BLD: 438 SEC (ref 74–137)
EKG IMPRESSION: NORMAL
LV EJECT FRACT  99904: 65

## 2023-06-13 PROCEDURE — C1894 INTRO/SHEATH, NON-LASER: HCPCS

## 2023-06-13 PROCEDURE — 700102 HCHG RX REV CODE 250 W/ 637 OVERRIDE(OP): Performed by: INTERNAL MEDICINE

## 2023-06-13 PROCEDURE — 93005 ELECTROCARDIOGRAM TRACING: CPT | Performed by: INTERNAL MEDICINE

## 2023-06-13 PROCEDURE — 85347 COAGULATION TIME ACTIVATED: CPT

## 2023-06-13 PROCEDURE — 700101 HCHG RX REV CODE 250

## 2023-06-13 PROCEDURE — 700111 HCHG RX REV CODE 636 W/ 250 OVERRIDE (IP): Performed by: STUDENT IN AN ORGANIZED HEALTH CARE EDUCATION/TRAINING PROGRAM

## 2023-06-13 PROCEDURE — 160002 HCHG RECOVERY MINUTES (STAT)

## 2023-06-13 PROCEDURE — 700101 HCHG RX REV CODE 250: Performed by: STUDENT IN AN ORGANIZED HEALTH CARE EDUCATION/TRAINING PROGRAM

## 2023-06-13 PROCEDURE — 93312 ECHO TRANSESOPHAGEAL: CPT | Mod: 26,59 | Performed by: STUDENT IN AN ORGANIZED HEALTH CARE EDUCATION/TRAINING PROGRAM

## 2023-06-13 PROCEDURE — 33340 PERQ CLSR TCAT L ATR APNDGE: CPT | Mod: Q0 | Performed by: INTERNAL MEDICINE

## 2023-06-13 PROCEDURE — 01926 ANES IVNTL RAD ICR ICAR/AORT: CPT | Performed by: STUDENT IN AN ORGANIZED HEALTH CARE EDUCATION/TRAINING PROGRAM

## 2023-06-13 PROCEDURE — 700111 HCHG RX REV CODE 636 W/ 250 OVERRIDE (IP)

## 2023-06-13 PROCEDURE — 770020 HCHG ROOM/CARE - TELE (206)

## 2023-06-13 PROCEDURE — A9270 NON-COVERED ITEM OR SERVICE: HCPCS | Performed by: INTERNAL MEDICINE

## 2023-06-13 PROCEDURE — 700117 HCHG RX CONTRAST REV CODE 255: Performed by: INTERNAL MEDICINE

## 2023-06-13 PROCEDURE — 160035 HCHG PACU - 1ST 60 MINS PHASE I

## 2023-06-13 PROCEDURE — 700105 HCHG RX REV CODE 258: Performed by: INTERNAL MEDICINE

## 2023-06-13 PROCEDURE — 02L73DK OCCLUSION OF LEFT ATRIAL APPENDAGE WITH INTRALUMINAL DEVICE, PERCUTANEOUS APPROACH: ICD-10-PCS | Performed by: INTERNAL MEDICINE

## 2023-06-13 PROCEDURE — 93010 ELECTROCARDIOGRAM REPORT: CPT | Mod: Q1 | Performed by: INTERNAL MEDICINE

## 2023-06-13 PROCEDURE — 93319 3D ECHO IMG CGEN CAR ANOMAL: CPT

## 2023-06-13 RX ORDER — ASPIRIN 81 MG/1
81 TABLET, CHEWABLE ORAL DAILY
Status: DISCONTINUED | OUTPATIENT
Start: 2023-06-13 | End: 2023-06-14 | Stop reason: HOSPADM

## 2023-06-13 RX ORDER — ONDANSETRON 2 MG/ML
4 INJECTION INTRAMUSCULAR; INTRAVENOUS
Status: DISCONTINUED | OUTPATIENT
Start: 2023-06-13 | End: 2023-06-13 | Stop reason: HOSPADM

## 2023-06-13 RX ORDER — HYDRALAZINE HYDROCHLORIDE 20 MG/ML
5 INJECTION INTRAMUSCULAR; INTRAVENOUS
Status: DISCONTINUED | OUTPATIENT
Start: 2023-06-13 | End: 2023-06-13 | Stop reason: HOSPADM

## 2023-06-13 RX ORDER — CEFAZOLIN SODIUM 1 G/3ML
INJECTION, POWDER, FOR SOLUTION INTRAMUSCULAR; INTRAVENOUS PRN
Status: DISCONTINUED | OUTPATIENT
Start: 2023-06-13 | End: 2023-06-13 | Stop reason: SURG

## 2023-06-13 RX ORDER — OXYCODONE HCL 5 MG/5 ML
10 SOLUTION, ORAL ORAL
Status: DISCONTINUED | OUTPATIENT
Start: 2023-06-13 | End: 2023-06-13 | Stop reason: HOSPADM

## 2023-06-13 RX ORDER — BUPIVACAINE HYDROCHLORIDE 5 MG/ML
INJECTION, SOLUTION EPIDURAL; INTRACAUDAL
Status: COMPLETED
Start: 2023-06-13 | End: 2023-06-13

## 2023-06-13 RX ORDER — SODIUM CHLORIDE, SODIUM LACTATE, POTASSIUM CHLORIDE, CALCIUM CHLORIDE 600; 310; 30; 20 MG/100ML; MG/100ML; MG/100ML; MG/100ML
INJECTION, SOLUTION INTRAVENOUS CONTINUOUS
Status: ACTIVE | OUTPATIENT
Start: 2023-06-13 | End: 2023-06-13

## 2023-06-13 RX ORDER — DEXAMETHASONE SODIUM PHOSPHATE 4 MG/ML
INJECTION, SOLUTION INTRA-ARTICULAR; INTRALESIONAL; INTRAMUSCULAR; INTRAVENOUS; SOFT TISSUE PRN
Status: DISCONTINUED | OUTPATIENT
Start: 2023-06-13 | End: 2023-06-13 | Stop reason: SURG

## 2023-06-13 RX ORDER — HALOPERIDOL 5 MG/ML
1 INJECTION INTRAMUSCULAR
Status: DISCONTINUED | OUTPATIENT
Start: 2023-06-13 | End: 2023-06-13 | Stop reason: HOSPADM

## 2023-06-13 RX ORDER — LIDOCAINE HYDROCHLORIDE 40 MG/ML
SOLUTION TOPICAL PRN
Status: DISCONTINUED | OUTPATIENT
Start: 2023-06-13 | End: 2023-06-13 | Stop reason: SURG

## 2023-06-13 RX ORDER — ACETAMINOPHEN 325 MG/1
650 TABLET ORAL EVERY 6 HOURS PRN
Status: DISCONTINUED | OUTPATIENT
Start: 2023-06-13 | End: 2023-06-14 | Stop reason: HOSPADM

## 2023-06-13 RX ORDER — TERAZOSIN 1 MG/1
1 CAPSULE ORAL DAILY
Status: DISCONTINUED | OUTPATIENT
Start: 2023-06-14 | End: 2023-06-14 | Stop reason: HOSPADM

## 2023-06-13 RX ORDER — OXYCODONE HCL 5 MG/5 ML
5 SOLUTION, ORAL ORAL
Status: DISCONTINUED | OUTPATIENT
Start: 2023-06-13 | End: 2023-06-13 | Stop reason: HOSPADM

## 2023-06-13 RX ORDER — HEPARIN SODIUM 1000 [USP'U]/ML
INJECTION, SOLUTION INTRAVENOUS; SUBCUTANEOUS
Status: COMPLETED
Start: 2023-06-13 | End: 2023-06-13

## 2023-06-13 RX ORDER — LIDOCAINE HYDROCHLORIDE 40 MG/ML
SOLUTION TOPICAL
Status: COMPLETED
Start: 2023-06-13 | End: 2023-06-13

## 2023-06-13 RX ORDER — DIPHENHYDRAMINE HYDROCHLORIDE 50 MG/ML
12.5 INJECTION INTRAMUSCULAR; INTRAVENOUS
Status: DISCONTINUED | OUTPATIENT
Start: 2023-06-13 | End: 2023-06-13 | Stop reason: HOSPADM

## 2023-06-13 RX ORDER — ONDANSETRON 2 MG/ML
INJECTION INTRAMUSCULAR; INTRAVENOUS PRN
Status: DISCONTINUED | OUTPATIENT
Start: 2023-06-13 | End: 2023-06-13 | Stop reason: SURG

## 2023-06-13 RX ORDER — PROTAMINE SULFATE 10 MG/ML
INJECTION, SOLUTION INTRAVENOUS
Status: COMPLETED
Start: 2023-06-13 | End: 2023-06-13

## 2023-06-13 RX ORDER — SPIRONOLACTONE AND HYDROCHLOROTHIAZIDE 25; 25 MG/1; MG/1
0.5 TABLET ORAL DAILY
Status: DISCONTINUED | OUTPATIENT
Start: 2023-06-14 | End: 2023-06-14 | Stop reason: HOSPADM

## 2023-06-13 RX ORDER — LIDOCAINE HYDROCHLORIDE 20 MG/ML
INJECTION, SOLUTION EPIDURAL; INFILTRATION; INTRACAUDAL; PERINEURAL PRN
Status: DISCONTINUED | OUTPATIENT
Start: 2023-06-13 | End: 2023-06-13 | Stop reason: SURG

## 2023-06-13 RX ORDER — ATENOLOL 25 MG/1
50 TABLET ORAL DAILY
Status: DISCONTINUED | OUTPATIENT
Start: 2023-06-14 | End: 2023-06-14 | Stop reason: HOSPADM

## 2023-06-13 RX ORDER — SODIUM CHLORIDE, SODIUM LACTATE, POTASSIUM CHLORIDE, CALCIUM CHLORIDE 600; 310; 30; 20 MG/100ML; MG/100ML; MG/100ML; MG/100ML
INJECTION, SOLUTION INTRAVENOUS CONTINUOUS
Status: DISCONTINUED | OUTPATIENT
Start: 2023-06-13 | End: 2023-06-13 | Stop reason: HOSPADM

## 2023-06-13 RX ORDER — LOSARTAN POTASSIUM 25 MG/1
25 TABLET ORAL DAILY
Status: DISCONTINUED | OUTPATIENT
Start: 2023-06-14 | End: 2023-06-14 | Stop reason: HOSPADM

## 2023-06-13 RX ORDER — AMLODIPINE BESYLATE 2.5 MG/1
2.5 TABLET ORAL DAILY
Status: DISCONTINUED | OUTPATIENT
Start: 2023-06-14 | End: 2023-06-14 | Stop reason: HOSPADM

## 2023-06-13 RX ORDER — EPHEDRINE SULFATE 50 MG/ML
5 INJECTION, SOLUTION INTRAVENOUS
Status: DISCONTINUED | OUTPATIENT
Start: 2023-06-13 | End: 2023-06-13 | Stop reason: HOSPADM

## 2023-06-13 RX ORDER — HEPARIN SODIUM 200 [USP'U]/100ML
INJECTION, SOLUTION INTRAVENOUS
Status: COMPLETED
Start: 2023-06-13 | End: 2023-06-13

## 2023-06-13 RX ORDER — LIDOCAINE HYDROCHLORIDE 20 MG/ML
INJECTION, SOLUTION INFILTRATION; PERINEURAL
Status: COMPLETED
Start: 2023-06-13 | End: 2023-06-13

## 2023-06-13 RX ADMIN — HEPARIN SODIUM 10000 UNITS: 1000 INJECTION, SOLUTION INTRAVENOUS; SUBCUTANEOUS at 13:06

## 2023-06-13 RX ADMIN — HEPARIN SODIUM 4000 UNITS: 200 INJECTION, SOLUTION INTRAVENOUS at 12:41

## 2023-06-13 RX ADMIN — SUGAMMADEX 200 MG: 100 INJECTION, SOLUTION INTRAVENOUS at 13:33

## 2023-06-13 RX ADMIN — ASPIRIN 81 MG 81 MG: 81 TABLET ORAL at 17:38

## 2023-06-13 RX ADMIN — PROPOFOL 100 MG: 10 INJECTION, EMULSION INTRAVENOUS at 12:45

## 2023-06-13 RX ADMIN — SODIUM CHLORIDE, POTASSIUM CHLORIDE, SODIUM LACTATE AND CALCIUM CHLORIDE: 600; 310; 30; 20 INJECTION, SOLUTION INTRAVENOUS at 11:01

## 2023-06-13 RX ADMIN — ACETAMINOPHEN 650 MG: 325 TABLET, FILM COATED ORAL at 22:05

## 2023-06-13 RX ADMIN — FENTANYL CITRATE 100 MCG: 50 INJECTION, SOLUTION INTRAMUSCULAR; INTRAVENOUS at 12:45

## 2023-06-13 RX ADMIN — APIXABAN 5 MG: 5 TABLET, FILM COATED ORAL at 17:38

## 2023-06-13 RX ADMIN — ROCURONIUM BROMIDE 50 MG: 10 INJECTION, SOLUTION INTRAVENOUS at 12:46

## 2023-06-13 RX ADMIN — IOHEXOL 61 ML: 350 INJECTION, SOLUTION INTRAVENOUS at 13:26

## 2023-06-13 RX ADMIN — HEPARIN SODIUM 2000 UNITS: 1000 INJECTION, SOLUTION INTRAVENOUS; SUBCUTANEOUS at 13:06

## 2023-06-13 RX ADMIN — PROTAMINE SULFATE 40 MG: 10 INJECTION, SOLUTION INTRAVENOUS at 13:32

## 2023-06-13 RX ADMIN — BUPIVACAINE HYDROCHLORIDE: 5 INJECTION, SOLUTION EPIDURAL; INTRACAUDAL at 12:42

## 2023-06-13 RX ADMIN — DEXAMETHASONE SODIUM PHOSPHATE 4 MG: 4 INJECTION INTRA-ARTICULAR; INTRALESIONAL; INTRAMUSCULAR; INTRAVENOUS; SOFT TISSUE at 12:50

## 2023-06-13 RX ADMIN — ONDANSETRON 4 MG: 2 INJECTION INTRAMUSCULAR; INTRAVENOUS at 12:50

## 2023-06-13 RX ADMIN — LIDOCAINE HYDROCHLORIDE: 20 INJECTION, SOLUTION INFILTRATION; PERINEURAL at 12:42

## 2023-06-13 RX ADMIN — LIDOCAINE HYDROCHLORIDE 4 ML: 40 SOLUTION TOPICAL at 12:46

## 2023-06-13 RX ADMIN — CEFAZOLIN 2 G: 1 INJECTION, POWDER, FOR SOLUTION INTRAMUSCULAR; INTRAVENOUS at 12:45

## 2023-06-13 RX ADMIN — LIDOCAINE HYDROCHLORIDE 80 MG: 20 INJECTION, SOLUTION EPIDURAL; INFILTRATION; INTRACAUDAL at 12:45

## 2023-06-13 ASSESSMENT — LIFESTYLE VARIABLES
TOTAL SCORE: 0
HAVE YOU EVER FELT YOU SHOULD CUT DOWN ON YOUR DRINKING: NO
CONSUMPTION TOTAL: NEGATIVE
TOTAL SCORE: 0
TOTAL SCORE: 0
ALCOHOL_USE: YES
EVER FELT BAD OR GUILTY ABOUT YOUR DRINKING: NO
HOW MANY TIMES IN THE PAST YEAR HAVE YOU HAD 5 OR MORE DRINKS IN A DAY: 0
ON A TYPICAL DAY WHEN YOU DRINK ALCOHOL HOW MANY DRINKS DO YOU HAVE: 2
AVERAGE NUMBER OF DAYS PER WEEK YOU HAVE A DRINK CONTAINING ALCOHOL: 2
HAVE PEOPLE ANNOYED YOU BY CRITICIZING YOUR DRINKING: NO
DOES PATIENT WANT TO STOP DRINKING: NO
EVER HAD A DRINK FIRST THING IN THE MORNING TO STEADY YOUR NERVES TO GET RID OF A HANGOVER: NO

## 2023-06-13 ASSESSMENT — CHA2DS2 SCORE
HYPERTENSION: YES
DIABETES: NO
PRIOR STROKE OR TIA OR THROMBOEMBOLISM: YES
SEX: FEMALE
AGE 65 TO 74: YES
CHF OR LEFT VENTRICULAR DYSFUNCTION: NO
VASCULAR DISEASE: NO
CHA2DS2 VASC SCORE: 5
AGE 75 OR GREATER: NO

## 2023-06-13 ASSESSMENT — COGNITIVE AND FUNCTIONAL STATUS - GENERAL
MOBILITY SCORE: 24
DAILY ACTIVITIY SCORE: 24
SUGGESTED CMS G CODE MODIFIER DAILY ACTIVITY: CH
SUGGESTED CMS G CODE MODIFIER MOBILITY: CH

## 2023-06-13 ASSESSMENT — PATIENT HEALTH QUESTIONNAIRE - PHQ9
1. LITTLE INTEREST OR PLEASURE IN DOING THINGS: NOT AT ALL
SUM OF ALL RESPONSES TO PHQ9 QUESTIONS 1 AND 2: 0
2. FEELING DOWN, DEPRESSED, IRRITABLE, OR HOPELESS: NOT AT ALL

## 2023-06-13 ASSESSMENT — PAIN DESCRIPTION - PAIN TYPE
TYPE: ACUTE PAIN

## 2023-06-13 ASSESSMENT — PAIN SCALES - GENERAL: PAIN_LEVEL: 0

## 2023-06-13 NOTE — ANESTHESIA PROCEDURE NOTES
ELIANE    Date/Time: 6/13/2023 12:51 PM    Performed by: Simin Olguin M.D.  Authorized by: Simin Olguin M.D.    Start Time:6/13/2023 12:51 PM  Preanesthetic Checklist: patient identified, IV checked, site marked, risks and benefits discussed, surgical consent, monitors and equipment checked, pre-op evaluation and timeout performed    Indication for ELIANE: diagnostic   Patient Location: OR  Intubated: Yes  Bite Block: Yes  Heart Visualized: Yes  Insertion: atraumatic    **See FULL ELIANE report in patient's chart via [facility] [employer]**

## 2023-06-13 NOTE — OR NURSING
1345: Pt arrived from OR, handoff received from anesthesiologist and RN. Patient awake, oriented x4, moving all extremities. Vitals stable. HR rate 50s, appears sinus rhythm on monitor. Right groin site C/D/I, area soft,no bruising noted. EKG aware of order. Right pedal pulse 1+.Four hours strict bedrest order.     1400: EKG at bedside.     1415: Patient's daughter update don status. No changes to right groin site.     1430: Patient tolerating oral intake of fluids. Continuous to deny pain and nausea.     1445: Report given to LINDSAY Dobson. Belongings retrieved from locker.     1455: Patient taken to T721/1 on monitor.

## 2023-06-13 NOTE — H&P
[facility]  Electrophysiology Pre-procedure H&P    DOS:6/13/2023    Planned Procedure: MARLYN-C    Chief complaint/Reason for Procedure: Afib    HPI: 64 y/o F with pAF for MARLYN-C      Past Medical History:   Diagnosis Date    Arrhythmia 12/23/2022    Hypertension     Stroke (HCC) 5/11/2011    TIA       History reviewed. No pertinent surgical history.    Social History     Socioeconomic History    Marital status:      Spouse name: Not on file    Number of children: Not on file    Years of education: Not on file    Highest education level: Not on file   Occupational History    Not on file   Tobacco Use    Smoking status: Never    Smokeless tobacco: Never   Vaping Use    Vaping Use: Never used   Substance and Sexual Activity    Alcohol use: Yes     Alcohol/week: 3.5 oz     Types: 7 Glasses of wine per week     Comment: 2 per day    Drug use: No    Sexual activity: Not on file   Other Topics Concern    Not on file   Social History Narrative    Not on file     Social Determinants of Health     Financial Resource Strain: Not on file   Food Insecurity: Not on file   Transportation Needs: Not on file   Physical Activity: Not on file   Stress: Not on file   Social Connections: Not on file   Intimate Partner Violence: Not on file   Housing Stability: Not on file       History reviewed. No pertinent family history.    Allergies   Allergen Reactions    Atorvastatin     Codeine Vomiting    Morphine Vomiting    Plavix [Clopidogrel Bisulfate] Hives    Warfarin Hives       Current Facility-Administered Medications   Medication Dose Route Frequency Provider Last Rate Last Admin    lidocaine (XYLOCAINE) 1 % injection 0.5 mL  0.5 mL Intradermal Once PRN Farhat Reese M.D.        lactated ringers infusion   Intravenous Continuous Farhat Reese M.D. 10 mL/hr at 06/13/23 1101 New Bag at 06/13/23 1101       Physical Exam:  Vitals:    06/13/23 1036   BP: (!) 141/77   Pulse: (!) 57   Resp: 18   Temp: 36.2 °C (97.2 °F)  "  TempSrc: Temporal   SpO2: 96%   Weight: 79.6 kg (175 lb 7.8 oz)   Height: 1.702 m (5' 7\")     General appearance: NAD, conversant   Neck: Trachea midline; FROM, supple, no thyromegaly or lymphadenopathy  CV: RRR, no MRGs, no JVD   Extremities: No peripheral edema or extremity lymphadenopathy  Skin: Normal temperature, turgor and texture; no rash, ulcers or subcutaneous nodules  Psych: Appropriate affect, alert and oriented to person, place and time    Data:  No results found for: CHOLSTRLTOT, LDL, HDL, TRIGLYCERIDE    Lab Results   Component Value Date/Time    SODIUM 139 06/08/2023 10:38 AM    POTASSIUM 4.4 06/08/2023 10:38 AM    CHLORIDE 101 06/08/2023 10:38 AM    CO2 23 06/08/2023 10:38 AM    GLUCOSE 112 (H) 06/08/2023 10:38 AM    BUN 15 06/08/2023 10:38 AM    CREATININE 0.76 06/08/2023 10:38 AM     No results found for: ALKPHOSPHAT, ASTSGOT, ALTSGPT, TBILIRUBIN   No results found for: BNPBTYPENAT            EKG interpreted by me: NSR    Impression/Plan:  1) pAF    Plan MARLYN-C. Risks include vascular access bleeding or pain, infection, stroke, myocardial infarction, cardiac tamponade, pericardial effusion, myocardial perforation, major bleeding, and death. Risk of major adverse event is ~1%.     Ranjith Garcia MD  Cardiac Electrophysiology    "

## 2023-06-13 NOTE — ANESTHESIA POSTPROCEDURE EVALUATION
Patient: Juan Luis Rolon    Procedure Summary     Date: 06/13/23 Room / Location: [facility] IMAG[facility] - CATH LAB [facility]    Anesthesia Start: 1238 Anesthesia Stop: 1346    Procedure: CL-LEFT ATRIAL APPENDAGE CLOSURE Diagnosis:       Paroxysmal atrial fibrillation (HCC)      Paroxysmal atrial fibrillation      (See Associated Dx)    Scheduled Providers: Ranjith Garcia M.D.; Simin Olguin M.D. Responsible Provider: Simin Olguin M.D.    Anesthesia Type: general ASA Status: 3          Final Anesthesia Type: general  Last vitals  BP   Blood Pressure : (!) 141/77    Temp   36.2 °C (97.2 °F)    Pulse   (!) 57   Resp   18    SpO2   96 %      Anesthesia Post Evaluation    Patient location during evaluation: PACU  Patient participation: complete - patient participated  Level of consciousness: awake  Pain score: 0    Airway patency: patent  Anesthetic complications: no  Cardiovascular status: hemodynamically stable  Respiratory status: acceptable and face mask  Hydration status: euvolemic    PONV: none          No notable events documented.     Nurse Pain Score: 0 (NPRS)

## 2023-06-13 NOTE — ANESTHESIA TIME REPORT
Anesthesia Start and Stop Event Times     Date Time Event    6/13/2023 1230 Ready for Procedure     1238 Anesthesia Start     1346 Anesthesia Stop        Responsible Staff  06/13/23    Name Role Begin End    Simin Olguin M.D. Anesth 1238 1346        Overtime Reason:  no overtime (within assigned shift)    Comments:

## 2023-06-13 NOTE — OP REPORT
"[facility] Electrophysiology/Structural Heart Procedure Note     Procedure(s) Performed:   1) Watchman MARLYN closure    Indication(s): Atrial fibrillation    Physician(s): Ranjith Garcia M.D.     Resident/Assistant(s): None     Anesthesia: General endotracheal anesthetic with Dr. Olguin     Specimen(s) Removed: None     Estimated Blood Loss:  30cc     Complications:  None     Description of Procedure:   After informed written consent, the patient was brought to the cath lab in the fasting, non-sedated state. The patient was prepped and draped in the usual sterile fashion. Femoral venous access was obtained using the modified Seldinger technique. This was done under direct US guidance to visualize the needle insertion. Images were saved to the PACS system. In the right femoral vein, an 8 Fr sheath were inserted over 0.035” guidewire and exchanged for an 8.5 Fr [place] trans-septal sheath. ELIANE was used to identify the atrial septum, and left atrial appendage.  A [place] trans-septal needle was inserted to into the trans-septal sheath, and under ultrasound guidance a inferior/posterior trans-septal location was used to cross into the left atrium. Intravenous heparin was given to target an -300. A stiff exchange length 0.035\" wire was inserted into the left atrium/left pulmonary veins, over which we exchanged to the WATCHMAN delivery sheath. The WATCHMAN device was prepped and flushed. A pigtail catheter was advanced into the delivery sheath into the left atrium and then after counter clockwise torque maneuvered into the body of the left atrial appendage. Cine was taken to verify the location of the pigtail in the appendage and to assess for depth. The sheath was then advanced over the pigtail until sufficient depth was reached.  The pigtail was removed, and under wet to wet connection the WATCHMAN device was advanced through the delivery sheath until markers were aligned. The sheath was retracted slowly to deploy " the device. The first deployment revealed a sizeable shoulder so we recaptured and redeployed. After the device was deployed we assessed again for leak with contrast injected through the sheath as well as a tug test. We verified good position, anchoring, size, and seal with no/minimal leak with ELIANE. After all criteria were met we detached the device from the delivery system. At the end of the procedure, heparin was reversed with protamine, the catheter and sheaths were removed, and hemostasis was achieved by manual compression along with a figure of 8 silk suture. Following recovery from anesthesia, the patient was transferred to the PPU in good condition.        Fluoroscopy time:  5.0 min    Contrast used: 61 cc     Device implanted:  Size  31mm  Serial # [id number]  Max appendage pre-measurement 26 mm  Max device measurement 11-19% compression     Impressions:    1. Successful MARLYN closure      Recommendations:  1. DOAC and ASA 81 (target INR 2-2.5)   2. Admit to monitored bedrest.  3. Echo and removal of figure of 8 suture in the AM

## 2023-06-13 NOTE — ANESTHESIA PREPROCEDURE EVALUATION
Date/Time: 06/13/23 1300    Scheduled providers: Ranjith Garcia M.D.; Simin Olguin M.D.    Procedure: CL-LEFT ATRIAL APPENDAGE CLOSURE    Diagnosis:       Paroxysmal atrial fibrillation (HCC) [I48.0]      Paroxysmal atrial fibrillation [I48.0]    Indications: See Associated Dx    Location: [facility] IMAGING - CATH LAB - [facility]        62 yo woman with Afib for watchman procedure.  - HTN  - h/o TIA    NPO>8hrs. No N/V. METS>4.  Occasional EtOH use. No tobacco or other recreational drugs.     TTE 1/17/23  CONCLUSIONS  No prior study is available for comparison.   Normal left ventricular systolic function.   No evidence of valvular abnormality based on Doppler evaluation.   Right ventricular systolic pressure is estimated to be 35 mmHg.    Relevant Problems   NEURO   (positive) Transient ischemic attack      CARDIAC   (positive) Carotid artery dissection (HCC)   (positive) Carotid occlusion, left   (positive) Essential hypertension       Physical Exam    Airway   Mallampati: II  TM distance: >3 FB  Neck ROM: full       Cardiovascular - normal exam  Rhythm: regular  Rate: normal  (-) murmur     Dental - normal exam           Pulmonary - normal exam  Breath sounds clear to auscultation     Abdominal    Neurological - normal exam         Other findings: No loose teeth           Anesthesia Plan    ASA 3   ASA physical status 3 criteria: CVA or TIA - history (> 3 months)    Plan - general       Airway plan will be ETT  ELIANE Planned        Induction: intravenous    Postoperative Plan: Postoperative administration of opioids is intended.    Pertinent diagnostic labs and testing reviewed    Informed Consent:    Anesthetic plan and risks discussed with patient.    Use of blood products discussed with: patient whom consented to blood products.

## 2023-06-13 NOTE — ANESTHESIA PROCEDURE NOTES
Airway    Date/Time: 6/13/2023 12:46 PM    Performed by: Simin Olguin M.D.  Authorized by: Simin Olguin M.D.    Location:  OR  Urgency:  Elective  Indications for Airway Management:  Anesthesia      Spontaneous Ventilation: absent    Sedation Level:  Deep  Preoxygenated: Yes    Patient Position:  Sniffing  Mask Difficulty Assessment:  1 - vent by mask  Final Airway Type:  Endotracheal airway  Final Endotracheal Airway:  ETT  Cuffed: Yes    Technique Used for Successful ETT Placement:  Direct laryngoscopy    Insertion Site:  Oral  Blade Type:  Nay  Laryngoscope Blade/Videolaryngoscope Blade Size:  3  ETT Size (mm):  7.0  Measured from:  Teeth  ETT to Teeth (cm):  21  Placement Verified by: auscultation and capnometry    Cormack-Lehane Classification:  Grade I - full view of glottis  Number of Attempts at Approach:  1

## 2023-06-14 VITALS
HEIGHT: 67 IN | DIASTOLIC BLOOD PRESSURE: 76 MMHG | HEART RATE: 56 BPM | WEIGHT: 175.71 LBS | SYSTOLIC BLOOD PRESSURE: 125 MMHG | TEMPERATURE: 99 F | BODY MASS INDEX: 27.58 KG/M2 | RESPIRATION RATE: 17 BRPM | OXYGEN SATURATION: 94 %

## 2023-06-14 LAB — EKG IMPRESSION: NORMAL

## 2023-06-14 PROCEDURE — 93005 ELECTROCARDIOGRAM TRACING: CPT | Performed by: INTERNAL MEDICINE

## 2023-06-14 PROCEDURE — A9270 NON-COVERED ITEM OR SERVICE: HCPCS | Performed by: INTERNAL MEDICINE

## 2023-06-14 PROCEDURE — 700102 HCHG RX REV CODE 250 W/ 637 OVERRIDE(OP): Performed by: INTERNAL MEDICINE

## 2023-06-14 PROCEDURE — 93010 ELECTROCARDIOGRAM REPORT: CPT | Mod: Q1 | Performed by: INTERNAL MEDICINE

## 2023-06-14 RX ORDER — ASPIRIN 81 MG/1
81 TABLET, CHEWABLE ORAL DAILY
Qty: 100 TABLET | Refills: 1 | Status: SHIPPED | OUTPATIENT
Start: 2023-06-15

## 2023-06-14 RX ADMIN — AMLODIPINE BESYLATE 2.5 MG: 2.5 TABLET ORAL at 05:13

## 2023-06-14 RX ADMIN — APIXABAN 5 MG: 5 TABLET, FILM COATED ORAL at 05:13

## 2023-06-14 RX ADMIN — ASPIRIN 81 MG 81 MG: 81 TABLET ORAL at 05:13

## 2023-06-14 RX ADMIN — ACETAMINOPHEN 650 MG: 325 TABLET, FILM COATED ORAL at 04:16

## 2023-06-14 RX ADMIN — LOSARTAN POTASSIUM 25 MG: 25 TABLET, FILM COATED ORAL at 05:13

## 2023-06-14 RX ADMIN — SPIRONOLACTONE AND HYDROCHLOROTHIAZIDE 0.5 TABLET: 25; 25 TABLET ORAL at 05:13

## 2023-06-14 RX ADMIN — TERAZOSIN 1 MG: 1 CAPSULE ORAL at 05:13

## 2023-06-14 ASSESSMENT — PAIN DESCRIPTION - PAIN TYPE
TYPE: ACUTE PAIN
TYPE: ACUTE PAIN

## 2023-06-14 NOTE — DISCHARGE INSTRUCTIONS
[facility] POST WATCHMAN INSTRUCTIONS  No lifting > 10 lbs x 1 week.  No baths or hot tubs x 1 week.      May shower on 6/15/2023 and take off groin dressing and leave site uncovered.  Please try to keep Steri-strip in place and allow to come off on its own.  Continue to monitor site daily for warmth, redness, discolored drainage.    3. Please do not miss any doses of your blood thinner.      4. Please keep all follow up appointments scheduled for you.  You are scheduled for a wound check appointment in one week.  You are also scheduled for procedure follow up with EP clinic.     5. A transesophageal echocardiogram (ELIANE) will be scheduled for you  to check the healing of the Watchman.  This procedure is completed at [facility] same day procedures.  We use sedation/anesthesia for this procedure so you will need a .     6. Please walk and take deep breaths after discharge.  After discharge, if you experience severe chest pain, shortness of breath, neurological changes, high fever, severe dizziness, trouble with catheter site needs to be seen in the emergency dept.

## 2023-06-14 NOTE — CARE PLAN
The patient is Stable - Low risk of patient condition declining or worsening    Shift Goals  Clinical Goals: monitor groin site, hemodynamic stability  Patient Goals: comfort, rest  Family Goals: MARCELLO    Progress made toward(s) clinical / shift goals:  Pts groin site is c/d/I, VSS, with some complaints of pain but pt states relief with PRN medication.    Problem: Knowledge Deficit - Standard  Goal: Patient and family/care givers will demonstrate understanding of plan of care, disease process/condition, diagnostic tests and medications  Outcome: Progressing     Problem: Pain - Standard  Goal: Alleviation of pain or a reduction in pain to the patient’s comfort goal  Outcome: Progressing       Patient is not progressing towards the following goals:

## 2023-06-14 NOTE — DISCHARGE SUMMARY
Discharge Summary    Reason for Admission  Paroxysmal atrial fibrillation     Admission Date  6/13/2023    CODE STATUS  Full Code    HPI & HOSPITAL COURSE      CHIEF COMPLAINT ON ADMISSION  Elective admission for WATCHMAN procedure    CODE STATUS  Full Code    HPI & HOSPITAL COURSE  CHIEF COMPLAINT ON ADMISSION  Elective admission for WATCHMAN procedure     CODE STATUS  Full Code     HPI & HOSPITAL COURSE  Sumeet Rolon is a very pleasant 63-year-old female patient of Dr. Ranjith Garcia electively admitted for left atrial appendage closure due to patient's wishes to avoid long-term anticoagulation use.  Patient underwent successful MARLYN closure via watchman with Dr. Garcia on 6/13/2023.     Patient has been seen and examined in EP rounds this AM.  His monitored rhythm is NSR.  EKG and vital signs have been reviewed and are unremarkable. Patient denies any chest pain, dyspnea, dizziness, paraesthesias, or other complaints. Physical exam is without acute abnormality; specifically his right femoral access site is clean and dry.  The figure eight suture was removed without difficulty or evidence of hematoma or bleeding.      Post WATCHMEN discharge instructions were reviewed with patient, all questions were answered.      Therefore, patient is discharged in good and stable condition with close outpatient follow-up as listed below.      PROCEDURES  NORAH procedure, Dr. Garcia, 6/14/2023.  Please see full procedure note for details.      CONSULTATIONS  None      FOLLOW UP  Future Appointments   Date Time Provider Department Center   6/20/2023  1:05 PM [facility] TEST ONLY 1 [facility] None   6/23/2023 12:30 PM [facility] ELIANE CATH LAB PORTABLE ECHO [facility]   6/23/2023  1:00 PM [facility] CATH LAB 3 CLOT None   7/25/2023 10:00 AM V EXAM 3 ELIANE [facility]   8/2/2023 12:45 PM EVANGEL[facility] None   9/27/2023  1:20 PM VASCULAR NURSE PRACTITIONER [facility] None         MEDICATIONS ON DISCHARGE     Medication List         START taking these medications        Instructions   aspirin 81 MG Chew chewable tablet  Start taking on: Heather 15, 2023  Commonly known as: ASA   Chew 1 Tablet every day.  Dose: 81 mg            CHANGE how you take these medications        Instructions   terazosin 1 MG Caps  What changed: when to take this  Commonly known as: HYTRIN   Doctor's comments: Requesting 1 year supply  TAKE 1 CAPSULE BY MOUTH  EVERY NIGHT AT BEDTIME            CONTINUE taking these medications        Instructions   amLODIPine 2.5 MG Tabs  Commonly known as: NORVASC   Doctor's comments: Requesting 1 year supply  TAKE 1 TABLET BY MOUTH  DAILY     apixaban 5mg Tabs  Commonly known as: ELIQUIS   Take 1 Tablet by mouth 2 times a day.  Dose: 5 mg     atenolol 50 MG Tabs  Commonly known as: TENORMIN   Doctor's comments: Requesting 1 year supply  TAKE 1 TABLET BY MOUTH  DAILY     ciclopirox 8 % solution  Commonly known as: PENLAC   APPLY TO NAILS NIGHTLY FOR UP TO 48 WEEKS     docosahexanoic acid 1000 MG Caps  Commonly known as: OMEGA 3 FA   Take 1,000 mg by mouth every day.  Dose: 1,000 mg     Doxepin HCl 3 MG Tabs   Take 3-6 mg by mouth at bedtime as needed. Indications: Trouble Sleeping  Dose: 3-6 mg     L-Lysine 500 MG Tabs   Take 500 mg by mouth every day.  Dose: 500 mg     losartan 25 MG Tabs  Commonly known as: COZAAR   Doctor's comments: Requesting 1 year supply  TAKE 1 TABLET BY MOUTH  DAILY     spironolactone/hctz 25-25 MG Tabs  Commonly known as: ALDACTAZIDE   Doctor's comments: Requesting 1 year supply  TAKE ONE-HALF TABLET BY  MOUTH DAILY     tretinoin 0.025 % cream  Commonly known as: RETIN-A   Apply 1 Application topically every evening.  Dose: 1 Application            [facility] POST WATCHMAN INSTRUCTIONS  No lifting > 10 lbs x 1 week.  No baths or hot tubs x 1 week.      May shower on 6/15/2023 and take off groin dressing and leave site uncovered.  Please try to keep Steri-strip in place and allow to come off on its own.  Continue  to monitor site daily for warmth, redness, discolored drainage.    3. Please do not miss any doses of your blood thinner.      4. Please keep all follow up appointments scheduled for you.  You are scheduled for a wound check appointment in one week.  You are also scheduled for procedure follow up with EP clinic.     5. A transesophageal echocardiogram (ELIANE) will be scheduled for you  to check the healing of the Watchman.  This procedure is completed at [facility] same day procedures.  We use sedation/anesthesia for this procedure so you will need a .     6. Please walk and take deep breaths after discharge.  After discharge, if you experience severe chest pain, shortness of breath, neurological changes, high fever, severe dizziness, trouble with catheter site needs to be seen in the emergency dept.

## 2023-06-14 NOTE — PROGRESS NOTES
4 Eyes Skin Assessment Completed by LINDSAY Dobson and LINDSAY Khoury.    Head WDL  Ears WDL  Nose WDL  Mouth WDL  Neck WDL  Breast/Chest WDL  Shoulder Blades WDL  Spine WDL  (R) Arm/Elbow/Hand WDL  (L) Arm/Elbow/Hand WDL  Abdomen WDL  Groin WDL  Scrotum/Coccyx/Buttocks WDL  (R) Leg WDL  (L) Leg WDL  (R) Heel/Foot/Toe WDL  (L) Heel/Foot/Toe WDL          Devices In Places Tele Box      Interventions In Place Pillows    Possible Skin Injury No    Pictures Uploaded Into Epic N/A  Wound Consult Placed N/A  RN Wound Prevention Protocol Ordered No

## 2023-06-19 ENCOUNTER — TELEPHONE (OUTPATIENT)
Dept: CARDIOLOGY | Facility: MEDICAL CENTER | Age: 63
End: 2023-06-19
Payer: MEDICARE

## 2023-06-19 NOTE — TELEPHONE ENCOUNTER
Had chest pain yesterday on and off then last night went with ED, Dx with pericarditis went to [facility]. Prescribed her colchicine, protonix and indomethacin. Advised will get records. STAT records request sent to [facility]    Pt feeling fine now. Wondering if ok to proceed with upcoming ablation.

## 2023-06-19 NOTE — TELEPHONE ENCOUNTER
Ranjith Garcia M.D.  You 50 minutes ago (12:08 PM)       If feeling fine then OK to proceed      You  Ranjith Garcia M.D. 54 minutes ago (12:05 PM)       Awaiting records,went to ED dx pericarditis**. Wondering if ok to proceed with ablation in a few days        Reviewed with pt, she will complete pre-op labs tomorrow

## 2023-06-19 NOTE — TELEPHONE ENCOUNTER
WILY          Caller: Juan Luis Rolon      Topic/issue: Patient was calling about her surgery on Friday and if it was safe for her to attend the appointment the ablation procedure she has coming up and if she can complete the blood work before that as well as medication questions    Callback Number: [phone]      Thank you     -Wellington CALI

## 2023-06-20 ENCOUNTER — PRE-ADMISSION TESTING (OUTPATIENT)
Dept: ADMISSIONS | Facility: MEDICAL CENTER | Age: 63
End: 2023-06-20
Attending: INTERNAL MEDICINE
Payer: MEDICARE

## 2023-06-20 DIAGNOSIS — Z01.810 PRE-OPERATIVE CARDIOVASCULAR EXAMINATION: ICD-10-CM

## 2023-06-20 DIAGNOSIS — Z01.812 PRE-OPERATIVE LABORATORY EXAMINATION: ICD-10-CM

## 2023-06-20 LAB
ALBUMIN SERPL BCP-MCNC: 4.4 G/DL (ref 3.2–4.9)
ALBUMIN/GLOB SERPL: 1.6 G/DL
ALP SERPL-CCNC: 70 U/L (ref 30–99)
ALT SERPL-CCNC: 33 U/L (ref 2–50)
ANION GAP SERPL CALC-SCNC: 12 MMOL/L (ref 7–16)
AST SERPL-CCNC: 33 U/L (ref 12–45)
BASOPHILS # BLD AUTO: 0.4 % (ref 0–1.8)
BASOPHILS # BLD: 0.02 K/UL (ref 0–0.12)
BILIRUB SERPL-MCNC: 0.5 MG/DL (ref 0.1–1.5)
BUN SERPL-MCNC: 21 MG/DL (ref 8–22)
CALCIUM ALBUM COR SERPL-MCNC: 9.3 MG/DL (ref 8.5–10.5)
CALCIUM SERPL-MCNC: 9.6 MG/DL (ref 8.5–10.5)
CHLORIDE SERPL-SCNC: 101 MMOL/L (ref 96–112)
CO2 SERPL-SCNC: 26 MMOL/L (ref 20–33)
CREAT SERPL-MCNC: 0.79 MG/DL (ref 0.5–1.4)
EKG IMPRESSION: NORMAL
EOSINOPHIL # BLD AUTO: 0.1 K/UL (ref 0–0.51)
EOSINOPHIL NFR BLD: 1.9 % (ref 0–6.9)
ERYTHROCYTE [DISTWIDTH] IN BLOOD BY AUTOMATED COUNT: 42.5 FL (ref 35.9–50)
GFR SERPLBLD CREATININE-BSD FMLA CKD-EPI: 83 ML/MIN/1.73 M 2
GLOBULIN SER CALC-MCNC: 2.7 G/DL (ref 1.9–3.5)
GLUCOSE SERPL-MCNC: 107 MG/DL (ref 65–99)
HCT VFR BLD AUTO: 39.4 % (ref 37–47)
HGB BLD-MCNC: 13.6 G/DL (ref 12–16)
IMM GRANULOCYTES # BLD AUTO: 0.02 K/UL (ref 0–0.11)
IMM GRANULOCYTES NFR BLD AUTO: 0.4 % (ref 0–0.9)
INR PPP: 1.12 (ref 0.87–1.13)
LYMPHOCYTES # BLD AUTO: 1.35 K/UL (ref 1–4.8)
LYMPHOCYTES NFR BLD: 26.1 % (ref 22–41)
MCH RBC QN AUTO: 32.8 PG (ref 27–33)
MCHC RBC AUTO-ENTMCNC: 34.5 G/DL (ref 32.2–35.5)
MCV RBC AUTO: 94.9 FL (ref 81.4–97.8)
MONOCYTES # BLD AUTO: 0.7 K/UL (ref 0–0.85)
MONOCYTES NFR BLD AUTO: 13.5 % (ref 0–13.4)
NEUTROPHILS # BLD AUTO: 2.98 K/UL (ref 1.82–7.42)
NEUTROPHILS NFR BLD: 57.7 % (ref 44–72)
NRBC # BLD AUTO: 0 K/UL
NRBC BLD-RTO: 0 /100 WBC (ref 0–0.2)
PLATELET # BLD AUTO: 253 K/UL (ref 164–446)
PMV BLD AUTO: 9.5 FL (ref 9–12.9)
POTASSIUM SERPL-SCNC: 4.5 MMOL/L (ref 3.6–5.5)
PROT SERPL-MCNC: 7.1 G/DL (ref 6–8.2)
PROTHROMBIN TIME: 14.2 SEC (ref 12–14.6)
RBC # BLD AUTO: 4.15 M/UL (ref 4.2–5.4)
SODIUM SERPL-SCNC: 139 MMOL/L (ref 135–145)
WBC # BLD AUTO: 5.2 K/UL (ref 4.8–10.8)

## 2023-06-20 PROCEDURE — 85610 PROTHROMBIN TIME: CPT

## 2023-06-20 PROCEDURE — 85025 COMPLETE CBC W/AUTO DIFF WBC: CPT

## 2023-06-20 PROCEDURE — 93005 ELECTROCARDIOGRAM TRACING: CPT

## 2023-06-20 PROCEDURE — 80053 COMPREHEN METABOLIC PANEL: CPT

## 2023-06-20 PROCEDURE — 93010 ELECTROCARDIOGRAM REPORT: CPT | Performed by: INTERNAL MEDICINE

## 2023-06-20 PROCEDURE — 36415 COLL VENOUS BLD VENIPUNCTURE: CPT

## 2023-06-23 ENCOUNTER — HOSPITAL ENCOUNTER (OUTPATIENT)
Facility: MEDICAL CENTER | Age: 63
End: 2023-06-23
Attending: INTERNAL MEDICINE | Admitting: INTERNAL MEDICINE
Payer: MEDICARE

## 2023-06-23 ENCOUNTER — APPOINTMENT (OUTPATIENT)
Dept: CARDIOLOGY | Facility: MEDICAL CENTER | Age: 63
End: 2023-06-23
Attending: INTERNAL MEDICINE
Payer: MEDICARE

## 2023-06-23 ENCOUNTER — ANESTHESIA EVENT (OUTPATIENT)
Dept: CARDIOLOGY | Facility: MEDICAL CENTER | Age: 63
End: 2023-06-23
Payer: MEDICARE

## 2023-06-23 ENCOUNTER — ANESTHESIA (OUTPATIENT)
Dept: CARDIOLOGY | Facility: MEDICAL CENTER | Age: 63
End: 2023-06-23
Payer: MEDICARE

## 2023-06-23 VITALS
WEIGHT: 175.27 LBS | HEART RATE: 87 BPM | SYSTOLIC BLOOD PRESSURE: 134 MMHG | TEMPERATURE: 97.2 F | HEIGHT: 67 IN | RESPIRATION RATE: 18 BRPM | BODY MASS INDEX: 27.51 KG/M2 | DIASTOLIC BLOOD PRESSURE: 78 MMHG | OXYGEN SATURATION: 93 %

## 2023-06-23 DIAGNOSIS — I48.0 PAROXYSMAL ATRIAL FIBRILLATION (HCC): ICD-10-CM

## 2023-06-23 LAB
ACT BLD: 275 SEC (ref 74–137)
ACT BLD: 335 SEC (ref 74–137)
EKG IMPRESSION: NORMAL
INR PPP: 1.68 (ref 0.87–1.13)
LV EJECT FRACT  99904: 60
PROTHROMBIN TIME: 18.9 SEC (ref 12–14.6)

## 2023-06-23 PROCEDURE — 160035 HCHG PACU - 1ST 60 MINS PHASE I

## 2023-06-23 PROCEDURE — C1759 CATH, INTRA ECHOCARDIOGRAPHY: HCPCS

## 2023-06-23 PROCEDURE — 700105 HCHG RX REV CODE 258: Performed by: INTERNAL MEDICINE

## 2023-06-23 PROCEDURE — 93319 3D ECHO IMG CGEN CAR ANOMAL: CPT

## 2023-06-23 PROCEDURE — 93656 COMPRE EP EVAL ABLTJ ATR FIB: CPT | Performed by: INTERNAL MEDICINE

## 2023-06-23 PROCEDURE — 93010 ELECTROCARDIOGRAM REPORT: CPT | Mod: 59 | Performed by: STUDENT IN AN ORGANIZED HEALTH CARE EDUCATION/TRAINING PROGRAM

## 2023-06-23 PROCEDURE — 85347 COAGULATION TIME ACTIVATED: CPT

## 2023-06-23 PROCEDURE — 00537 ANES CARDIAC EP PROCEDURES: CPT | Performed by: ANESTHESIOLOGY

## 2023-06-23 PROCEDURE — 700101 HCHG RX REV CODE 250

## 2023-06-23 PROCEDURE — 700101 HCHG RX REV CODE 250: Performed by: ANESTHESIOLOGY

## 2023-06-23 PROCEDURE — 36415 COLL VENOUS BLD VENIPUNCTURE: CPT

## 2023-06-23 PROCEDURE — 93623 PRGRMD STIMJ&PACG IV RX NFS: CPT | Mod: 26 | Performed by: INTERNAL MEDICINE

## 2023-06-23 PROCEDURE — 700111 HCHG RX REV CODE 636 W/ 250 OVERRIDE (IP): Performed by: ANESTHESIOLOGY

## 2023-06-23 PROCEDURE — 93312 ECHO TRANSESOPHAGEAL: CPT | Mod: 26,59 | Performed by: ANESTHESIOLOGY

## 2023-06-23 PROCEDURE — 160047 HCHG PACU  - EA ADDL 30 MINS PHASE II

## 2023-06-23 PROCEDURE — 700111 HCHG RX REV CODE 636 W/ 250 OVERRIDE (IP)

## 2023-06-23 PROCEDURE — 93320 DOPPLER ECHO COMPLETE: CPT | Mod: 26 | Performed by: ANESTHESIOLOGY

## 2023-06-23 PROCEDURE — 93005 ELECTROCARDIOGRAM TRACING: CPT | Performed by: INTERNAL MEDICINE

## 2023-06-23 PROCEDURE — 160036 HCHG PACU - EA ADDL 30 MINS PHASE I

## 2023-06-23 PROCEDURE — 160046 HCHG PACU - 1ST 60 MINS PHASE II

## 2023-06-23 PROCEDURE — 85610 PROTHROMBIN TIME: CPT

## 2023-06-23 PROCEDURE — 93319 3D ECHO IMG CGEN CAR ANOMAL: CPT | Performed by: ANESTHESIOLOGY

## 2023-06-23 PROCEDURE — 160002 HCHG RECOVERY MINUTES (STAT)

## 2023-06-23 RX ORDER — MEPERIDINE HYDROCHLORIDE 25 MG/ML
12.5 INJECTION INTRAMUSCULAR; INTRAVENOUS; SUBCUTANEOUS
Status: DISCONTINUED | OUTPATIENT
Start: 2023-06-23 | End: 2023-06-23 | Stop reason: HOSPADM

## 2023-06-23 RX ORDER — DIPHENHYDRAMINE HYDROCHLORIDE 50 MG/ML
12.5 INJECTION INTRAMUSCULAR; INTRAVENOUS
Status: DISCONTINUED | OUTPATIENT
Start: 2023-06-23 | End: 2023-06-23 | Stop reason: HOSPADM

## 2023-06-23 RX ORDER — LIDOCAINE HYDROCHLORIDE 20 MG/ML
INJECTION, SOLUTION INFILTRATION; PERINEURAL
Status: COMPLETED
Start: 2023-06-23 | End: 2023-06-23

## 2023-06-23 RX ORDER — HALOPERIDOL 5 MG/ML
1 INJECTION INTRAMUSCULAR
Status: DISCONTINUED | OUTPATIENT
Start: 2023-06-23 | End: 2023-06-23 | Stop reason: HOSPADM

## 2023-06-23 RX ORDER — HYDRALAZINE HYDROCHLORIDE 20 MG/ML
5 INJECTION INTRAMUSCULAR; INTRAVENOUS
Status: DISCONTINUED | OUTPATIENT
Start: 2023-06-23 | End: 2023-06-23 | Stop reason: HOSPADM

## 2023-06-23 RX ORDER — HYDROMORPHONE HYDROCHLORIDE 1 MG/ML
0.2 INJECTION, SOLUTION INTRAMUSCULAR; INTRAVENOUS; SUBCUTANEOUS
Status: DISCONTINUED | OUTPATIENT
Start: 2023-06-23 | End: 2023-06-23 | Stop reason: HOSPADM

## 2023-06-23 RX ORDER — HEPARIN SODIUM 1000 [USP'U]/ML
INJECTION, SOLUTION INTRAVENOUS; SUBCUTANEOUS
Status: COMPLETED
Start: 2023-06-23 | End: 2023-06-23

## 2023-06-23 RX ORDER — ISOPROTERENOL HYDROCHLORIDE 0.2 MG/ML
INJECTION, SOLUTION INTRAVENOUS
Status: COMPLETED
Start: 2023-06-23 | End: 2023-06-23

## 2023-06-23 RX ORDER — OMEPRAZOLE 20 MG/1
20 CAPSULE, DELAYED RELEASE ORAL DAILY
COMMUNITY
End: 2023-07-24

## 2023-06-23 RX ORDER — HEPARIN SODIUM 200 [USP'U]/100ML
INJECTION, SOLUTION INTRAVENOUS
Status: COMPLETED
Start: 2023-06-23 | End: 2023-06-23

## 2023-06-23 RX ORDER — SODIUM CHLORIDE, SODIUM LACTATE, POTASSIUM CHLORIDE, CALCIUM CHLORIDE 600; 310; 30; 20 MG/100ML; MG/100ML; MG/100ML; MG/100ML
INJECTION, SOLUTION INTRAVENOUS CONTINUOUS
Status: DISCONTINUED | OUTPATIENT
Start: 2023-06-23 | End: 2023-06-23 | Stop reason: HOSPADM

## 2023-06-23 RX ORDER — ONDANSETRON 2 MG/ML
4 INJECTION INTRAMUSCULAR; INTRAVENOUS
Status: DISCONTINUED | OUTPATIENT
Start: 2023-06-23 | End: 2023-06-23 | Stop reason: HOSPADM

## 2023-06-23 RX ORDER — HYDROMORPHONE HYDROCHLORIDE 1 MG/ML
0.4 INJECTION, SOLUTION INTRAMUSCULAR; INTRAVENOUS; SUBCUTANEOUS
Status: DISCONTINUED | OUTPATIENT
Start: 2023-06-23 | End: 2023-06-23 | Stop reason: HOSPADM

## 2023-06-23 RX ORDER — HYDROMORPHONE HYDROCHLORIDE 1 MG/ML
0.1 INJECTION, SOLUTION INTRAMUSCULAR; INTRAVENOUS; SUBCUTANEOUS
Status: DISCONTINUED | OUTPATIENT
Start: 2023-06-23 | End: 2023-06-23 | Stop reason: HOSPADM

## 2023-06-23 RX ORDER — OXYCODONE HCL 5 MG/5 ML
5 SOLUTION, ORAL ORAL
Status: DISCONTINUED | OUTPATIENT
Start: 2023-06-23 | End: 2023-06-23 | Stop reason: HOSPADM

## 2023-06-23 RX ORDER — CEFAZOLIN SODIUM 1 G/3ML
INJECTION, POWDER, FOR SOLUTION INTRAMUSCULAR; INTRAVENOUS PRN
Status: DISCONTINUED | OUTPATIENT
Start: 2023-06-23 | End: 2023-06-23 | Stop reason: SURG

## 2023-06-23 RX ORDER — OXYCODONE HCL 5 MG/5 ML
10 SOLUTION, ORAL ORAL
Status: DISCONTINUED | OUTPATIENT
Start: 2023-06-23 | End: 2023-06-23 | Stop reason: HOSPADM

## 2023-06-23 RX ORDER — IPRATROPIUM BROMIDE AND ALBUTEROL SULFATE 2.5; .5 MG/3ML; MG/3ML
3 SOLUTION RESPIRATORY (INHALATION)
Status: DISCONTINUED | OUTPATIENT
Start: 2023-06-23 | End: 2023-06-23 | Stop reason: HOSPADM

## 2023-06-23 RX ORDER — MIDAZOLAM HYDROCHLORIDE 1 MG/ML
1 INJECTION INTRAMUSCULAR; INTRAVENOUS
Status: DISCONTINUED | OUTPATIENT
Start: 2023-06-23 | End: 2023-06-23 | Stop reason: HOSPADM

## 2023-06-23 RX ORDER — BUPIVACAINE HYDROCHLORIDE 5 MG/ML
INJECTION, SOLUTION EPIDURAL; INTRACAUDAL
Status: COMPLETED
Start: 2023-06-23 | End: 2023-06-23

## 2023-06-23 RX ORDER — SODIUM CHLORIDE, SODIUM LACTATE, POTASSIUM CHLORIDE, CALCIUM CHLORIDE 600; 310; 30; 20 MG/100ML; MG/100ML; MG/100ML; MG/100ML
INJECTION, SOLUTION INTRAVENOUS CONTINUOUS
Status: ACTIVE | OUTPATIENT
Start: 2023-06-23 | End: 2023-06-23

## 2023-06-23 RX ORDER — PROTAMINE SULFATE 10 MG/ML
INJECTION, SOLUTION INTRAVENOUS
Status: COMPLETED
Start: 2023-06-23 | End: 2023-06-23

## 2023-06-23 RX ORDER — LABETALOL HYDROCHLORIDE 5 MG/ML
5 INJECTION, SOLUTION INTRAVENOUS
Status: DISCONTINUED | OUTPATIENT
Start: 2023-06-23 | End: 2023-06-23 | Stop reason: HOSPADM

## 2023-06-23 RX ORDER — EPHEDRINE SULFATE 50 MG/ML
5 INJECTION, SOLUTION INTRAVENOUS
Status: DISCONTINUED | OUTPATIENT
Start: 2023-06-23 | End: 2023-06-23 | Stop reason: HOSPADM

## 2023-06-23 RX ADMIN — PROPOFOL 40 MG: 10 INJECTION, EMULSION INTRAVENOUS at 14:59

## 2023-06-23 RX ADMIN — CEFAZOLIN 2 G: 1 INJECTION, POWDER, FOR SOLUTION INTRAMUSCULAR; INTRAVENOUS at 15:08

## 2023-06-23 RX ADMIN — BUPIVACAINE HYDROCHLORIDE: 5 INJECTION, SOLUTION EPIDURAL; INTRACAUDAL at 14:49

## 2023-06-23 RX ADMIN — PROPOFOL 40 MG: 10 INJECTION, EMULSION INTRAVENOUS at 15:00

## 2023-06-23 RX ADMIN — PROTAMINE SULFATE 50 MG: 10 INJECTION, SOLUTION INTRAVENOUS at 16:08

## 2023-06-23 RX ADMIN — HEPARIN SODIUM: 1000 INJECTION, SOLUTION INTRAVENOUS; SUBCUTANEOUS at 16:09

## 2023-06-23 RX ADMIN — ROCURONIUM BROMIDE 10 MG: 10 INJECTION, SOLUTION INTRAVENOUS at 15:32

## 2023-06-23 RX ADMIN — MEPERIDINE HYDROCHLORIDE 12.5 MG: 25 INJECTION INTRAMUSCULAR; INTRAVENOUS; SUBCUTANEOUS at 17:01

## 2023-06-23 RX ADMIN — SUGAMMADEX 200 MG: 100 INJECTION, SOLUTION INTRAVENOUS at 16:09

## 2023-06-23 RX ADMIN — SODIUM CHLORIDE, POTASSIUM CHLORIDE, SODIUM LACTATE AND CALCIUM CHLORIDE: 600; 310; 30; 20 INJECTION, SOLUTION INTRAVENOUS at 14:46

## 2023-06-23 RX ADMIN — ISOPROTERENOL HYDROCHLORIDE 0.2 MG: 0.2 INJECTION, SOLUTION INTRACARDIAC; INTRAMUSCULAR; INTRAVENOUS; SUBCUTANEOUS at 16:08

## 2023-06-23 RX ADMIN — ROCURONIUM BROMIDE 50 MG: 10 INJECTION, SOLUTION INTRAVENOUS at 14:59

## 2023-06-23 RX ADMIN — PROPOFOL 120 MG: 10 INJECTION, EMULSION INTRAVENOUS at 14:58

## 2023-06-23 RX ADMIN — LABETALOL HYDROCHLORIDE 5 MG: 5 INJECTION INTRAVENOUS at 17:51

## 2023-06-23 RX ADMIN — LABETALOL HYDROCHLORIDE 5 MG: 5 INJECTION INTRAVENOUS at 17:23

## 2023-06-23 RX ADMIN — LIDOCAINE HYDROCHLORIDE: 20 INJECTION, SOLUTION INFILTRATION; PERINEURAL at 14:49

## 2023-06-23 RX ADMIN — HEPARIN SODIUM 6000 UNITS: 200 INJECTION, SOLUTION INTRAVENOUS at 14:49

## 2023-06-23 RX ADMIN — MEPERIDINE HYDROCHLORIDE 12.5 MG: 25 INJECTION INTRAMUSCULAR; INTRAVENOUS; SUBCUTANEOUS at 16:32

## 2023-06-23 RX ADMIN — FENTANYL CITRATE 25 MCG: 50 INJECTION, SOLUTION INTRAMUSCULAR; INTRAVENOUS at 17:09

## 2023-06-23 RX ADMIN — ROCURONIUM BROMIDE 10 MG: 10 INJECTION, SOLUTION INTRAVENOUS at 15:45

## 2023-06-23 ASSESSMENT — PAIN DESCRIPTION - PAIN TYPE
TYPE: SURGICAL PAIN

## 2023-06-23 ASSESSMENT — FIBROSIS 4 INDEX: FIB4 SCORE: 1.48

## 2023-06-23 NOTE — ANESTHESIA POSTPROCEDURE EVALUATION
Patient: Juan Luis Rolon    Procedure Summary     Date: 06/23/23 Room / Location: [facility] IMAGING - CATH LAB [facility]    Anesthesia Start: 1446 Anesthesia Stop: 1626    Procedure: CL-EP ABLATION ATRIAL FIBRILLATION Diagnosis:       Paroxysmal atrial fibrillation (HCC)      Paroxysmal atrial fibrillation      Atrial fibrillation (HCC)      (See Associated Dx)    Scheduled Providers: Ranjith Garcia M.D.; Jagdish Zelaya M.D. Responsible Provider: Jagdish Zelaya M.D.    Anesthesia Type: general ASA Status: 2          Final Anesthesia Type: general  Last vitals  BP   Blood Pressure : (!) 167/86    Temp   36.2 °C (97.1 °F)    Pulse   62   Resp   14    SpO2   100 %      Anesthesia Post Evaluation    Patient location during evaluation: PACU  Patient participation: complete - patient participated  Level of consciousness: awake and alert    Airway patency: patent  Anesthetic complications: no  Cardiovascular status: hemodynamically stable  Respiratory status: acceptable  Hydration status: euvolemic    PONV: none          No notable events documented.     Nurse Pain Score: 4 (NPRS)

## 2023-06-23 NOTE — ANESTHESIA PREPROCEDURE EVALUATION
Date/Time: 06/23/23 1300    Scheduled providers: Ranjith Garcia M.D.; Jagdish Zelaya M.D.    Procedure: CL-EP ABLATION ATRIAL FIBRILLATION    Diagnosis:       Paroxysmal atrial fibrillation (HCC) [I48.0]      Paroxysmal atrial fibrillation [I48.0]      Atrial fibrillation (HCC) [I48.91]    Indications: See Associated Dx    Location: [facility] IMAGING - CATH LAB - [facility]          Relevant Problems   NEURO   (positive) Transient ischemic attack      CARDIAC   (positive) Atrial fibrillation (HCC)   (positive) Carotid artery dissection (HCC)   (positive) Carotid occlusion, left   (positive) Essential hypertension      Other   (positive) Hx of TIA (transient ischemic attack) and stroke       Physical Exam    Airway   Mallampati: II  TM distance: >3 FB  Neck ROM: full       Cardiovascular - normal exam  Rhythm: regular  Rate: normal  (-) murmur     Dental - normal exam           Pulmonary - normal exam  Breath sounds clear to auscultation     Abdominal    Neurological - normal exam                 Anesthesia Plan    ASA 2       Plan - general       Airway plan will be ETT  ELIANE Planned        Induction: intravenous    Postoperative Plan: Postoperative administration of opioids is intended.    Pertinent diagnostic labs and testing reviewed    Informed Consent:    Anesthetic plan and risks discussed with patient.    Use of blood products discussed with: patient whom consented to blood products.

## 2023-06-23 NOTE — OP REPORT
Electrophysiology Procedure Note  [facility] – [facility] of the [facility]    Procedures Performed:  Pulmonary Vein Isolation  Intracardiac Echocardiography  Three-dimensional intracardiac mapping  IV isoproterenol infusion with programmed stimulation    Electrophysiologist: Ranjith Garcia MD    Assistant(s): None    Anesthesia: General anesthesia was provided by Dr. Zelaya of the Anesthesiology service.    Statement of Medical Necessity: This is a 63  year-old female with history of symptomatic paroxysmal atrial  fibrillation     Pre-procedure ECG: Sinus     Post-procedure ECG: Sinus     Description of Procedure:    Access and catheter placement: After obtaining informed written consent, the patient was  brought to the EP lab in the fasting, non-sedated stated. The patient was sedated and intubated  by the anesthesiologist. The patient was prepped and draped in the usual sterile fashion. Using  the modified Seldinger technique, access was obtained in the right  femoral vein. Guidewires were advanced into the IVC. In the right femoral vein, 3 sheaths of   8F were placed. A deflectable  decapolar catheter was advanced through the LFV to the coronary sinus. A 8F intracardiac  echo catheter was advanced to the right atrium. Through an 8F sheath,  a long wire was advanced to the SVC. The short sheath was  changed out for a medium-curl Vizigo (Biosense Medina) sheath which was advanced to the SVC. The wire was  removed and the dilator was flushed. A 98-cm transseptal needle (illuminate Solutions) was advanced to the tip of the  dilator. The transseptal needle was attached to the manifold and  flushed. Under intracardiac echocardiographic guidance, the sheath/needle   system was withdrawn to the fossa ovalis. At this time, 12,000 units of heparin were administered.   Additional boluses of heparin were given as needed to keep -350 sec during LA dwell time.   The needle was advanced out of the dilator, and RF energy applied via the [employer]inate Solutions  needle.   ICE visualized the needle passage through the fossa ovalis into the left  atrium. Confirmation of left atrial location was confirmed by injecting saline and transducing  pressure. The dilator was advanced over the needle into the left atrium, and then the sheath was advanced over the dilator. The dilator and  needle were removed. The sheath was flushed and connected to a continuous heparinized   saline drip.       A duodecapolar Penta-Ray catheter (BiosGEOCOMtms-Medina) was  advanced through the Vizigo sheath into the left atrium. A 3-dimensional electroanatomical  map was constructed using the CARTO system. The Penta-ray was removed and a 3.5-mm externally-irrigated ablation catheter (Biosense-Medina   Thermocool ST SF DF-curve) was advanced through the Vizigo sheath into the left atrium.     Attention was first turned to the left pulmonary veins. The Penta-Ray  catheter was placed in the LSPV and LIPV which showed conduction into the vein. A circumferential  ablation line using radiofrequency energy 20-40W was placed which resulted in LSPV and LIPV isolation.  The catheters were then moved to the RSPV and RIPV which showed evidence of conduction into the veins,   and ablation 20-40W was performed circumferentially around these veins   resulting in RSPV and RIPV isolation. Power was reduced near the esophagus.  The Penta-Ray was advanced into all four pulmonary veins and persistent conduction block into the right and left pulmonary veins was demonstrated.     Isuprel 2-4mcg/min was administered and burst pacing was performed in the left atrium without induction of sustained AF, AFL, or SVT.     The left atrial sheath and catheter were withdrawn to the right atrium.   ICE visualization   of the pericardium confirmed no pericardial effusion at the end of the case. The  patient was awakened from anesthesia, catheters and sheaths were removed, Vascade MVP closure devices were deployed, and manual  pressure was  held on bilateral groins until hemostasis was achieved.     Electrophysiological Findings:  Sinus cycle length 934 msec    QRS 95 msec   msec   msec  AV block  ms  AVERP: 600/260 ms    Total RF time: 1014 sec  Fluoro time: 0 min        Estimated blood loss - 30 mL    Complications: None    Impression:  1. Atrial fibrillation, paroxysmal  2. Successful isolation of all four pulmonary veins    Recommendations:  1. Bed rest for 2 hours  2. Telemetry monitoring during recovery  3. Anticoagulant therapy for at least 1 month (MARLYN-C in situ)  4. Follow up in Arrhythmia clinic in 4 weeks      Ranjith Garcia MD  Cardiac Electrophysiology

## 2023-06-23 NOTE — ANESTHESIA TIME REPORT
Anesthesia Start and Stop Event Times     Date Time Event    6/23/2023 1356 Ready for Procedure     1446 Anesthesia Start     1626 Anesthesia Stop        Responsible Staff  06/23/23    Name Role Begin End    Jagdish Zelaya M.D. Anesth 1446 1626        Overtime Reason:  no overtime (within assigned shift)    Comments:

## 2023-06-23 NOTE — ANESTHESIA PROCEDURE NOTES
ELIANE    Date/Time: 6/23/2023 3:09 PM    Performed by: Jagdish Zelaya M.D.  Authorized by: Jagdish Zelaya M.D.    Start Time:6/23/2023 3:09 PM  Preanesthetic Checklist: patient identified, IV checked, site marked, risks and benefits discussed, surgical consent, monitors and equipment checked, pre-op evaluation and timeout performed    Indication for ELIANE: diagnostic   Patient Location: OR  Intubated: Yes  Bite Block: Yes  Heart Visualized: Yes  Insertion: atraumatic    **See FULL ELIANE report in patient's chart via CV Synapse**

## 2023-06-23 NOTE — ANESTHESIA PROCEDURE NOTES
Airway    Date/Time: 6/23/2023 3:01 PM    Performed by: Jagdish Zelaya M.D.  Authorized by: Jagdish Zelaya M.D.    Location:  OR  Urgency:  Elective  Difficult Airway: No    Indications for Airway Management:  Anesthesia      Spontaneous Ventilation: absent    Sedation Level:  Deep  Preoxygenated: Yes    Patient Position:  Sniffing  Mask Difficulty Assessment:  1 - vent by mask  Final Airway Type:  Endotracheal airway  Final Endotracheal Airway:  ETT  Cuffed: Yes    Technique Used for Successful ETT Placement:  Direct laryngoscopy    Insertion Site:  Oral  Blade Type:  Owens  Laryngoscope Blade/Videolaryngoscope Blade Size:  2  ETT Size (mm):  7.5  Measured from:  Teeth  ETT to Teeth (cm):  22  Placement Verified by: auscultation and capnometry    Cormack-Lehane Classification:  Grade I - full view of glottis  Number of Attempts at Approach:  1

## 2023-06-23 NOTE — DISCHARGE INSTRUCTIONS
HOME CARE INSTRUCTIONS    ACTIVITY: Rest and take it easy for the first 24 hours.  A responsible adult is recommended to remain with you during that time.  It is normal to feel sleepy.  We encourage you to not do anything that requires balance, judgment or coordination.    FOR 24 HOURS DO NOT:  Drive, operate machinery or run household appliances.  Drink beer or alcoholic beverages.  Make important decisions or sign legal documents.    SPECIAL INSTRUCTIONS: [facility] Post Ablation Patient Instructions:  No lifting > 10 lbs x 1 week.      No soaking in baths, hot tubs, pools x 1 week.  May shower the day after discharge and take off groin dressings and leave  sites uncovered.  Continue to monitor sites daily for warmth, redness, discolored drainage.  It is common to have a small lump in the area where the cather was (usually the size of a marble); this will go away but takes approximately 6 weeks to normalize.     3.   Please take all medications as prescribed to you; please do not stop any medications prescribed post ablation unless directed by your healthcare provider.      4.   Please do not miss any doses of your blood thinner (if you have been started on, or take chronic blood thinners) without discussion with your healthcare provider first.     5.   Please walk and take deep breaths after discharge.  After discharge, if you experience neurological changes/signs of stroke or high fever you should be seen in the emergency dept.     6.   It is possible you may experience some chest discomfort or chest tightness post ablation.  This is usually secondary to inflammation and irritation of the tissues at the area of the ablation.  If this occurs, it is advised to try 400 mg of Ibuprofen with food as needed up to three times a day for a maximum of two days.  This should help to decrease pain and tissue inflammation.          **Please notify the office ([phone]) if this occurs.          ** DO NOT TAKE Ibuprofen IF HISTORY OF ALLERGY, SIGNIFICANT BLEEDING OR KIDNEY DISEASE WITHOUT DISCUSSING WITH YOUR CARDIOLOGY PROVIDER FIRST.          ** If pain becomes severe or you have additional symptoms you may need to be medically evaluated; please contact the cardiology office ([phone]) for further direction.     7. It is possible that you may experience arrhythmia/Atrial Fibrillation post ablation.  This is secondary to irritation and inflammation of the cardiac tissues from the ablation.  If you have atrial fibrillation all day or feel poorly with it, please notify your cardiologist's via phone ([phone]) or [employer].      8.  Please contact call our office ([phone]) or message via [employer] message if you have any questions or concerns post procedurally.    9. You need to be seen for post ablation follow up 3-4 weeks post procedure. An appointment is scheduled for you.  Please contact the office ([phone]) if you need to change your appointment.      DIET: To avoid nausea, slowly advance diet as tolerated, avoiding spicy or greasy foods for the first day.  Add more substantial food to your diet according to your physician's instructions.  Babies can be fed formula or breast milk as soon as they are hungry.  INCREASE FLUIDS AND FIBER TO AVOID CONSTIPATION.    MEDICATIONS: Resume taking daily medication.  Take prescribed pain medication with food.  If no medication is prescribed, you may take non-aspirin pain medication if needed.  PAIN MEDICATION CAN BE VERY CONSTIPATING.  Take a stool softener or laxative such as senokot, pericolace, or milk of magnesia if needed.    A follow-up appointment should be arranged with your doctor in 1-2 weeks; call to schedule.    You should CALL YOUR PHYSICIAN if you develop:  Fever greater than 101 degrees F.  Pain not relieved by medication, or persistent nausea or vomiting.  Excessive bleeding (blood soaking through dressing) or unexpected drainage  from the wound.  Extreme redness or swelling around the incision site, drainage of pus or foul smelling drainage.  Inability to urinate or empty your bladder within 8 hours.  Problems with breathing or chest pain.    You should call 911 if you develop problems with breathing or chest pain.  If you are unable to contact your doctor or surgical center, you should go to the nearest emergency room or urgent care center.  Physician's telephone #: [phone]    MILD FLU-LIKE SYMPTOMS ARE NORMAL.  YOU MAY EXPERIENCE GENERALIZED MUSCLE ACHES, THROAT IRRITATION, HEADACHE AND/OR SOME NAUSEA.    If any questions arise, call your doctor.  If your doctor is not available, please feel free to call the Surgical Center at [phone].  The Center is open Monday through Friday from 7AM to 7PM.      A registered nurse may call you a few days after your surgery to see how you are doing after your procedure.    You may also receive a survey in the mail within the next two weeks and we ask that you take a few moments to complete the survey and return it to us.  Our goal is to provide you with very good care and we value your comments.     Depression / Suicide Risk    As you are discharged from this [facility] facility, it is important to learn how to keep safe from harming yourself.    Recognize the warning signs:  Abrupt changes in personality, positive or negative- including increase in energy   Giving away possessions  Change in eating patterns- significant weight changes-  positive or negative  Change in sleeping patterns- unable to sleep or sleeping all the time   Unwillingness or inability to communicate  Depression  Unusual sadness, discouragement and loneliness  Talk of wanting to die  Neglect of personal appearance   Rebelliousness- reckless behavior  Withdrawal from people/activities they love  Confusion- inability to concentrate     If you or a loved one observes any of these behaviors or has concerns about self-harm,  here's what you can do:  Talk about it- your feelings and reasons for harming yourself  Remove any means that you might use to hurt yourself (examples: pills, rope, extension cords, firearm)  Get professional help from the community (Mental Health, Substance Abuse, psychological counseling)  Do not be alone:Call your Safe Contact- someone whom you trust who will be there for you.  Call your local CRISIS HOTLINE 257-9670 or 059-501-7738  Call your local Children's Mobile Crisis Response Team [place] (094) 924-9846 or www.[place]  Call the toll free National Suicide Prevention Hotlines   National Suicide Prevention Lifeline 620-217-TOBO (7328)  National Hope Line Network 800-SUICIDE (525-9402)    I acknowledge receipt and understanding of these Home Care instructions.

## 2023-06-24 NOTE — OR NURSING
Arrived from PACU AXO, Pt's VSS; denies N/V; states pain is at tolerable level. Scant blood to right groin.     18:47 when patient getting up from the gurney to get into a discharge chair she started to bleed. Manual pressure is applied. Have messaged APRN, waiting for return message.     18:51 APRN states to hold pressure for 15 mins, if soft but still oozing try dermabond. IF it starts to swell or have more bleeding to call Dr Purcell. 1 RN to hold pressure for 15 mins.    1910 RN released pressure, site is soft and intact.     1919 Box lunch given to patient.     1922 Dermabond applied to R groin.       D/c orders received. IV dc'd. Pt changed into clothing with assistance. Discharge reviewed, Pt and family verbalized understanding and questions answered. Patient states ready to d/c home. Pt dc'd in w/c with daughter.

## 2023-06-24 NOTE — OR NURSING
Patient arrived calm and arousable to PACU. VSS. Bruising noted at right groin site, nurse informed bruising is related to watchman procedure performed 10 days prior. Dr. Garcia and Dr. Harper at bedside for assessment of groin site and patient condition. 2 hour strict bedrest implemented.     Patient transported to Phase 2 with RN on room air @ 93%.     When patient was transferred to chair in Phase 2, bleeding was noted at groin site. Pressure applied, dressing changed, sandbag applied, Dr. Harper notified.    Report provided to Monalisa Ayers RN.

## 2023-06-26 ENCOUNTER — TELEPHONE (OUTPATIENT)
Dept: CARDIOLOGY | Facility: MEDICAL CENTER | Age: 63
End: 2023-06-26
Payer: MEDICARE

## 2023-06-26 LAB — EKG IMPRESSION: NORMAL

## 2023-06-26 NOTE — TELEPHONE ENCOUNTER
Echocardiography Laboratory  CONCLUSIONS  Normal LV function.  Watchman device in place in the L atrial   appendage.  No thrombus is present on the Watchman device.

## 2023-06-26 NOTE — TELEPHONE ENCOUNTER
----- Message from Christiana Blankenship R.N. sent at 6/26/2023  7:05 AM PDT -----  LAAC post protocol

## 2023-06-26 NOTE — TELEPHONE ENCOUNTER
Ranjith Garcia M.D.  You 15 minutes ago (10:05 AM)       Yes - she still needs a ELIANE in one month and after that can stop OAC

## 2023-06-26 NOTE — TELEPHONE ENCOUNTER
You  Ranjith Garcia M.D. 42 minutes ago (9:38 AM)       Pt s/p laac ELIANE looked good however just had ablation last week should he continue blood thinners?

## 2023-07-24 ENCOUNTER — PRE-ADMISSION TESTING (OUTPATIENT)
Dept: ADMISSIONS | Facility: MEDICAL CENTER | Age: 63
End: 2023-07-24
Payer: MEDICARE

## 2023-07-24 RX ORDER — COLCHICINE 0.6 MG/1
TABLET ORAL
COMMUNITY
Start: 2023-06-19 | End: 2023-09-27

## 2023-07-25 ENCOUNTER — ANESTHESIA EVENT (OUTPATIENT)
Dept: SURGERY | Facility: MEDICAL CENTER | Age: 63
End: 2023-07-25
Payer: MEDICARE

## 2023-07-25 ENCOUNTER — HOSPITAL ENCOUNTER (OUTPATIENT)
Facility: MEDICAL CENTER | Age: 63
End: 2023-07-25
Attending: INTERNAL MEDICINE | Admitting: INTERNAL MEDICINE
Payer: MEDICARE

## 2023-07-25 ENCOUNTER — APPOINTMENT (OUTPATIENT)
Dept: CARDIOLOGY | Facility: MEDICAL CENTER | Age: 63
End: 2023-07-25
Attending: INTERNAL MEDICINE
Payer: MEDICARE

## 2023-07-25 ENCOUNTER — ANESTHESIA (OUTPATIENT)
Dept: SURGERY | Facility: MEDICAL CENTER | Age: 63
End: 2023-07-25
Payer: MEDICARE

## 2023-07-25 VITALS
RESPIRATION RATE: 16 BRPM | HEIGHT: 67 IN | HEART RATE: 67 BPM | OXYGEN SATURATION: 97 % | DIASTOLIC BLOOD PRESSURE: 78 MMHG | WEIGHT: 174.82 LBS | TEMPERATURE: 97.2 F | SYSTOLIC BLOOD PRESSURE: 132 MMHG | BODY MASS INDEX: 27.44 KG/M2

## 2023-07-25 DIAGNOSIS — I48.0 PAROXYSMAL ATRIAL FIBRILLATION (HCC): ICD-10-CM

## 2023-07-25 PROCEDURE — 160002 HCHG RECOVERY MINUTES (STAT)

## 2023-07-25 PROCEDURE — 160046 HCHG PACU - 1ST 60 MINS PHASE II

## 2023-07-25 PROCEDURE — 93312 ECHO TRANSESOPHAGEAL: CPT | Mod: 26 | Performed by: INTERNAL MEDICINE

## 2023-07-25 PROCEDURE — 160035 HCHG PACU - 1ST 60 MINS PHASE I

## 2023-07-25 PROCEDURE — 700105 HCHG RX REV CODE 258: Mod: JZ | Performed by: STUDENT IN AN ORGANIZED HEALTH CARE EDUCATION/TRAINING PROGRAM

## 2023-07-25 PROCEDURE — 700111 HCHG RX REV CODE 636 W/ 250 OVERRIDE (IP): Performed by: STUDENT IN AN ORGANIZED HEALTH CARE EDUCATION/TRAINING PROGRAM

## 2023-07-25 PROCEDURE — 93325 DOPPLER ECHO COLOR FLOW MAPG: CPT

## 2023-07-25 PROCEDURE — 01922 ANES N-INVAS IMG/RADJ THER: CPT | Performed by: STUDENT IN AN ORGANIZED HEALTH CARE EDUCATION/TRAINING PROGRAM

## 2023-07-25 RX ORDER — HYDRALAZINE HYDROCHLORIDE 20 MG/ML
5 INJECTION INTRAMUSCULAR; INTRAVENOUS
Status: DISCONTINUED | OUTPATIENT
Start: 2023-07-25 | End: 2023-07-25 | Stop reason: HOSPADM

## 2023-07-25 RX ORDER — PHENYLEPHRINE HYDROCHLORIDE 10 MG/ML
INJECTION, SOLUTION INTRAMUSCULAR; INTRAVENOUS; SUBCUTANEOUS PRN
Status: DISCONTINUED | OUTPATIENT
Start: 2023-07-25 | End: 2023-07-25 | Stop reason: SURG

## 2023-07-25 RX ORDER — HYDROMORPHONE HYDROCHLORIDE 1 MG/ML
0.4 INJECTION, SOLUTION INTRAMUSCULAR; INTRAVENOUS; SUBCUTANEOUS
Status: DISCONTINUED | OUTPATIENT
Start: 2023-07-25 | End: 2023-07-25 | Stop reason: HOSPADM

## 2023-07-25 RX ORDER — ONDANSETRON 2 MG/ML
4 INJECTION INTRAMUSCULAR; INTRAVENOUS
Status: DISCONTINUED | OUTPATIENT
Start: 2023-07-25 | End: 2023-07-25 | Stop reason: HOSPADM

## 2023-07-25 RX ORDER — EPHEDRINE SULFATE 50 MG/ML
5 INJECTION, SOLUTION INTRAVENOUS
Status: DISCONTINUED | OUTPATIENT
Start: 2023-07-25 | End: 2023-07-25 | Stop reason: HOSPADM

## 2023-07-25 RX ORDER — OXYCODONE HCL 5 MG/5 ML
5 SOLUTION, ORAL ORAL
Status: DISCONTINUED | OUTPATIENT
Start: 2023-07-25 | End: 2023-07-25 | Stop reason: HOSPADM

## 2023-07-25 RX ORDER — LABETALOL HYDROCHLORIDE 5 MG/ML
5 INJECTION, SOLUTION INTRAVENOUS
Status: DISCONTINUED | OUTPATIENT
Start: 2023-07-25 | End: 2023-07-25 | Stop reason: HOSPADM

## 2023-07-25 RX ORDER — HYDROMORPHONE HYDROCHLORIDE 1 MG/ML
0.2 INJECTION, SOLUTION INTRAMUSCULAR; INTRAVENOUS; SUBCUTANEOUS
Status: DISCONTINUED | OUTPATIENT
Start: 2023-07-25 | End: 2023-07-25 | Stop reason: HOSPADM

## 2023-07-25 RX ORDER — SODIUM CHLORIDE, SODIUM LACTATE, POTASSIUM CHLORIDE, CALCIUM CHLORIDE 600; 310; 30; 20 MG/100ML; MG/100ML; MG/100ML; MG/100ML
INJECTION, SOLUTION INTRAVENOUS CONTINUOUS
Status: DISCONTINUED | OUTPATIENT
Start: 2023-07-25 | End: 2023-07-25 | Stop reason: HOSPADM

## 2023-07-25 RX ORDER — DIPHENHYDRAMINE HYDROCHLORIDE 50 MG/ML
12.5 INJECTION INTRAMUSCULAR; INTRAVENOUS
Status: DISCONTINUED | OUTPATIENT
Start: 2023-07-25 | End: 2023-07-25 | Stop reason: HOSPADM

## 2023-07-25 RX ORDER — HALOPERIDOL 5 MG/ML
1 INJECTION INTRAMUSCULAR
Status: DISCONTINUED | OUTPATIENT
Start: 2023-07-25 | End: 2023-07-25 | Stop reason: HOSPADM

## 2023-07-25 RX ORDER — OXYCODONE HCL 5 MG/5 ML
10 SOLUTION, ORAL ORAL
Status: DISCONTINUED | OUTPATIENT
Start: 2023-07-25 | End: 2023-07-25 | Stop reason: HOSPADM

## 2023-07-25 RX ORDER — HYDROMORPHONE HYDROCHLORIDE 1 MG/ML
0.1 INJECTION, SOLUTION INTRAMUSCULAR; INTRAVENOUS; SUBCUTANEOUS
Status: DISCONTINUED | OUTPATIENT
Start: 2023-07-25 | End: 2023-07-25 | Stop reason: HOSPADM

## 2023-07-25 RX ORDER — SODIUM CHLORIDE, SODIUM LACTATE, POTASSIUM CHLORIDE, CALCIUM CHLORIDE 600; 310; 30; 20 MG/100ML; MG/100ML; MG/100ML; MG/100ML
INJECTION, SOLUTION INTRAVENOUS
Status: DISCONTINUED | OUTPATIENT
Start: 2023-07-25 | End: 2023-07-25 | Stop reason: SURG

## 2023-07-25 RX ADMIN — PHENYLEPHRINE HYDROCHLORIDE 100 MCG: 10 INJECTION INTRAVENOUS at 10:23

## 2023-07-25 RX ADMIN — SODIUM CHLORIDE, POTASSIUM CHLORIDE, SODIUM LACTATE AND CALCIUM CHLORIDE: 600; 310; 30; 20 INJECTION, SOLUTION INTRAVENOUS at 10:20

## 2023-07-25 RX ADMIN — PROPOFOL 50 MG: 10 INJECTION, EMULSION INTRAVENOUS at 10:35

## 2023-07-25 RX ADMIN — PROPOFOL 50 MG: 10 INJECTION, EMULSION INTRAVENOUS at 10:27

## 2023-07-25 RX ADMIN — PHENYLEPHRINE HYDROCHLORIDE 100 MCG: 10 INJECTION INTRAVENOUS at 10:31

## 2023-07-25 RX ADMIN — PROPOFOL 100 MG: 10 INJECTION, EMULSION INTRAVENOUS at 10:23

## 2023-07-25 RX ADMIN — PROPOFOL 50 MG: 10 INJECTION, EMULSION INTRAVENOUS at 10:30

## 2023-07-25 ASSESSMENT — ENCOUNTER SYMPTOMS
CONSTIPATION: 0
BACK PAIN: 0
ORTHOPNEA: 0
FALLS: 0
WHEEZING: 0
DIARRHEA: 0
CLAUDICATION: 0
BRUISES/BLEEDS EASILY: 0
MYALGIAS: 0
PND: 0
BLURRED VISION: 0
VOMITING: 0
BLOOD IN STOOL: 0
SORE THROAT: 0
DIZZINESS: 0
WEIGHT LOSS: 0
FEVER: 0
HEADACHES: 0
COUGH: 0
CHILLS: 0
SHORTNESS OF BREATH: 0
WEAKNESS: 0
PHOTOPHOBIA: 0
NAUSEA: 0
PALPITATIONS: 0

## 2023-07-25 ASSESSMENT — PAIN DESCRIPTION - PAIN TYPE
TYPE: SURGICAL PAIN

## 2023-07-25 ASSESSMENT — FIBROSIS 4 INDEX: FIB4 SCORE: 1.48

## 2023-07-25 NOTE — TELEPHONE ENCOUNTER
Due to anesthesia availability, patient has been scheduled with anesthesia. New orders sent to cath lab scheduling.

## 2023-07-25 NOTE — ANESTHESIA TIME REPORT
Anesthesia Start and Stop Event Times     Date Time Event    7/25/2023 1010 Ready for Procedure     1020 Anesthesia Start     1045 Anesthesia Stop        Responsible Staff  07/25/23    Name Role Begin End    Darrel Kohler D.O. Anesth 1020 1045        Overtime Reason:  no overtime (within assigned shift)    Comments:

## 2023-07-25 NOTE — H&P
Physician H&P    Patient ID:  Juan Luis Rolon  [id number]  63 y.o. female  1960    History:  Primary Diagnosis: s/p Watchman device    HPI:  Patient is a 63-year-old female with recent placement of Watchman device.  Presents today for ELIANE to evaluate Watchman device further.  Denies any concerning cardiac symptoms.      Past Medical History:  has a past medical history of Arrhythmia (12/23/2022), Hypertension, and Stroke (HCC) (5/11/2011).  Past Surgical History:  has no past surgical history on file.  Past Social History:  reports that she has never smoked. She has never used smokeless tobacco. She reports current alcohol use of about 3.5 oz of alcohol per week. She reports that she does not use drugs.  Past Family History: History reviewed. No pertinent family history.  Allergies: Atorvastatin, Codeine, Morphine, Plavix [clopidogrel bisulfate], and Warfarin    Current Medications:  Prior to Admission medications    Medication Sig Start Date End Date Taking? Authorizing Provider   colchicine (COLCRYS) 0.6 MG Tab Take 1 Tablet (0.6 mg) by mouth 2 times daily. 6/19/23   Physician Outpatient   aspirin (ASA) 81 MG Chew Tab chewable tablet Chew 1 Tablet every day. 6/15/23   ROXY Ryan.P.R.LAITH   losartan (COZAAR) 25 MG Tab TAKE 1 TABLET BY MOUTH  DAILY  Patient taking differently: Take 25 mg by mouth every day. 4/28/23   NBA LopezPNAHED   terazosin (HYTRIN) 1 MG Cap TAKE 1 CAPSULE BY MOUTH  EVERY NIGHT AT BEDTIME  Patient taking differently: Take 1 mg by mouth every day. 4/28/23   NBA LopezPNAHED   apixaban (ELIQUIS) 5mg Tab Take 1 Tablet by mouth 2 times a day. 4/12/23   ROXY Person.P.N.   amLODIPine (NORVASC) 2.5 MG Tab TAKE 1 TABLET BY MOUTH  DAILY 3/13/23   LEANNE Gusman.R.KYLER.   atenolol (TENORMIN) 50 MG Tab TAKE 1 TABLET BY MOUTH  DAILY 2/27/23   ROXY Person.P.N.   spironolactone/hctz (ALDACTAZIDE) 25-25 MG Tab TAKE ONE-HALF TABLET BY  MOUTH  "DAILY  Patient taking differently: Take 0.5 Tablets by mouth every day. 11/28/22   YUNIEL Gusman.   L-Lysine 500 MG Tab Take 500 mg by mouth every day.    Physician Outpatient   tretinoin (RETIN-A) 0.025 % cream Apply 1 Application topically every evening. 3/9/22   Physician Outpatient   docosahexanoic acid (OMEGA 3 FA) 1000 MG CAPS Take 1,000 mg by mouth every day.    Physician Outpatient       Review of Systems:  Review of Systems   Constitutional:  Negative for chills, fever, malaise/fatigue and weight loss.   HENT:  Negative for congestion and sore throat.    Eyes:  Negative for blurred vision and photophobia.   Respiratory:  Negative for cough, shortness of breath and wheezing.    Cardiovascular:  Negative for chest pain, palpitations, orthopnea, claudication, leg swelling and PND.   Gastrointestinal:  Negative for blood in stool, constipation, diarrhea, nausea and vomiting.   Musculoskeletal:  Negative for back pain, falls and myalgias.   Neurological:  Negative for dizziness, weakness and headaches.   Endo/Heme/Allergies:  Does not bruise/bleed easily.     BP (!) 160/93   Pulse 67   Temp 36.2 °C (97.2 °F) (Temporal)   Resp 16   Ht 1.702 m (5' 7\")   Wt 79.3 kg (174 lb 13.2 oz)   SpO2 98%     Physical Examination:  Physical Exam  Constitutional:       Appearance: Normal appearance.   HENT:      Head: Normocephalic and atraumatic.      Nose: Nose normal.      Mouth/Throat:      Mouth: Mucous membranes are moist.   Eyes:      Extraocular Movements: Extraocular movements intact.      Pupils: Pupils are equal, round, and reactive to light.   Cardiovascular:      Rate and Rhythm: Normal rate and regular rhythm.   Pulmonary:      Effort: Pulmonary effort is normal.      Breath sounds: Normal breath sounds.   Abdominal:      General: Abdomen is flat.   Musculoskeletal:      Cervical back: Normal range of motion.   Skin:     General: Skin is warm.   Neurological:      Mental Status: She is alert. "         Impression:  S/p Watchman device    Plan:  Patient presents today for ELIANE.  Has been n.p.o. since midnight.    The risks, benefits, and alternatives to transesophageal echocardiogram were discussed with the patient in specific detail, including oropharyngeal and esophageal traumas including hoarseness and dysphagia after the procedure. Rare cases demonstrating serious or fatal complications associated with transesophageal echocardiogram have been reported in the adult population, including cardiac, pulmonary and bleeding complications in less than 1% of people. Patients with an identified intracardiac thrombus are at increased risk for embolic events and this appears to be reduced with anticoagulant therapy. The patient verbalized understandings about these  possible complications and wishes to proceed with this procedure.        Farhat Reese M.D.  7/25/2023

## 2023-07-25 NOTE — OR NURSING
Patiet is alert , oriented x 4 not   denies any  chest pain , palpitations. Diagnostic procedure verified and consent signed .   Waiting for Md.   Bed on the lowest position with call bell at bedside.

## 2023-07-25 NOTE — ANESTHESIA PREPROCEDURE EVALUATION
Case: [id number] Date/Time: 07/25/23 0930    Procedure: EC-ELIANE W/O CONT WITH CONSCIOUS SEDATION-DR. MARTINEZ    Location: ELIANE/CARDIO/TILT / SURGERY [facility]    [facility]          Relevant Problems   NEURO   (positive) Transient ischemic attack      CARDIAC   (positive) Atrial fibrillation (HCC)   (positive) Carotid artery dissection (HCC)   (positive) Carotid occlusion, left   (positive) Essential hypertension       Physical Exam    Airway   Mallampati: II  TM distance: >3 FB  Neck ROM: full       Cardiovascular - normal exam  Rhythm: regular  Rate: normal  (-) murmur     Dental - normal exam           Pulmonary - normal exam  Breath sounds clear to auscultation     Abdominal    Neurological - normal exam                 Anesthesia Plan    ASA 3   ASA physical status 3 criteria: CVA or TIA - history (> 3 months)    Plan - MAC               Induction: intravenous    Postoperative Plan: Postoperative administration of opioids is intended.    Pertinent diagnostic labs and testing reviewed    Informed Consent:    Anesthetic plan and risks discussed with patient.    Use of blood products discussed with: patient whom consented to blood products.

## 2023-07-25 NOTE — OR NURSING
1039: Pt arrives to PACU asleep and calm. VSS.     1136: Pt denies pain or nausea. Report called to Aimee MONTOYA.

## 2023-07-25 NOTE — DISCHARGE INSTRUCTIONS
HOME CARE INSTRUCTIONS    ACTIVITY: Rest and take it easy for the first 24 hours.  A responsible adult is recommended to remain with you during that time.  It is normal to feel sleepy.  We encourage you to not do anything that requires balance, judgment or coordination.    FOR 24 HOURS DO NOT:  Drive, operate machinery or run household appliances.  Drink beer or alcoholic beverages.  Make important decisions or sign legal documents.    SPECIAL INSTRUCTIONS: Transesophageal Echocardiogram  Transesophageal echocardiogram (ELIANE) is a test that uses sound waves to take pictures of your heart. ELIANE is done by passing a small probe attached to a flexible tube down the part of the body that moves food from your mouth to your stomach (esophagus). The pictures give clear images of your heart. This can help your doctor see if there are problems with your heart.  Tell a doctor about:  Any allergies you have.  All medicines you are taking. This includes vitamins, herbs, eye drops, creams, and over-the-counter medicines.  Any problems you or family members have had with anesthetic medicines.  Any blood disorders you have.  Any surgeries you have had.  Any medical conditions you have.  Any swallowing problems.  Whether you have or have had a blockage in the part of the body that moves food from your mouth to your stomach.  Whether you are pregnant or may be pregnant.  What are the risks?  In general, this is a safe procedure. But, problems may occur, such as:  Damage to nearby structures or organs.  A tear in the part of the body that moves food from your mouth to your stomach.  Irregular heartbeat.  Hoarse voice or trouble swallowing.  Bleeding.  What happens before the procedure?  Medicines  Ask your doctor about changing or stopping:  Your normal medicines.  Vitamins, herbs, and supplements.  Over-the-counter medicines.  Do not take aspirin or ibuprofen unless you are told to.  General instructions  Follow instructions from your  doctor about what you cannot eat or drink.  You will take out any dentures or dental retainers.  Plan to have a responsible adult take you home from the hospital or clinic.  Plan to have a responsible adult care for you for the time you are told after you leave the hospital or clinic. This is important.  What happens during the procedure?    An IV will be put into one of your veins.  You may be given:  A sedative. This medicine helps you relax.  A medicine to numb the back of your throat. This may be sprayed or gargled.  Your blood pressure, heart rate, and breathing will be watched.  You may be asked to lie on your left side.  A bite block will be placed in your mouth. This keeps you from biting the tube.  The tip of the probe will be placed into the back of your mouth.  You will be asked to swallow.  Your doctor will take pictures of your heart.  The probe and bite block will be taken out after the test is done.  The procedure may vary among doctors and hospitals.  What can I expect after the procedure?  You will be monitored until you leave the hospital or clinic. This includes checking your blood pressure, heart rate, breathing rate, and blood oxygen level.  Your throat may feel sore and numb. This will get better over time. You will not be allowed to eat or drink until the numbness has gone away.  It is common to have a sore throat for a day or two.  It is up to you to get the results of your procedure. Ask how to get your results when they are ready.  Follow these instructions at home:  If you were given a sedative during your procedure, do not drive or use machines until your doctor says that it is safe.  Return to your normal activities when your doctor says that it is safe.  Keep all follow-up visits.  Summary  ELIANE is a test that uses sound waves to take pictures of your heart.  You will be given a medicine to help you relax.  Do not drive or use machines until your doctor says that it is safe.  This  information is not intended to replace advice given to you by your health care provider. Make sure you discuss any questions you have with your health care provider.  Document Revised: 08/31/2022 Document Reviewed: 08/10/2021  [employer] Patient Education © 2023 [employer]      DIET: To avoid nausea, slowly advance diet as tolerated, avoiding spicy or greasy foods for the first day.  Add more substantial food to your diet according to your physician's instructions.  Babies can be fed formula or breast milk as soon as they are hungry.  INCREASE FLUIDS AND FIBER TO AVOID CONSTIPATION.      MEDICATIONS: Resume taking daily medication.  Take prescribed pain medication with food.  If no medication is prescribed, you may take non-aspirin pain medication if needed.  PAIN MEDICATION CAN BE VERY CONSTIPATING.  Take a stool softener or laxative such as senokot, pericolace, or milk of magnesia if needed.      A follow-up appointment should be arranged with your doctor in 1-2 weeks; call to schedule.    You should CALL YOUR PHYSICIAN if you develop:  Fever greater than 101 degrees F.  Pain not relieved by medication, or persistent nausea or vomiting.  Excessive bleeding (blood soaking through dressing) or unexpected drainage from the wound.  Extreme redness or swelling around the incision site, drainage of pus or foul smelling drainage.  Inability to urinate or empty your bladder within 8 hours.  Problems with breathing or chest pain.    You should call 911 if you develop problems with breathing or chest pain.  If you are unable to contact your doctor or surgical center, you should go to the nearest emergency room or urgent care center.  Physician's telephone #: [phone]    MILD FLU-LIKE SYMPTOMS ARE NORMAL.  YOU MAY EXPERIENCE GENERALIZED MUSCLE ACHES, THROAT IRRITATION, HEADACHE AND/OR SOME NAUSEA.    If any questions arise, call your doctor.  If your doctor is not available, please feel free to call the Surgical Center  at [phone].  The Center is open Monday through Friday from 7AM to 7PM.      A registered nurse may call you a few days after your surgery to see how you are doing after your procedure.    You may also receive a survey in the mail within the next two weeks and we ask that you take a few moments to complete the survey and return it to us.  Our goal is to provide you with very good care and we value your comments.     Depression / Suicide Risk    As you are discharged from this [facility] facility, it is important to learn how to keep safe from harming yourself.    Recognize the warning signs:  Abrupt changes in personality, positive or negative- including increase in energy   Giving away possessions  Change in eating patterns- significant weight changes-  positive or negative  Change in sleeping patterns- unable to sleep or sleeping all the time   Unwillingness or inability to communicate  Depression  Unusual sadness, discouragement and loneliness  Talk of wanting to die  Neglect of personal appearance   Rebelliousness- reckless behavior  Withdrawal from people/activities they love  Confusion- inability to concentrate     If you or a loved one observes any of these behaviors or has concerns about self-harm, here's what you can do:  Talk about it- your feelings and reasons for harming yourself  Remove any means that you might use to hurt yourself (examples: pills, rope, extension cords, firearm)  Get professional help from the community (Mental Health, Substance Abuse, psychological counseling)  Do not be alone:Call your Safe Contact- someone whom you trust who will be there for you.  Call your local CRISIS HOTLINE 870-7766 or 521-348-0698  Call your local Children's Mobile Crisis Response Team [place] (557) 012-9083 or www.Expanite  Call the toll free National Suicide Prevention Hotlines   National Suicide Prevention Lifeline 243-003-FEGP (6974)  National Hope Line Network 800-AUTAKLY  (319-1382)    I acknowledge receipt and understanding of these Home Care instructions.

## 2023-07-25 NOTE — ANESTHESIA POSTPROCEDURE EVALUATION
Patient: Juan Luis Rolon    Procedure Summary     Date: 07/25/23 Room / Location: ELIANE/CARDIO/TILT / SURGERY [facility]    Anesthesia Start: 1020 Anesthesia Stop: 1045    Procedure: EC-ELIANE W/O CONT WITH CONSCIOUS SEDATION-DR. MARTINEZ Diagnosis:     Surgeons: Recoveryonly Surgery Responsible Provider: Darrel Kohler D.O.    Anesthesia Type: MAC ASA Status: 3          Final Anesthesia Type: MAC  Last vitals  BP   Blood Pressure : 107/63    Temp   36.6 °C (97.8 °F)    Pulse   66   Resp   (!) 22    SpO2   96 %      Anesthesia Post Evaluation    Patient location during evaluation: PACU  Patient participation: complete - patient participated  Level of consciousness: awake and alert    Airway patency: patent  Anesthetic complications: no  Cardiovascular status: hemodynamically stable  Respiratory status: acceptable  Hydration status: euvolemic    PONV: none          No notable events documented.     Nurse Pain Score: 0 (NPRS)

## 2023-07-25 NOTE — OR NURSING
Stable.  No shortness of breath, no bleeding, took some po water.  Verbalizes good understanding of discharge instructions.

## 2023-07-26 ENCOUNTER — PATIENT MESSAGE (OUTPATIENT)
Dept: CARDIOLOGY | Facility: MEDICAL CENTER | Age: 63
End: 2023-07-26
Payer: MEDICARE

## 2023-07-27 NOTE — PATIENT COMMUNICATION
Ranjith Garcia M.D.  to Christiana Blankenship R.N.     6/26/23 10:05 AM  Yes - she still needs a ELIANE in one month and after that can stop OAC     ELIANE shows appropriate watchman placement and leak of 2.8mm. Per protocol, and [facility] note, patient okay to discontinue anticoagulation and continue aspirin. MyChart response sent to patient, awaiting patient response and will follow up as needed.

## 2023-08-11 ENCOUNTER — DOCUMENTATION (OUTPATIENT)
Dept: VASCULAR LAB | Facility: MEDICAL CENTER | Age: 63
End: 2023-08-11
Payer: MEDICARE

## 2023-08-11 DIAGNOSIS — I77.71 CAROTID ARTERY DISSECTION (HCC): ICD-10-CM

## 2023-08-11 DIAGNOSIS — Z86.73 HX OF TIA (TRANSIENT ISCHEMIC ATTACK) AND STROKE: ICD-10-CM

## 2023-08-11 NOTE — PROGRESS NOTES
Orders placed for vascular surveillance imaging. Carotid duplex    Azuro[employer] message sent to pt to remind that they are due for their surveillance vascular imaging.         Christiana Blankenship R.N.   [facility]

## 2023-08-12 ENCOUNTER — APPOINTMENT (OUTPATIENT)
Dept: RADIOLOGY | Facility: MEDICAL CENTER | Age: 63
DRG: 038 | End: 2023-08-12
Attending: EMERGENCY MEDICINE
Payer: MEDICARE

## 2023-08-12 ENCOUNTER — HOSPITAL ENCOUNTER (INPATIENT)
Facility: MEDICAL CENTER | Age: 63
LOS: 3 days | DRG: 038 | End: 2023-08-15
Attending: EMERGENCY MEDICINE | Admitting: INTERNAL MEDICINE
Payer: MEDICARE

## 2023-08-12 ENCOUNTER — APPOINTMENT (OUTPATIENT)
Dept: RADIOLOGY | Facility: MEDICAL CENTER | Age: 63
DRG: 038 | End: 2023-08-12
Attending: PSYCHIATRY & NEUROLOGY
Payer: MEDICARE

## 2023-08-12 DIAGNOSIS — I63.9 ACUTE ISCHEMIC STROKE (HCC): ICD-10-CM

## 2023-08-12 DIAGNOSIS — I63.9 ACUTE CVA (CEREBROVASCULAR ACCIDENT) (HCC): ICD-10-CM

## 2023-08-12 DIAGNOSIS — N30.00 ACUTE CYSTITIS WITHOUT HEMATURIA: ICD-10-CM

## 2023-08-12 LAB
ABO GROUP BLD: NORMAL
ALBUMIN SERPL BCP-MCNC: 4 G/DL (ref 3.2–4.9)
ALBUMIN/GLOB SERPL: 1.7 G/DL
ALP SERPL-CCNC: 59 U/L (ref 30–99)
ALT SERPL-CCNC: 25 U/L (ref 2–50)
ANION GAP SERPL CALC-SCNC: 10 MMOL/L (ref 7–16)
APTT PPP: 26.5 SEC (ref 24.7–36)
AST SERPL-CCNC: 35 U/L (ref 12–45)
BASOPHILS # BLD AUTO: 0.1 % (ref 0–1.8)
BASOPHILS # BLD: 0.01 K/UL (ref 0–0.12)
BILIRUB SERPL-MCNC: 0.4 MG/DL (ref 0.1–1.5)
BLD GP AB SCN SERPL QL: NORMAL
BUN SERPL-MCNC: 12 MG/DL (ref 8–22)
CALCIUM ALBUM COR SERPL-MCNC: 9 MG/DL (ref 8.5–10.5)
CALCIUM SERPL-MCNC: 9 MG/DL (ref 8.5–10.5)
CHLORIDE SERPL-SCNC: 98 MMOL/L (ref 96–112)
CO2 SERPL-SCNC: 24 MMOL/L (ref 20–33)
CREAT SERPL-MCNC: 0.68 MG/DL (ref 0.5–1.4)
EOSINOPHIL # BLD AUTO: 0.02 K/UL (ref 0–0.51)
EOSINOPHIL NFR BLD: 0.2 % (ref 0–6.9)
ERYTHROCYTE [DISTWIDTH] IN BLOOD BY AUTOMATED COUNT: 40.2 FL (ref 35.9–50)
GFR SERPLBLD CREATININE-BSD FMLA CKD-EPI: 96 ML/MIN/1.73 M 2
GLOBULIN SER CALC-MCNC: 2.3 G/DL (ref 1.9–3.5)
GLUCOSE BLD STRIP.AUTO-MCNC: 118 MG/DL (ref 65–99)
GLUCOSE SERPL-MCNC: 117 MG/DL (ref 65–99)
HCT VFR BLD AUTO: 35.7 % (ref 37–47)
HGB BLD-MCNC: 12.4 G/DL (ref 12–16)
IMM GRANULOCYTES # BLD AUTO: 0.06 K/UL (ref 0–0.11)
IMM GRANULOCYTES NFR BLD AUTO: 0.6 % (ref 0–0.9)
INR PPP: 1.06 (ref 0.87–1.13)
LYMPHOCYTES # BLD AUTO: 0.66 K/UL (ref 1–4.8)
LYMPHOCYTES NFR BLD: 7 % (ref 22–41)
MCH RBC QN AUTO: 32.1 PG (ref 27–33)
MCHC RBC AUTO-ENTMCNC: 34.7 G/DL (ref 32.2–35.5)
MCV RBC AUTO: 92.5 FL (ref 81.4–97.8)
MONOCYTES # BLD AUTO: 0.53 K/UL (ref 0–0.85)
MONOCYTES NFR BLD AUTO: 5.6 % (ref 0–13.4)
NEUTROPHILS # BLD AUTO: 8.18 K/UL (ref 1.82–7.42)
NEUTROPHILS NFR BLD: 86.5 % (ref 44–72)
NRBC # BLD AUTO: 0 K/UL
NRBC BLD-RTO: 0 /100 WBC (ref 0–0.2)
PLATELET # BLD AUTO: 293 K/UL (ref 164–446)
PMV BLD AUTO: 9 FL (ref 9–12.9)
POTASSIUM SERPL-SCNC: 4 MMOL/L (ref 3.6–5.5)
PROT SERPL-MCNC: 6.3 G/DL (ref 6–8.2)
PROTHROMBIN TIME: 13.7 SEC (ref 12–14.6)
RBC # BLD AUTO: 3.86 M/UL (ref 4.2–5.4)
RH BLD: NORMAL
SODIUM SERPL-SCNC: 132 MMOL/L (ref 135–145)
TROPONIN T SERPL-MCNC: 22 NG/L (ref 6–19)
WBC # BLD AUTO: 9.5 K/UL (ref 4.8–10.8)

## 2023-08-12 PROCEDURE — 0042T CT-CEREBRAL PERFUSION ANALYSIS: CPT

## 2023-08-12 PROCEDURE — 99223 1ST HOSP IP/OBS HIGH 75: CPT | Performed by: PSYCHIATRY & NEUROLOGY

## 2023-08-12 PROCEDURE — 700111 HCHG RX REV CODE 636 W/ 250 OVERRIDE (IP): Mod: JZ | Performed by: INTERNAL MEDICINE

## 2023-08-12 PROCEDURE — 70450 CT HEAD/BRAIN W/O DYE: CPT

## 2023-08-12 PROCEDURE — 84484 ASSAY OF TROPONIN QUANT: CPT

## 2023-08-12 PROCEDURE — 96374 THER/PROPH/DIAG INJ IV PUSH: CPT

## 2023-08-12 PROCEDURE — 70498 CT ANGIOGRAPHY NECK: CPT

## 2023-08-12 PROCEDURE — 86900 BLOOD TYPING SEROLOGIC ABO: CPT

## 2023-08-12 PROCEDURE — 86850 RBC ANTIBODY SCREEN: CPT

## 2023-08-12 PROCEDURE — 700105 HCHG RX REV CODE 258: Mod: JZ | Performed by: INTERNAL MEDICINE

## 2023-08-12 PROCEDURE — 037K3ZZ DILATION OF RIGHT INTERNAL CAROTID ARTERY, PERCUTANEOUS APPROACH: ICD-10-PCS | Performed by: RADIOLOGY

## 2023-08-12 PROCEDURE — 770022 HCHG ROOM/CARE - ICU (200)

## 2023-08-12 PROCEDURE — 93005 ELECTROCARDIOGRAM TRACING: CPT

## 2023-08-12 PROCEDURE — 85025 COMPLETE CBC W/AUTO DIFF WBC: CPT

## 2023-08-12 PROCEDURE — 700111 HCHG RX REV CODE 636 W/ 250 OVERRIDE (IP)

## 2023-08-12 PROCEDURE — 86901 BLOOD TYPING SEROLOGIC RH(D): CPT

## 2023-08-12 PROCEDURE — 700111 HCHG RX REV CODE 636 W/ 250 OVERRIDE (IP): Mod: JZ | Performed by: RADIOLOGY

## 2023-08-12 PROCEDURE — 83036 HEMOGLOBIN GLYCOSYLATED A1C: CPT

## 2023-08-12 PROCEDURE — 99291 CRITICAL CARE FIRST HOUR: CPT | Performed by: INTERNAL MEDICINE

## 2023-08-12 PROCEDURE — 70496 CT ANGIOGRAPHY HEAD: CPT

## 2023-08-12 PROCEDURE — 51798 US URINE CAPACITY MEASURE: CPT

## 2023-08-12 PROCEDURE — 36415 COLL VENOUS BLD VENIPUNCTURE: CPT

## 2023-08-12 PROCEDURE — 700117 HCHG RX CONTRAST REV CODE 255

## 2023-08-12 PROCEDURE — 85730 THROMBOPLASTIN TIME PARTIAL: CPT

## 2023-08-12 PROCEDURE — 85610 PROTHROMBIN TIME: CPT

## 2023-08-12 PROCEDURE — 99291 CRITICAL CARE FIRST HOUR: CPT

## 2023-08-12 PROCEDURE — 700101 HCHG RX REV CODE 250: Performed by: INTERNAL MEDICINE

## 2023-08-12 PROCEDURE — 80053 COMPREHEN METABOLIC PANEL: CPT

## 2023-08-12 PROCEDURE — 71045 X-RAY EXAM CHEST 1 VIEW: CPT

## 2023-08-12 PROCEDURE — 4410345 IR-THROMBO MECHANICAL ARTERY,INIT

## 2023-08-12 PROCEDURE — 82962 GLUCOSE BLOOD TEST: CPT

## 2023-08-12 RX ORDER — ACETAMINOPHEN 325 MG/1
650 TABLET ORAL EVERY 6 HOURS PRN
Status: DISCONTINUED | OUTPATIENT
Start: 2023-08-12 | End: 2023-08-15 | Stop reason: HOSPADM

## 2023-08-12 RX ORDER — SODIUM CHLORIDE 9 MG/ML
INJECTION, SOLUTION INTRAVENOUS CONTINUOUS
Status: ACTIVE | OUTPATIENT
Start: 2023-08-12 | End: 2023-08-13

## 2023-08-12 RX ORDER — ACETAMINOPHEN 650 MG/1
650 SUPPOSITORY RECTAL EVERY 4 HOURS PRN
Status: DISCONTINUED | OUTPATIENT
Start: 2023-08-12 | End: 2023-08-15 | Stop reason: HOSPADM

## 2023-08-12 RX ORDER — MIDAZOLAM HYDROCHLORIDE 1 MG/ML
INJECTION INTRAMUSCULAR; INTRAVENOUS
Status: DISPENSED
Start: 2023-08-12 | End: 2023-08-13

## 2023-08-12 RX ORDER — EPTIFIBATIDE 0.75 MG/ML
1 INJECTION, SOLUTION INTRAVENOUS CONTINUOUS
Status: DISPENSED | OUTPATIENT
Start: 2023-08-12 | End: 2023-08-13

## 2023-08-12 RX ORDER — PHENYLEPHRINE HCL IN 0.9% NACL 0.5 MG/5ML
SYRINGE (ML) INTRAVENOUS
Status: DISPENSED
Start: 2023-08-12 | End: 2023-08-13

## 2023-08-12 RX ORDER — ONDANSETRON 4 MG/1
4 TABLET, ORALLY DISINTEGRATING ORAL EVERY 4 HOURS PRN
Status: DISCONTINUED | OUTPATIENT
Start: 2023-08-12 | End: 2023-08-15 | Stop reason: HOSPADM

## 2023-08-12 RX ORDER — BISACODYL 10 MG
10 SUPPOSITORY, RECTAL RECTAL
Status: DISCONTINUED | OUTPATIENT
Start: 2023-08-12 | End: 2023-08-15 | Stop reason: HOSPADM

## 2023-08-12 RX ORDER — POLYETHYLENE GLYCOL 3350 17 G/17G
1 POWDER, FOR SOLUTION ORAL
Status: DISCONTINUED | OUTPATIENT
Start: 2023-08-12 | End: 2023-08-15 | Stop reason: HOSPADM

## 2023-08-12 RX ORDER — NOREPINEPHRINE BITARTRATE 0.03 MG/ML
0-1 INJECTION, SOLUTION INTRAVENOUS CONTINUOUS
Status: DISCONTINUED | OUTPATIENT
Start: 2023-08-12 | End: 2023-08-14

## 2023-08-12 RX ORDER — EPTIFIBATIDE 2 MG/ML
INJECTION, SOLUTION INTRAVENOUS
Status: COMPLETED
Start: 2023-08-12 | End: 2023-08-12

## 2023-08-12 RX ORDER — EPTIFIBATIDE 2 MG/ML
180 INJECTION, SOLUTION INTRAVENOUS ONCE
Status: COMPLETED | OUTPATIENT
Start: 2023-08-12 | End: 2023-08-12

## 2023-08-12 RX ORDER — DEXTROSE MONOHYDRATE 25 G/50ML
25 INJECTION, SOLUTION INTRAVENOUS
Status: DISCONTINUED | OUTPATIENT
Start: 2023-08-12 | End: 2023-08-13

## 2023-08-12 RX ORDER — AMOXICILLIN 250 MG
2 CAPSULE ORAL 2 TIMES DAILY
Status: DISCONTINUED | OUTPATIENT
Start: 2023-08-12 | End: 2023-08-15 | Stop reason: HOSPADM

## 2023-08-12 RX ORDER — ONDANSETRON 2 MG/ML
4 INJECTION INTRAMUSCULAR; INTRAVENOUS EVERY 4 HOURS PRN
Status: DISCONTINUED | OUTPATIENT
Start: 2023-08-12 | End: 2023-08-15 | Stop reason: HOSPADM

## 2023-08-12 RX ADMIN — NICARDIPINE HYDROCHLORIDE 2.5 MG/HR: 25 INJECTION, SOLUTION INTRAVENOUS at 23:08

## 2023-08-12 RX ADMIN — EPTIFIBATIDE 14220 MCG: 2 INJECTION, SOLUTION INTRAVENOUS at 19:44

## 2023-08-12 RX ADMIN — FENTANYL CITRATE 25 MCG: 50 INJECTION, SOLUTION INTRAMUSCULAR; INTRAVENOUS at 21:38

## 2023-08-12 RX ADMIN — IOHEXOL 40 ML: 350 INJECTION, SOLUTION INTRAVENOUS at 18:30

## 2023-08-12 RX ADMIN — EPTIFIBATIDE 1 MCG/KG/MIN: 0.75 INJECTION, SOLUTION INTRAVENOUS at 20:26

## 2023-08-12 RX ADMIN — IOHEXOL 80 ML: 350 INJECTION, SOLUTION INTRAVENOUS at 18:30

## 2023-08-12 RX ADMIN — SODIUM CHLORIDE 1000 ML: 9 INJECTION, SOLUTION INTRAVENOUS at 22:49

## 2023-08-12 RX ADMIN — SODIUM CHLORIDE: 9 INJECTION, SOLUTION INTRAVENOUS at 20:33

## 2023-08-12 ASSESSMENT — PAIN DESCRIPTION - PAIN TYPE
TYPE: ACUTE PAIN
TYPE: ACUTE PAIN
TYPE: ACUTE PAIN;SURGICAL PAIN
TYPE: SURGICAL PAIN;ACUTE PAIN

## 2023-08-12 ASSESSMENT — FIBROSIS 4 INDEX: FIB4 SCORE: 1.48

## 2023-08-13 ENCOUNTER — APPOINTMENT (OUTPATIENT)
Dept: CARDIOLOGY | Facility: MEDICAL CENTER | Age: 63
DRG: 038 | End: 2023-08-13
Attending: INTERNAL MEDICINE
Payer: MEDICARE

## 2023-08-13 ENCOUNTER — APPOINTMENT (OUTPATIENT)
Dept: RADIOLOGY | Facility: MEDICAL CENTER | Age: 63
DRG: 038 | End: 2023-08-13
Attending: INTERNAL MEDICINE
Payer: MEDICARE

## 2023-08-13 PROBLEM — E87.6 HYPOKALEMIA: Status: ACTIVE | Noted: 2023-08-13

## 2023-08-13 PROBLEM — E83.42 HYPOMAGNESEMIA: Status: ACTIVE | Noted: 2023-08-13

## 2023-08-13 LAB
ABO + RH BLD: NORMAL
ALBUMIN SERPL BCP-MCNC: 4 G/DL (ref 3.2–4.9)
ALBUMIN/GLOB SERPL: 1.8 G/DL
ALP SERPL-CCNC: 54 U/L (ref 30–99)
ALT SERPL-CCNC: 24 U/L (ref 2–50)
ANION GAP SERPL CALC-SCNC: 12 MMOL/L (ref 7–16)
AST SERPL-CCNC: 30 U/L (ref 12–45)
BASOPHILS # BLD AUTO: 0.2 % (ref 0–1.8)
BASOPHILS # BLD: 0.01 K/UL (ref 0–0.12)
BILIRUB SERPL-MCNC: 0.6 MG/DL (ref 0.1–1.5)
BUN SERPL-MCNC: 9 MG/DL (ref 8–22)
CALCIUM ALBUM COR SERPL-MCNC: 9.3 MG/DL (ref 8.5–10.5)
CALCIUM SERPL-MCNC: 9.3 MG/DL (ref 8.5–10.5)
CHLORIDE SERPL-SCNC: 103 MMOL/L (ref 96–112)
CO2 SERPL-SCNC: 24 MMOL/L (ref 20–33)
CREAT SERPL-MCNC: 0.66 MG/DL (ref 0.5–1.4)
EKG IMPRESSION: NORMAL
EOSINOPHIL # BLD AUTO: 0.04 K/UL (ref 0–0.51)
EOSINOPHIL NFR BLD: 0.7 % (ref 0–6.9)
ERYTHROCYTE [DISTWIDTH] IN BLOOD BY AUTOMATED COUNT: 41.6 FL (ref 35.9–50)
GFR SERPLBLD CREATININE-BSD FMLA CKD-EPI: 97 ML/MIN/1.73 M 2
GLOBULIN SER CALC-MCNC: 2.2 G/DL (ref 1.9–3.5)
GLUCOSE BLD STRIP.AUTO-MCNC: 107 MG/DL (ref 65–99)
GLUCOSE BLD STRIP.AUTO-MCNC: 120 MG/DL (ref 65–99)
GLUCOSE SERPL-MCNC: 114 MG/DL (ref 65–99)
HCT VFR BLD AUTO: 33.6 % (ref 37–47)
HGB BLD-MCNC: 11.5 G/DL (ref 12–16)
IMM GRANULOCYTES # BLD AUTO: 0.01 K/UL (ref 0–0.11)
IMM GRANULOCYTES NFR BLD AUTO: 0.2 % (ref 0–0.9)
LV EJECT FRACT  99904: 55
LYMPHOCYTES # BLD AUTO: 1.14 K/UL (ref 1–4.8)
LYMPHOCYTES NFR BLD: 21 % (ref 22–41)
MAGNESIUM SERPL-MCNC: 1.8 MG/DL (ref 1.5–2.5)
MCH RBC QN AUTO: 32.3 PG (ref 27–33)
MCHC RBC AUTO-ENTMCNC: 34.2 G/DL (ref 32.2–35.5)
MCV RBC AUTO: 94.4 FL (ref 81.4–97.8)
MONOCYTES # BLD AUTO: 0.6 K/UL (ref 0–0.85)
MONOCYTES NFR BLD AUTO: 11.1 % (ref 0–13.4)
NEUTROPHILS # BLD AUTO: 3.62 K/UL (ref 1.82–7.42)
NEUTROPHILS NFR BLD: 66.8 % (ref 44–72)
NRBC # BLD AUTO: 0 K/UL
NRBC BLD-RTO: 0 /100 WBC (ref 0–0.2)
PHOSPHATE SERPL-MCNC: 3.2 MG/DL (ref 2.5–4.5)
PLATELET # BLD AUTO: 264 K/UL (ref 164–446)
PMV BLD AUTO: 9.1 FL (ref 9–12.9)
POTASSIUM SERPL-SCNC: 3.4 MMOL/L (ref 3.6–5.5)
PROT SERPL-MCNC: 6.2 G/DL (ref 6–8.2)
RBC # BLD AUTO: 3.56 M/UL (ref 4.2–5.4)
SODIUM SERPL-SCNC: 139 MMOL/L (ref 135–145)
WBC # BLD AUTO: 5.4 K/UL (ref 4.8–10.8)

## 2023-08-13 PROCEDURE — 700111 HCHG RX REV CODE 636 W/ 250 OVERRIDE (IP): Mod: JZ | Performed by: RADIOLOGY

## 2023-08-13 PROCEDURE — 84100 ASSAY OF PHOSPHORUS: CPT

## 2023-08-13 PROCEDURE — 700102 HCHG RX REV CODE 250 W/ 637 OVERRIDE(OP): Performed by: NURSE PRACTITIONER

## 2023-08-13 PROCEDURE — 700102 HCHG RX REV CODE 250 W/ 637 OVERRIDE(OP): Performed by: INTERNAL MEDICINE

## 2023-08-13 PROCEDURE — A9270 NON-COVERED ITEM OR SERVICE: HCPCS | Performed by: NURSE PRACTITIONER

## 2023-08-13 PROCEDURE — A9270 NON-COVERED ITEM OR SERVICE: HCPCS | Performed by: INTERNAL MEDICINE

## 2023-08-13 PROCEDURE — 99291 CRITICAL CARE FIRST HOUR: CPT | Performed by: INTERNAL MEDICINE

## 2023-08-13 PROCEDURE — 770022 HCHG ROOM/CARE - ICU (200)

## 2023-08-13 PROCEDURE — 92610 EVALUATE SWALLOWING FUNCTION: CPT

## 2023-08-13 PROCEDURE — 80053 COMPREHEN METABOLIC PANEL: CPT

## 2023-08-13 PROCEDURE — 83735 ASSAY OF MAGNESIUM: CPT

## 2023-08-13 PROCEDURE — 93308 TTE F-UP OR LMTD: CPT | Mod: 26 | Performed by: INTERNAL MEDICINE

## 2023-08-13 PROCEDURE — 93010 ELECTROCARDIOGRAM REPORT: CPT | Performed by: INTERNAL MEDICINE

## 2023-08-13 PROCEDURE — 70450 CT HEAD/BRAIN W/O DYE: CPT

## 2023-08-13 PROCEDURE — 82962 GLUCOSE BLOOD TEST: CPT

## 2023-08-13 PROCEDURE — 700105 HCHG RX REV CODE 258: Mod: JZ | Performed by: INTERNAL MEDICINE

## 2023-08-13 PROCEDURE — 700101 HCHG RX REV CODE 250: Performed by: INTERNAL MEDICINE

## 2023-08-13 PROCEDURE — 85025 COMPLETE CBC W/AUTO DIFF WBC: CPT

## 2023-08-13 PROCEDURE — 93308 TTE F-UP OR LMTD: CPT

## 2023-08-13 PROCEDURE — 99232 SBSQ HOSP IP/OBS MODERATE 35: CPT | Performed by: PSYCHIATRY & NEUROLOGY

## 2023-08-13 PROCEDURE — 700111 HCHG RX REV CODE 636 W/ 250 OVERRIDE (IP): Mod: JZ | Performed by: INTERNAL MEDICINE

## 2023-08-13 RX ORDER — HYDROMORPHONE HYDROCHLORIDE 1 MG/ML
.25-.5 INJECTION, SOLUTION INTRAMUSCULAR; INTRAVENOUS; SUBCUTANEOUS
Status: DISCONTINUED | OUTPATIENT
Start: 2023-08-13 | End: 2023-08-15 | Stop reason: HOSPADM

## 2023-08-13 RX ORDER — ASPIRIN 325 MG
325 TABLET ORAL ONCE
Status: COMPLETED | OUTPATIENT
Start: 2023-08-13 | End: 2023-08-13

## 2023-08-13 RX ORDER — HYDRALAZINE HYDROCHLORIDE 20 MG/ML
10 INJECTION INTRAMUSCULAR; INTRAVENOUS EVERY 4 HOURS PRN
Status: DISCONTINUED | OUTPATIENT
Start: 2023-08-13 | End: 2023-08-15 | Stop reason: HOSPADM

## 2023-08-13 RX ORDER — CHOLECALCIFEROL (VITAMIN D3) 125 MCG
10 CAPSULE ORAL NIGHTLY
Status: DISCONTINUED | OUTPATIENT
Start: 2023-08-13 | End: 2023-08-15 | Stop reason: HOSPADM

## 2023-08-13 RX ORDER — ASPIRIN 81 MG/1
81 TABLET, CHEWABLE ORAL DAILY
Status: DISCONTINUED | OUTPATIENT
Start: 2023-08-14 | End: 2023-08-15 | Stop reason: HOSPADM

## 2023-08-13 RX ORDER — MAGNESIUM SULFATE HEPTAHYDRATE 40 MG/ML
2 INJECTION, SOLUTION INTRAVENOUS ONCE
Status: COMPLETED | OUTPATIENT
Start: 2023-08-13 | End: 2023-08-13

## 2023-08-13 RX ORDER — LABETALOL HYDROCHLORIDE 5 MG/ML
10 INJECTION, SOLUTION INTRAVENOUS EVERY 4 HOURS PRN
Status: DISPENSED | OUTPATIENT
Start: 2023-08-13 | End: 2023-08-14

## 2023-08-13 RX ORDER — POTASSIUM CHLORIDE 7.45 MG/ML
10 INJECTION INTRAVENOUS
Status: COMPLETED | OUTPATIENT
Start: 2023-08-13 | End: 2023-08-13

## 2023-08-13 RX ADMIN — POTASSIUM CHLORIDE 10 MEQ: 7.46 INJECTION, SOLUTION INTRAVENOUS at 12:43

## 2023-08-13 RX ADMIN — NICARDIPINE HYDROCHLORIDE 2.5 MG/HR: 25 INJECTION, SOLUTION INTRAVENOUS at 10:05

## 2023-08-13 RX ADMIN — ACETAMINOPHEN 650 MG: 325 TABLET, FILM COATED ORAL at 23:16

## 2023-08-13 RX ADMIN — Medication 10 MG: at 21:52

## 2023-08-13 RX ADMIN — EPTIFIBATIDE 1 MCG/KG/MIN: 0.75 INJECTION, SOLUTION INTRAVENOUS at 08:24

## 2023-08-13 RX ADMIN — NICARDIPINE HYDROCHLORIDE 5 MG/HR: 25 INJECTION, SOLUTION INTRAVENOUS at 20:19

## 2023-08-13 RX ADMIN — POTASSIUM CHLORIDE 10 MEQ: 7.46 INJECTION, SOLUTION INTRAVENOUS at 08:57

## 2023-08-13 RX ADMIN — ASPIRIN 325 MG: 325 TABLET ORAL at 14:40

## 2023-08-13 RX ADMIN — POTASSIUM CHLORIDE 10 MEQ: 7.46 INJECTION, SOLUTION INTRAVENOUS at 11:27

## 2023-08-13 RX ADMIN — MAGNESIUM SULFATE HEPTAHYDRATE 2 G: 2 INJECTION, SOLUTION INTRAVENOUS at 08:50

## 2023-08-13 RX ADMIN — TICAGRELOR 180 MG: 90 TABLET ORAL at 14:40

## 2023-08-13 RX ADMIN — POTASSIUM CHLORIDE 10 MEQ: 7.46 INJECTION, SOLUTION INTRAVENOUS at 10:23

## 2023-08-13 ASSESSMENT — COGNITIVE AND FUNCTIONAL STATUS - GENERAL
SUGGESTED CMS G CODE MODIFIER MOBILITY: CK
CLIMB 3 TO 5 STEPS WITH RAILING: A LITTLE
DRESSING REGULAR LOWER BODY CLOTHING: A LITTLE
MOVING FROM LYING ON BACK TO SITTING ON SIDE OF FLAT BED: A LITTLE
SUGGESTED CMS G CODE MODIFIER DAILY ACTIVITY: CJ
STANDING UP FROM CHAIR USING ARMS: A LITTLE
HELP NEEDED FOR BATHING: A LITTLE
WALKING IN HOSPITAL ROOM: A LITTLE
TOILETING: A LITTLE
MOVING TO AND FROM BED TO CHAIR: A LITTLE
MOBILITY SCORE: 19
DAILY ACTIVITIY SCORE: 21

## 2023-08-13 ASSESSMENT — PAIN DESCRIPTION - PAIN TYPE
TYPE: ACUTE PAIN

## 2023-08-13 ASSESSMENT — FIBROSIS 4 INDEX
FIB4 SCORE: 1.51
FIB4 SCORE: 1.51

## 2023-08-13 ASSESSMENT — LIFESTYLE VARIABLES
HAVE YOU EVER FELT YOU SHOULD CUT DOWN ON YOUR DRINKING: NO
EVER HAD A DRINK FIRST THING IN THE MORNING TO STEADY YOUR NERVES TO GET RID OF A HANGOVER: NO
TOTAL SCORE: 0
AVERAGE NUMBER OF DAYS PER WEEK YOU HAVE A DRINK CONTAINING ALCOHOL: 4
EVER FELT BAD OR GUILTY ABOUT YOUR DRINKING: NO
HAVE PEOPLE ANNOYED YOU BY CRITICIZING YOUR DRINKING: NO
ALCOHOL_USE: YES
DOES PATIENT WANT TO STOP DRINKING: NO
ON A TYPICAL DAY WHEN YOU DRINK ALCOHOL HOW MANY DRINKS DO YOU HAVE: 1
HOW MANY TIMES IN THE PAST YEAR HAVE YOU HAD 5 OR MORE DRINKS IN A DAY: 0
TOTAL SCORE: 0
TOTAL SCORE: 0
CONSUMPTION TOTAL: NEGATIVE

## 2023-08-13 ASSESSMENT — PATIENT HEALTH QUESTIONNAIRE - PHQ9
2. FEELING DOWN, DEPRESSED, IRRITABLE, OR HOPELESS: NOT AT ALL
SUM OF ALL RESPONSES TO PHQ9 QUESTIONS 1 AND 2: 0
1. LITTLE INTEREST OR PLEASURE IN DOING THINGS: NOT AT ALL

## 2023-08-13 NOTE — PROGRESS NOTES
12-hour chart check complete.    Monitor Summary  Rhythm: sinus rhythm, sinus tachycardia  Rate: 70s-110s  Ectopy: rare PVCs  Measurements: .12/.06/.40

## 2023-08-13 NOTE — ASSESSMENT & PLAN NOTE
Acute right MCA ischemic stroke  CTA with critical right ICA stenosis with right M1 occlusion as well as occlusion of the left ICA  She did not received tenecteplase  S/P emergent IR intervention with right ICA angioplasty - subsequent thrombus in right M2 and distal right A1 segments  On Integrilin infusion - load with aspirin and ticagrelor and stop Integrilin infusion after appropriate bridge time  Strict blood pressure control with systolic blood pressure goal of 120-160  Nicardipine -> oral meds  Neuro checks every 4 hours  Echocardiogram, lipid panel, glycohemoglobin  MRI brain pending

## 2023-08-13 NOTE — CONSULTS
Critical Care Consultation    Date of consult: 8/12/2023    Referring Physician  Nehemias Richard, *    Reason for Consultation  Acute CVA - R ICA/M1 thrombus     History of Presenting Illness  63 y.o. female with a history of Afib s/p ablation by Jose 6/23/23, HTN, HLD, TIAs who presented 8/12/2023 with L sided weakness. Reportedly her symptoms began at 1530 and were associated with a HA. NIHSS 7; CTH without hemorrhage, CTA with R ICA/M1 thrombus. IV TNK was not delivered; she was taken for emergent thrombectomy. She states she was in her prior state of health, reports medication compliance; no infectious s/s. Critical care was consulted to manage her care following emergent thrombectomy.       Code Status  Full Code    Review of Systems  Review of Systems   Unable to perform ROS: Critical illness       Past Medical History   has a past medical history of Arrhythmia (12/23/2022), Hypertension, and Stroke (HCC) (5/11/2011).    Surgical History   has no past surgical history on file.    Family History  family history is not on file.    Social History   reports that she has never smoked. She has never used smokeless tobacco. She reports current alcohol use of about 3.5 oz of alcohol per week. She reports that she does not use drugs.    Medications  Home Medications       Reviewed by Malik Marks R.N. (Registered Nurse) on 08/12/23 at 1814  Med List Status: Not Addressed     Medication Last Dose Status   amLODIPine (NORVASC) 2.5 MG Tab  Active   aspirin (ASA) 81 MG Chew Tab chewable tablet  Active   atenolol (TENORMIN) 50 MG Tab  Active   colchicine (COLCRYS) 0.6 MG Tab  Active   docosahexanoic acid (OMEGA 3 FA) 1000 MG CAPS  Active   L-Lysine 500 MG Tab  Active   losartan (COZAAR) 25 MG Tab  Active   spironolactone/hctz (ALDACTAZIDE) 25-25 MG Tab  Active   terazosin (HYTRIN) 1 MG Cap  Active   tretinoin (RETIN-A) 0.025 % cream  Active                  Current Facility-Administered Medications   Medication  Dose Route Frequency Provider Last Rate Last Admin    FENTANYL CITRATE (PF) 0.05 MG/ML INJ SOLN (WRAPPED)             MIDAZOLAM HCL 2 MG/2ML INJ SOLN (WRAPPER)             PHENYLEPHRINE HCL-NACL 1-0.9 MG/10ML-% IV SOSY             acetaminophen (Tylenol) tablet 650 mg  650 mg Oral Q6HRS PRN Kenneth Owens M.D.        ondansetron (Zofran) syringe/vial injection 4 mg  4 mg Intravenous Q4HRS PRN Kenneth Owens M.D.        ondansetron (Zofran ODT) dispertab 4 mg  4 mg Oral Q4HRS PRN Kenneth Owens M.D.        senna-docusate (Pericolace Or Senokot S) 8.6-50 MG per tablet 2 Tablet  2 Tablet Oral BID Kenneth Owens M.D.        And    polyethylene glycol/lytes (Miralax) PACKET 1 Packet  1 Packet Oral QDAY PRN Kenneth Owens M.D.        And    magnesium hydroxide (Milk Of Magnesia) suspension 30 mL  30 mL Oral QDAY PRN Kenneth Owens M.D.        And    bisacodyl (Dulcolax) suppository 10 mg  10 mg Rectal QDAY PRN Kenneth Owens M.D.        insulin regular (HumuLIN R,NovoLIN R) injection  1-6 Units Subcutaneous Q6HRS Kenneth Owens M.D.        And    dextrose 50% (D50W) injection 25 g  25 g Intravenous Q15 MIN PRN Kenneth Owens M.D.        NS infusion   Intravenous Continuous Kenneth Owens M.D.        iohexol (OMNIPAQUE) 300 mg/mL  90 mL Intra-arterial Once Yariel Sanders M.D.        eptifibatide 0.75 mg/mL (Integrilin) infusion  1 mcg/kg/min Intravenous Continuous Yariel Sanders M.D. 6.3 mL/hr at 08/12/23 2026 1 mcg/kg/min at 08/12/23 2026    norepinephrine (Levophed) 8 mg in 250 mL NS infusion (premix)  0-1 mcg/kg/min (Ideal) Intravenous Continuous Kenneth Owens M.D.        niCARdipine (Cardene) 25 mg in  mL Standard Infusion  0-15 mg/hr Intravenous Continuous Kenneth D Owens, M.D.           Allergies  Allergies   Allergen Reactions    Atorvastatin     Codeine Vomiting    Morphine Vomiting    Plavix [Clopidogrel Bisulfate] Hives    Warfarin Hives       Vital Signs last 24  hours  Temp:  [36.7 °C (98.1 °F)] 36.7 °C (98.1 °F)  Pulse:  [] 74  Resp:  [15-36] 18  BP: (152-187)/() 152/101  SpO2:  [95 %-100 %] 97 %    Physical Exam  Physical Exam  Vitals reviewed.   HENT:      Head: Normocephalic and atraumatic.      Right Ear: External ear normal.      Left Ear: External ear normal.      Nose: Nose normal.      Mouth/Throat:      Mouth: Mucous membranes are moist.   Eyes:      Pupils: Pupils are equal, round, and reactive to light.   Cardiovascular:      Rate and Rhythm: Normal rate and regular rhythm.      Pulses: Normal pulses.   Pulmonary:      Effort: Pulmonary effort is normal. No respiratory distress.   Abdominal:      General: Abdomen is flat. There is no distension.      Palpations: Abdomen is soft.      Tenderness: There is no abdominal tenderness. There is no guarding or rebound.   Musculoskeletal:      Right lower leg: No edema.      Left lower leg: No edema.   Skin:     General: Skin is warm and dry.      Capillary Refill: Capillary refill takes less than 2 seconds.   Neurological:      Mental Status: She is alert.      Comments: Awake, alert, clear speech, R gaze preference, L facial droop; normal movement RUE/RLE. LUE/LLE are briefly antigravity         Fluids  No intake or output data in the 24 hours ending 08/12/23 2029    Laboratory  Recent Results (from the past 48 hour(s))   CBC WITH DIFFERENTIAL    Collection Time: 08/12/23  6:17 PM   Result Value Ref Range    WBC 9.5 4.8 - 10.8 K/uL    RBC 3.86 (L) 4.20 - 5.40 M/uL    Hemoglobin 12.4 12.0 - 16.0 g/dL    Hematocrit 35.7 (L) 37.0 - 47.0 %    MCV 92.5 81.4 - 97.8 fL    MCH 32.1 27.0 - 33.0 pg    MCHC 34.7 32.2 - 35.5 g/dL    RDW 40.2 35.9 - 50.0 fL    Platelet Count 293 164 - 446 K/uL    MPV 9.0 9.0 - 12.9 fL    Neutrophils-Polys 86.50 (H) 44.00 - 72.00 %    Lymphocytes 7.00 (L) 22.00 - 41.00 %    Monocytes 5.60 0.00 - 13.40 %    Eosinophils 0.20 0.00 - 6.90 %    Basophils 0.10 0.00 - 1.80 %    Immature  Granulocytes 0.60 0.00 - 0.90 %    Nucleated RBC 0.00 0.00 - 0.20 /100 WBC    Neutrophils (Absolute) 8.18 (H) 1.82 - 7.42 K/uL    Lymphs (Absolute) 0.66 (L) 1.00 - 4.80 K/uL    Monos (Absolute) 0.53 0.00 - 0.85 K/uL    Eos (Absolute) 0.02 0.00 - 0.51 K/uL    Baso (Absolute) 0.01 0.00 - 0.12 K/uL    Immature Granulocytes (abs) 0.06 0.00 - 0.11 K/uL    NRBC (Absolute) 0.00 K/uL   COMP METABOLIC PANEL    Collection Time: 08/12/23  6:17 PM   Result Value Ref Range    Sodium 132 (L) 135 - 145 mmol/L    Potassium 4.0 3.6 - 5.5 mmol/L    Chloride 98 96 - 112 mmol/L    Co2 24 20 - 33 mmol/L    Anion Gap 10.0 7.0 - 16.0    Glucose 117 (H) 65 - 99 mg/dL    Bun 12 8 - 22 mg/dL    Creatinine 0.68 0.50 - 1.40 mg/dL    Calcium 9.0 8.5 - 10.5 mg/dL    Correct Calcium 9.0 8.5 - 10.5 mg/dL    AST(SGOT) 35 12 - 45 U/L    ALT(SGPT) 25 2 - 50 U/L    Alkaline Phosphatase 59 30 - 99 U/L    Total Bilirubin 0.4 0.1 - 1.5 mg/dL    Albumin 4.0 3.2 - 4.9 g/dL    Total Protein 6.3 6.0 - 8.2 g/dL    Globulin 2.3 1.9 - 3.5 g/dL    A-G Ratio 1.7 g/dL   PROTHROMBIN TIME    Collection Time: 08/12/23  6:17 PM   Result Value Ref Range    PT 13.7 12.0 - 14.6 sec    INR 1.06 0.87 - 1.13   APTT    Collection Time: 08/12/23  6:17 PM   Result Value Ref Range    APTT 26.5 24.7 - 36.0 sec   COD (ADULT)    Collection Time: 08/12/23  6:17 PM   Result Value Ref Range    ABO Grouping Only O     Rh Grouping Only POS     Antibody Screen-Cod NEG    TROPONIN    Collection Time: 08/12/23  6:17 PM   Result Value Ref Range    Troponin T 22 (H) 6 - 19 ng/L   ESTIMATED GFR    Collection Time: 08/12/23  6:17 PM   Result Value Ref Range    GFR (CKD-EPI) 96 >60 mL/min/1.73 m 2       Imaging  CT-CTA NECK WITH & W/O-POST PROCESSING   Final Result      1.  Occlusion or severe occlusion of the RIGHT internal carotid artery.   2.  Occlusion of the LEFT internal carotid artery.      Findings were discussed with Dr Sanders on 8/12/2023 6:25 PM.      CT-CTA HEAD WITH & W/O-POST  PROCESS   Final Result   Addendum (preliminary) 1 of 1   Additional   impression: Occlusion of the petrous through proximal supraclinoid    BILATERAL internal carotid arteries. Anterior circulation may be dependent    on flow through posterior and anterior communicating arteries. This was    also discussed with Dr Sanders.      Final      RIGHT M1 occlusion      Findings were discussed with Dr Sanders on 8/12/2023 6:19 PM.         CT-CEREBRAL PERFUSION ANALYSIS   Final Result      1.Cerebral blood flow less than 30% likely representing completed infarct = 0 mL   2.T Max more than 6 seconds likely representing combination of completed infarct and ischemia = 108 mL   3. Mismatched volume likely representing ischemic brain/penumbra= 108 mL   4.Please note that the cerebral perfusion was performed on the limited brain tissue around the basal ganglia region. Infarct/ischemia outside the CT perfusion sections may not be seen on this study.      Findings were discussed with Dr Sanders on 8/12/2023 6:18 PM.      CT-HEAD W/O   Final Result      1. Cerebral atrophy.   2. White matter lucencies most consistent with small vessel ischemic change versus demyelination or gliosis.   3. Small areas of encephalomalacia in the BILATERAL frontal vertex Otherwise, Head CT without contrast with no acute findings. No evidence of acute cerebral infarction, hemorrhage or mass lesion.         DX-CHEST-PORTABLE (1 VIEW)    (Results Pending)   IR-THROMBO MECHANICAL ARTERY,INIT    (Results Pending)   EC-ECHOCARDIOGRAM COMPLETE W/O CONT    (Results Pending)   MR-BRAIN-W/O    (Results Pending)   CT-HEAD W/O    (Results Pending)       Assessment/Plan  * Acute CVA (cerebrovascular accident) ([facility])  Assessment & Plan  08/12/23 R ICA/M1 thrombus   No IV TNK; emergent IR for clot retrieval   During IR the clot broke and moved distal into A1/M2 making a retrieval not possible  Stenotic R ICA underwent angioplasty and she was given Integrilin      Q1 hour neuro checks  HOB > 30 degrees  Maintain normal temperature  Goal glucose 140-180  MRI ordered  ECHO ordered  Neurology following  -160 to maintain adequate perfusion  Lipid panel, Hgb A1c  SLP   Integrilin gtt x 24 hours then transition to ASA/Plavix     Atrial fibrillation (HCC)- (present on admission)  Assessment & Plan  Ablation and watchman 6/23/23 by Jose BARNETT plan per neuro  Optimize electrolytes  AV kristine blockade prn    Transient ischemic attack- (present on admission)  Assessment & Plan  History of    Dyslipidemia- (present on admission)  Assessment & Plan  Allergy to atorvastatin in history     Essential hypertension- (present on admission)  Assessment & Plan  Reintroduce oral antihypertensives when clinically appropriate         Discussed patient condition and risk of morbidity and/or mortality with RN, RT, Charge nurse / hot rounds, and Patient.    The patient remains critically ill.  Critical care time = 75 minutes in directly providing and coordinating critical care and extensive data review.  No time overlap and excludes procedures.

## 2023-08-13 NOTE — PROGRESS NOTES
4 Eyes Skin Assessment Completed by LINDSAY Forte and LINDSAY Hinson.    Head WDL  Ears WDL  Nose WDL  Mouth WDL  Neck WDL  Breast/Chest WDL  Shoulder Blades WDL  Spine WDL  (R) Arm/Elbow/Hand WDL  (L) Arm/Elbow/Hand Bruising to L shoulder  Abdomen WDL  Groin WDL except for groin puncture site from IR procedure  Scrotum/Coccyx/Buttocks WDL  (R) Leg WDL  (L) Leg WDL  (R) Heel/Foot/Toe WDL  (L) Heel/Foot/Toe WDL          Devices In Places SCD's      Interventions In Place Pillows, sacral mepilex    Possible Skin Injury No    Pictures Uploaded Into Epic N/A  Wound Consult Placed N/A  RN Wound Prevention Protocol Ordered No

## 2023-08-13 NOTE — CARE PLAN
The patient is Watcher - Medium risk of patient condition declining or worsening    Shift Goals  Clinical Goals: Q1H neuro checks, -160  Patient Goals: pain control  Family Goals: na    Progress made toward(s) clinical / shift goals:  Q1H neuro checks performed, patient's neuro status remained stable. Patient maintained BP within goal -160 mmHg. Patient and family updated and educated on plan of care.      Problem: Optimal Care of the Stroke Patient  Goal: Optimal emergency care for the stroke patient  Outcome: Progressing     Problem: Knowledge Deficit - Stroke Education  Goal: Patient's knowledge of stroke and risk factors will improve  Outcome: Progressing     Problem: Neuro Status  Goal: Neuro status will remain stable or improve  Outcome: Progressing     Problem: Hemodynamic Monitoring  Goal: Patient's hemodynamics, fluid balance and neurologic status will be stable or improve  Outcome: Progressing     Problem: Mobility - Stroke  Goal: Patient's capacity to carry out activities will improve  Outcome: Progressing     Problem: Fall Risk  Goal: Patient will remain free from falls  Outcome: Progressing

## 2023-08-13 NOTE — PROGRESS NOTES
Critical Care Progress Note    Date of admission  8/12/2023    Chief Complaint  63 y.o. female admitted 8/12/2023 with right MCA acute ischemic stroke.  She underwent emergent angioplasty of severe stenosis of the right ICA.  She has residual thrombus in the right M2 segment and distal right A1 segment.  She has a history of atrial fibrillation and underwent left atrial appendage closure with a Watchman device on 6/13/2023.  She has a history of primary hypertension, TIA, dyslipidemia    Hospital Course      8/13 -    continue Integrilin infusion.  Loaded with aspirin and ticagrelor.  Stop Integrilin infusion after appropriate bridge time.  Titrating nicardipine.  Continue neuro checks.      Interval Problem Update  Reviewed last 24 hour events:      SR  98.4  -714 mL in the last 24      Review of Systems  Review of Systems   Unable to perform ROS: Acuity of condition        Vital Signs for last 24 hours   Temp:  [36.7 °C (98 °F)-36.9 °C (98.4 °F)] 36.7 °C (98 °F)  Pulse:  [] 83  Resp:  [14-50] 50  BP: (128-187)/() 148/89  SpO2:  [93 %-100 %] 93 %    Hemodynamic parameters for last 24 hours       Respiratory Information for the last 24 hours       Physical Exam   Physical Exam  Constitutional:       Appearance: She is not diaphoretic.   HENT:      Head: Normocephalic.      Mouth/Throat:      Pharynx: Oropharynx is clear.   Eyes:      Pupils: Pupils are equal, round, and reactive to light.   Cardiovascular:      Comments: Sinus rhythm  Pulmonary:      Breath sounds: No wheezing or rales.   Abdominal:      General: There is no distension.      Tenderness: There is no abdominal tenderness.   Musculoskeletal:      Right lower leg: No edema.      Left lower leg: No edema.   Skin:     General: Skin is warm.      Capillary Refill: Capillary refill takes less than 2 seconds.   Neurological:      Comments: Awake and alert.  Very pleasant.  Fully oriented.  A wee bit of left facial droop.  Normal strength in her  extremities.         Medications  Current Facility-Administered Medications   Medication Dose Route Frequency Provider Last Rate Last Admin    MD Alert...ICU Electrolyte Replacement per Pharmacy   Other PHARMACY TO DOSE Angel Wheeler M.D.        HYDROmorphone (Dilaudid) injection 0.25-0.5 mg  0.25-0.5 mg Intravenous Q3HRS PRN Angel Wheeler M.D.        potassium chloride (Kcl) ivpb 10 mEq  10 mEq Intravenous Q HOUR Angel Wheeler M.D.   Stopped at 08/13/23 0957    magnesium sulfate IVPB premix 2 g  2 g Intravenous Once Angel Wheeler M.D.   Stopped at 08/13/23 1050    acetaminophen (Tylenol) tablet 650 mg  650 mg Oral Q6HRS PRN Kenneth Owens M.D.        ondansetron (Zofran) syringe/vial injection 4 mg  4 mg Intravenous Q4HRS PRN Kenneth Owens M.D.        ondansetron (Zofran ODT) dispertab 4 mg  4 mg Oral Q4HRS PRN Kenneth Owens M.D.        senna-docusate (Pericolace Or Senokot S) 8.6-50 MG per tablet 2 Tablet  2 Tablet Oral BID Kenneth Owens M.D.        And    polyethylene glycol/lytes (Miralax) PACKET 1 Packet  1 Packet Oral QDAY PRN Kenneth Owens M.D.        And    magnesium hydroxide (Milk Of Magnesia) suspension 30 mL  30 mL Oral QDAY PRN Kenneth Owens M.D.        And    bisacodyl (Dulcolax) suppository 10 mg  10 mg Rectal QDAY PRN Kenneth Owens M.D.        NS infusion   Intravenous Continuous Kenneth Owens M.D. 50 mL/hr at 08/12/23 2249 1,000 mL at 08/12/23 2249    iohexol (OMNIPAQUE) 300 mg/mL  90 mL Intra-arterial Once Yariel Sanders M.D.        eptifibatide 0.75 mg/mL (Integrilin) infusion  1 mcg/kg/min Intravenous Continuous Yariel Sanders M.D. 6.3 mL/hr at 08/13/23 0824 1 mcg/kg/min at 08/13/23 0824    norepinephrine (Levophed) 8 mg in 250 mL NS infusion (premix)  0-1 mcg/kg/min (Ideal) Intravenous Continuous Kenneth Owens M.D.   Held at 08/12/23 2030    niCARdipine (Cardene) 25 mg in  mL Standard Infusion  0-15 mg/hr  Intravenous Continuous Kenneth Owens M.D. 25 mL/hr at 08/13/23 0619 2.5 mg/hr at 08/13/23 0619    acetaminophen (Tylenol) suppository 650 mg  650 mg Rectal Q4HRS PRN Kristin Agudelo           Fluids    Intake/Output Summary (Last 24 hours) at 8/13/2023 1001  Last data filed at 8/13/2023 0800  Gross per 24 hour   Intake 840.19 ml   Output 1400 ml   Net -559.81 ml       Laboratory          Recent Labs     08/12/23 1817 08/13/23  0320   SODIUM 132* 139   POTASSIUM 4.0 3.4*   CHLORIDE 98 103   CO2 24 24   BUN 12 9   CREATININE 0.68 0.66   MAGNESIUM  --  1.8   PHOSPHORUS  --  3.2   CALCIUM 9.0 9.3     Recent Labs     08/12/23 1817 08/13/23  0320   ALTSGPT 25 24   ASTSGOT 35 30   ALKPHOSPHAT 59 54   TBILIRUBIN 0.4 0.6   GLUCOSE 117* 114*     Recent Labs     08/12/23 1817 08/13/23  0320   WBC 9.5 5.4   NEUTSPOLYS 86.50* 66.80   LYMPHOCYTES 7.00* 21.00*   MONOCYTES 5.60 11.10   EOSINOPHILS 0.20 0.70   BASOPHILS 0.10 0.20   ASTSGOT 35 30   ALTSGPT 25 24   ALKPHOSPHAT 59 54   TBILIRUBIN 0.4 0.6     Recent Labs     08/12/23 1817 08/13/23  0320   RBC 3.86* 3.56*   HEMOGLOBIN 12.4 11.5*   HEMATOCRIT 35.7* 33.6*   PLATELETCT 293 264   PROTHROMBTM 13.7  --    APTT 26.5  --    INR 1.06  --        Imaging  CT:    Images personally reviewed.  Occluded left ICA.  Near occlusion of right ICA.    Assessment/Plan  * Acute ischemic stroke (HCC)  Assessment & Plan  Acute right MCA ischemic stroke  CTA with critical right ICA stenosis with right M1 occlusion as well as occlusion of the left ICA  She did not received tenecteplase  S/P emergent IR intervention with right ICA angioplasty - subsequent thrombus in right M2 and distal right A1 segments  On Integrilin infusion - load with aspirin and ticagrelor and stop Integrilin infusion after appropriate bridge time  Strict blood pressure control with systolic blood pressure goal of 120-160  I am titrating a nicardipine drip to achieve blood pressure goals  Neuro checks every  hour  Echocardiogram, lipid panel, glycohemoglobin  MRI brain    Atrial fibrillation (HCC)- (present on admission)  Assessment & Plan  S/P left atrial appendage closure with Watchman device on 6/13/2023  S/P ablation on 6/23/2023  She is currently in sinus rhythm  Optimize potassium and magnesium    Primary hypertension- (present on admission)  Assessment & Plan  Strict blood pressure control with goal -160  I am titrating a nicardipine drip to achieve blood pressure goals    Hypomagnesemia  Assessment & Plan  Replete magnesium    Hypokalemia  Assessment & Plan  Replete potassium    Transient ischemic attack- (present on admission)  Assessment & Plan  History of    Dyslipidemia- (present on admission)  Assessment & Plan  History of allergy to atorvastatin         VTE:  Contraindicated  Ulcer: Not Indicated  Lines: None    I have performed a physical exam and reviewed and updated ROS and Plan today (8/13/2023). In review of yesterday's note (8/12/2023), there are no changes except as documented above.     This lady is critically ill with an acute ischemic stroke.  She has undergone emergent angioplasty of her right internal carotid artery.  She requires very close neurological monitoring and strict blood pressure control.  I have assessed and reassessed her blood pressure, hemodynamics, cardiovascular status with titration of nicardipine and her neurologic status.  She is at increased risk for worsening CNS system dysfunction.    Discussed patient condition and risk of morbidity and/or mortality with RN, RT, Pharmacy, Charge nurse / hot rounds, and QA team    The patient remains critically ill.  Critical care time = 35 minutes in directly providing and coordinating critical care and extensive data review.  No time overlap and excludes procedures.    Angel Wheeler MD  Pulmonary and Critical Care Medicine

## 2023-08-13 NOTE — THERAPY
Speech Language Pathology   Clinical Swallow Evaluation     Patient Name: Juan Luis Rolon  AGE:  63 y.o., SEX:  female  Medical Record #: [id number]  Date of Service: 8/13/2023      History of Present Illness    Patient is a 63 year old female who presented to ER on 8/12/23 with left side weakness and Headache.  CTH without hemorrhage, CTA with R ICA/M1 thrombus. IV TNK was not delivered, and she was taken for emergent thrombectomy.  Per notes, during IR the clot broke and moved distal into A1/M2 making a retrieval not possible.  She then underwent R ICA angioplasty.      8/12/23: CT head:   IMPRESSION:     1. Cerebral atrophy.  2. White matter lucencies most consistent with small vessel ischemic change versus demyelination or gliosis.  3. Small areas of encephalomalacia in the BILATERAL frontal vertex Otherwise, Head CT without contrast with no acute findings. No evidence of acute cerebral infarction, hemorrhage or mass lesion    PMHx:  Patient with history of Afib s/p ablation by Dr. Garcia 6/23/23, HTN, HLD, TIAs  and stroke 5/11/21.      General Information:  Vitals  O2 Delivery Device: None - Room Air  Level of Consciousness: Alert, Awake     Orientation: Oriented x 4  Follows Directives: Yes      Prior Living Situation & Level of Function:  Prior Services: None  Housing / Facility: [address] [place] [address]  Lives with - Patient's Self Care Capacity: Alone and Able to Care For Self  Comments: Pt lives in [place], [place].  She is a retired .  Education: Completed College  Communication: within functional limits  Swallowing: no history of dysphagia or difficulty swallowing       Oral Mechanism Evaluation:  Dentition: Good   Facial Symmetry: Equal  Facial Sensation: Equal     Labial Observations:  (very subtle left side asymmetry with mouth opening and during speech tasks, but able to achieve a symmetrical smile--this is much improved from yesterday per RN)   Lingual Observations: Midline  Motor Speech: Speech is  intelligible--no dysarthria noted         Laryngeal Function:  Secretion Management: Adequate  Voice Quality: WFL        Cough: Perceptually WNL     Subjective  Pt awake, alert and sitting up in bed.  She was in agreement with swallow evaluation and reports she is back to her baseline.      Assessment  Current Method of Nutrition: NPO until cleared by speech pathology  Positioning: Lua's (60-90 degrees)  Bolus Administration: Patient    O2 Delivery Device: None - Room Air  Factor(s) Affecting Performance: None  Tracheostomy : No     Swallowing Trials:    Ice: WFL  Thin Liquid (TN0): WFL  Mildly Thick Liquid (MT2): WFL  Liquidised (LQ3): WFL  Pureed (PU4): WFL  Minced & Moist (MM5): Not tested  Soft & Bite Sized (SB6): WFL  Easy to Chew (EC7): Not tested  Regular (RG7): WFL    Comments:  Patient with intact labial seal, mastication, bolus formation and posterior lingual propulsion with no anterior loss of bolus or oral residue post swallow.  Pt was able to form a seal and suction around a straw.  Pharyngeal swallow response appeared timely.  Patient did not have any signs or symptoms of aspiration on any of the textures presented.    Patient with 1-2 swallows per bolus with no concerns for pharyngeal inefficiency.  Vitals remained stable during the session with no significant changes in saturations or RR.      Clinical Impressions     Patient presents with oropharyngeal swallow skills that are WFL.  There were no concerns for pharyngeal inefficiency or aspiration at bedside.  Would recommend initiation of regular diet with thin liquids IDDSI RG7/TN0. SLP will follow x1 as needed to ensure tolerance for diet. Education provided to patient regarding signs of dysphagia to be aware of and that she needs to notify RN if there are any changes in swallowing, speech or cognitive functioning.  Patient verbalized understanding or education provided. Given her acute neurological event, SLP will follow x1 for dysphagia  "management to ensure tolerance.   Patient also has orders for cognitive evaluation.  Repeat CT and initial MRI are pending.  Will await repeat imaging and defer need for this evaluation to primary SLP and MD given that patient is reporting she is at or close to baseline.     Thank you very much for this consult.      Recommendations:     Diet Consistency: Regular diet: thin liquids (IDDSI:  RG7/TN0)  Instrumentation: None indicated at this time  Medication: As tolerated  Supervision: Distant supervision - check on patient 2-3 times per meal (given recent neuro event)  Positioning: Meals sitting upright in a chair, as tolerated  Risk Management : Physical mobility, as tolerated  Oral Care: Q8h  Cognitive/Speech/Language evaluation is ordered and is warranted given some difficulty with word finding that patient reports is not baseline.         SLP Treatment Plan  Treatment Plan: Dysphagia Treatment (follow x1 for swallowing to ensure tolerance.  Has orders for cog evaluation--will defer to primary SLP and MD as to whether or not it is warranted)  SLP Frequency: 2x Per Week  Estimated Duration: 1 Visit (1 more visit for dysphagia)      Anticipated Discharge Needs  Discharge Recommendations:  (do not anticipate need for post acute SLP services for dysphagia)        Patient / Family Goals  Patient / Family Goal #1: \"I am really hungry\"  Short Term Goals  Short Term Goal # 1: Pt will be able to consume regular diet with thin liquids with no overt signs or symptoms of aspiration noted  Short Term Goal # 2: Pt will be able to verbalize understanding of signs of dysphagia with independence following education from SLP.      Ching Gomes, SLP   "

## 2023-08-13 NOTE — ASSESSMENT & PLAN NOTE
S/P left atrial appendage closure with Watchman device on 6/13/2023  S/P ablation on 6/23/2023  She is currently in sinus rhythm  Optimize potassium and magnesium

## 2023-08-13 NOTE — ED PROVIDER NOTES
ED Provider Note    CHIEF COMPLAINT  Chief Complaint   Patient presents with    Possible Stroke     Pt rosamaria Pineda from home, pt went to bathroom and could not get her pants down, pt ended up on the floor, pt called neighbor to call 911, symptoms started at 1530, pt arrived with facial drop in left side, weakness left arm and leg, pt has hx of TIA, ablation for afib on 6/23/23, bp 193/111, HR 84, pt was having headache behind right eye this morning with spots       LIMITATION TO HISTORY   Select: No limitation.  Patient has some slight aphasia.        HPI    Juan Luis Rolon is a 63 y.o. female with a stroke notification.  See outside historian note below.  Time 330  Observed facial droop, left arm weakness, left leg weakness  Contraindications not on anticoagulation  Vital signs noted to be elevated blood pressure  No other associated symptoms at this time.    OUTSIDE HISTORIAN(S):  Select: EMS gave some of the report.  Apparently she but had sudden onset at 330.  She was on the bathroom.  She difficulty getting up.  There is noted be unilateral sided weakness of the left leg and left arm.  Patient noted to be hypertensive at the time.  They did include a history that she had ablation.  Patient states he was on in the middle of June.  Because she had been on ACE her A-fib and she been on Eliquis.  She is currently only on aspirin therapy has not been on Eliquis since the ablation therapy.    Patient has been prone to multiple TIAs in the past.    EXTERNAL RECORDS REVIEWED  Select: Other not at the time as the patient came as a stroke.    REVIEW OF SYSTEMS      PAST MEDICAL HISTORY  Past Medical History:   Diagnosis Date    Arrhythmia 12/23/2022    Hypertension     Stroke (HCC) 5/11/2011    TIA       FAMILY HISTORY  History reviewed. No pertinent family history.    SOCIAL HISTORY  Social History     Tobacco Use    Smoking status: Never    Smokeless tobacco: Never   Vaping Use    Vaping Use: Never used   Substance Use  Topics    Alcohol use: Yes     Alcohol/week: 3.5 oz     Types: 7 Glasses of wine per week     Comment: 2 per day    Drug use: No     Social History     Substance and Sexual Activity   Drug Use No       SURGICAL HISTORY  History reviewed. No pertinent surgical history.    CURRENT MEDICATIONS    Current Facility-Administered Medications:     [COMPLETED] iohexol (OMNIPAQUE) 350 mg/mL (IV), 40 mL, Intravenous, Once, Mamadou Ediss, R.N., 40 mL at 08/12/23 1830    [COMPLETED] iohexol (OMNIPAQUE) 350 mg/mL (IV), 80 mL, Intravenous, Once, Mamadou Ediss, R.N., 80 mL at 08/12/23 1830    Current Outpatient Medications:     colchicine (COLCRYS) 0.6 MG Tab, Take 1 Tablet (0.6 mg) by mouth 2 times daily., Disp: , Rfl:     aspirin (ASA) 81 MG Chew Tab chewable tablet, Chew 1 Tablet every day., Disp: 100 Tablet, Rfl: 1    losartan (COZAAR) 25 MG Tab, TAKE 1 TABLET BY MOUTH  DAILY (Patient taking differently: Take 25 mg by mouth every day.), Disp: 90 Tablet, Rfl: 3    terazosin (HYTRIN) 1 MG Cap, TAKE 1 CAPSULE BY MOUTH  EVERY NIGHT AT BEDTIME (Patient taking differently: Take 1 mg by mouth every day.), Disp: 90 Capsule, Rfl: 3    amLODIPine (NORVASC) 2.5 MG Tab, TAKE 1 TABLET BY MOUTH  DAILY, Disp: 90 Tablet, Rfl: 3    atenolol (TENORMIN) 50 MG Tab, TAKE 1 TABLET BY MOUTH  DAILY, Disp: 90 Tablet, Rfl: 3    spironolactone/hctz (ALDACTAZIDE) 25-25 MG Tab, TAKE ONE-HALF TABLET BY  MOUTH DAILY (Patient taking differently: Take 0.5 Tablets by mouth every day.), Disp: 45 Tablet, Rfl: 3    L-Lysine 500 MG Tab, Take 500 mg by mouth every day., Disp: , Rfl:     tretinoin (RETIN-A) 0.025 % cream, Apply 1 Application topically every evening., Disp: , Rfl:     docosahexanoic acid (OMEGA 3 FA) 1000 MG CAPS, Take 1,000 mg by mouth every day., Disp: , Rfl:     ALLERGIES  Allergies   Allergen Reactions    Atorvastatin     Codeine Vomiting    Morphine Vomiting    Plavix [Clopidogrel Bisulfate] Hives    Warfarin Hives       PHYSICAL EXAM  VITAL SIGNS:  "BP (!) 175/98   Pulse 86   Temp 36.7 °C (98.1 °F) (Temporal)   Resp 15   Ht 1.702 m (5' 7\")   Wt 79 kg (174 lb 2.6 oz)   SpO2 99%   BMI 27.28 kg/m²   Reviewed and noted elevated blood pressure.  Constitutional: Well developed, Well nourished, no acute distress..  HENT: Normocephalic, atraumatic, bilateral external ears normal, No intraoral erythema, edema, exudate  Eyes: PERRLA, conjunctiva pink, no scleral icterus.   Cardiovascular: Regular rate and rhythm. No murmurs, rubs or gallops.  No dependent edema or calf tenderness  Respiratory: Lungs clear to auscultation bilaterally. No wheezes, rales, or rhonchi.  Abdominal:  Abdomen soft, non-tender, non distended. No rebound, or guarding.    Skin: No erythema, no rash. No wounds or bruising.  Genitourinary: No costovertebral angle tenderness.   Musculoskeletal: no deformities.   Neurologic: Patient droop noted.  Patient had some left arm drift.  Left leg drift.  Rest of neurologic exam was performed by the neurologist.  Psychiatric: Affect normal, Judgment normal, Mood normal.         MEDICAL DECISION MAKING:  PROBLEMS EVALUATED THIS VISIT:  New onset stroke.  Patient does make thrombolytic window.  Bated blood pressure noted but no other absolute contraindications noted from history.         PLAN:  CT scan protocol  Monitor blood pressure.    RISK:  Stroke in the significant morbidity and if hemorrhagic or even worsening can be significant mortality.    RESULTS      LABS Ordered and Reviewed by Me:  No labs were resulted at time of dictation and at that time patient left the floor.    Rhythm Strip: Interpretation by me normal rhythm rate of 70    EKG Interpretation by me see labs section        RADIOLOGY        Radiologist interpretation:   CT-CTA NECK WITH & W/O-POST PROCESSING   Final Result      1.  Occlusion or severe occlusion of the RIGHT internal carotid artery.   2.  Occlusion of the LEFT internal carotid artery.      Findings were discussed with " Marilyn on 8/12/2023 6:25 PM.      CT-CTA HEAD WITH & W/O-POST PROCESS   Final Result   Addendum (preliminary) 1 of 1   Additional   impression: Occlusion of the petrous through proximal supraclinoid    BILATERAL internal carotid arteries. Anterior circulation may be dependent    on flow through posterior and anterior communicating arteries. This was    also discussed with Dr Sanders.      Final      RIGHT M1 occlusion      Findings were discussed with Dr Sanders on 8/12/2023 6:19 PM.         CT-CEREBRAL PERFUSION ANALYSIS   Final Result      1.Cerebral blood flow less than 30% likely representing completed infarct = 0 mL   2.T Max more than 6 seconds likely representing combination of completed infarct and ischemia = 108 mL   3. Mismatched volume likely representing ischemic brain/penumbra= 108 mL   4.Please note that the cerebral perfusion was performed on the limited brain tissue around the basal ganglia region. Infarct/ischemia outside the CT perfusion sections may not be seen on this study.      Findings were discussed with Dr Sanders on 8/12/2023 6:18 PM.      CT-HEAD W/O   Final Result      1. Cerebral atrophy.   2. White matter lucencies most consistent with small vessel ischemic change versus demyelination or gliosis.   3. Small areas of encephalomalacia in the BILATERAL frontal vertex Otherwise, Head CT without contrast with no acute findings. No evidence of acute cerebral infarction, hemorrhage or mass lesion.         DX-CHEST-PORTABLE (1 VIEW)    (Results Pending)   IR-THROMBO MECHANICAL ARTERY,INIT    (Results Pending)         ED COURSE:    ED Observation Status?  No the patient be admitted.    INTERVENTIONS BY ME:  Medications   iohexol (OMNIPAQUE) 350 mg/mL (IV) (40 mL Intravenous Given 8/12/23 1830)   iohexol (OMNIPAQUE) 350 mg/mL (IV) (80 mL Intravenous Given 8/12/23 1830)     Has already left    Discussed management of the patient with the following physicians and sources:   Dr. Falcon the  neurologist at the bedside.    FINAL DISPO PLAN   Will go to the interventional lab.  Patient will then go to the ICU.    She is at this point in critical condition    Patient has a critical condition that can have significant morbidity and mortality.  Evaluation at bedside, discussion with the neurologist, discussion with the intensivist, dictation, radiographic interpretation, was approximately 35 minutes of critical care.    CONDITION: Critical..     FINAL IMPRESSION  1. Acute CVA (cerebrovascular accident) (HCC)    2.  Critical care

## 2023-08-13 NOTE — PROGRESS NOTES
Neurology Progress Note  Neurohospitalist Service, [facility]    Referring Physician: MARYJANE Cook*      Interval History: No acute events overnight.  Admitted to RICU for R MCA stroke- R ICA occlusion now s/p R ICA angioplasty.  Neurologic exam much improved.  Endorses plavix allergy.    Review of systems: In addition to what is detailed in the HPI and/or updated in the interval history, all other systems reviewed and are negative.    Past Medical History, Past Surgical History and Social History reviewed and unchanged from prior    Medications:    Current Facility-Administered Medications:     MD Alert...ICU Electrolyte Replacement per Pharmacy, , Other, PHARMACY TO DOSE, Angel Wheeler M.D.    HYDROmorphone (Dilaudid) injection 0.25-0.5 mg, 0.25-0.5 mg, Intravenous, Q3HRS PRN, Angel Wheeler M.D.    PHENYLEPHRINE HCL-NACL 1-0.9 MG/10ML-% IV SOSY, , , ,     acetaminophen (Tylenol) tablet 650 mg, 650 mg, Oral, Q6HRS PRN, Kenneth Owens M.D.    ondansetron (Zofran) syringe/vial injection 4 mg, 4 mg, Intravenous, Q4HRS PRN, Kenneth Owens M.D.    ondansetron (Zofran ODT) dispertab 4 mg, 4 mg, Oral, Q4HRS PRN, Kenneth Owens M.D.    senna-docusate (Pericolace Or Senokot S) 8.6-50 MG per tablet 2 Tablet, 2 Tablet, Oral, BID **AND** polyethylene glycol/lytes (Miralax) PACKET 1 Packet, 1 Packet, Oral, QDAY PRN **AND** magnesium hydroxide (Milk Of Magnesia) suspension 30 mL, 30 mL, Oral, QDAY PRN **AND** bisacodyl (Dulcolax) suppository 10 mg, 10 mg, Rectal, QDAY PRN, Kenneth Owens M.D.    NS infusion, , Intravenous, Continuous, Kenneth Owens M.D., Last Rate: 50 mL/hr at 08/12/23 2249, 1,000 mL at 08/12/23 2249    iohexol (OMNIPAQUE) 300 mg/mL, 90 mL, Intra-arterial, Once, Yariel Sanders M.D.    eptifibatide 0.75 mg/mL (Integrilin) infusion, 1 mcg/kg/min, Intravenous, Continuous, Yariel Sanders M.D., Last Rate: 6.3 mL/hr at 08/12/23 2026, 1  "mcg/kg/min at 08/12/23 2026    norepinephrine (Levophed) 8 mg in 250 mL NS infusion (premix), 0-1 mcg/kg/min (Ideal), Intravenous, Continuous, Kenneth Owens M.D., Held at 08/12/23 2030    niCARdipine (Cardene) 25 mg in  mL Standard Infusion, 0-15 mg/hr, Intravenous, Continuous, Kenneth Owens M.D., Last Rate: 25 mL/hr at 08/13/23 0619, 2.5 mg/hr at 08/13/23 0619    acetaminophen (Tylenol) suppository 650 mg, 650 mg, Rectal, Q4HRS PRN, Kristin Agudelo    Physical Examination:   /82   Pulse 79   Temp 36.8 °C (98.2 °F) (Temporal)   Resp 16   Ht 1.702 m (5' 7\")   Wt 78.9 kg (173 lb 15.1 oz)   SpO2 94%   Breastfeeding No   BMI 27.24 kg/m²       General: Patient is awake and in no acute distress  Neck: There is normal range of motion  CV: Regular rate   Extremities:  Warm, dry, and intact, without peripheral lower extremity edema    NEUROLOGICAL EXAM:     Mental status: Awake, alert and fully oriented  Speech and language: Speech is clear and fluent. The patient is able to name and repeat, and follow commands  Cranial nerve exam:  Eyes midline, no gaze preference, tracks easily across midline, visual fields are full.  Face symmetric.  Motor exam: There is sustained antigravity with no downward drift in bilateral arms and legs.  Sensory exam:  Reacts to tactile in all 4 distal extremities, there is no neglect to double stim.  Coordination: No ataxia on finger-to-nose testing bilaterally.  Gait: Deferred due to patient preference.     NIHSS: National Institutes of Health Stroke Scale     [0] 1a:Level of Consciousness           0-alert 1-drowsy   2-stupor   3-coma  [0] 1b:LOC Questions                         0-both  1-one      2-neither  [0] 1c:LOC Commands                       0-both  1-one      2-neither  [0] 2: Best Gaze                      0-nl    1-partial  2-forced  [0] 3: Visual Fields                              0-nl    1-partial  2-complete 3-bilat  [0] 4: Facial Paresis                " 0-nl    1-minor    2-partial  3-full  MOTOR                                              0-nl  [0] 5: Right Arm                       1-drift  [0] 6: Left Arm                                     2-some effort vs gravity  [0] 7: Right Leg                       3-no effort vs gravity  [0] 8: Left Leg                                      4-no movement                                                              x-untestable  [0] 9: Limb Ataxia                    0-abs   1-1_limb   2-2+_limbs                                                              x-untestable  [0] 10:Sensory                        0-nl    1-partial  2-dense  [0] 11:Best Language/Aphasia         0-nl    1-mild/mod 2-severe   3-mute  [0] 12:Dysarthria                     0-nl    1-mild/mod 2-severe                                                              x-untestable  [0] 13:Neglect/Inattention                   0-none  1-partial  2-complete  [0] TOTAL      Objective Data:    Labs:  Lab Results   Component Value Date/Time    PROTHROMBTM 13.7 08/12/2023 06:17 PM    INR 1.06 08/12/2023 06:17 PM      Lab Results   Component Value Date/Time    WBC 5.4 08/13/2023 03:20 AM    RBC 3.56 (L) 08/13/2023 03:20 AM    HEMOGLOBIN 11.5 (L) 08/13/2023 03:20 AM    HEMATOCRIT 33.6 (L) 08/13/2023 03:20 AM    MCV 94.4 08/13/2023 03:20 AM    MCH 32.3 08/13/2023 03:20 AM    MCHC 34.2 08/13/2023 03:20 AM    MPV 9.1 08/13/2023 03:20 AM    NEUTSPOLYS 66.80 08/13/2023 03:20 AM    LYMPHOCYTES 21.00 (L) 08/13/2023 03:20 AM    MONOCYTES 11.10 08/13/2023 03:20 AM    EOSINOPHILS 0.70 08/13/2023 03:20 AM    BASOPHILS 0.20 08/13/2023 03:20 AM      Lab Results   Component Value Date/Time    SODIUM 139 08/13/2023 03:20 AM    POTASSIUM 3.4 (L) 08/13/2023 03:20 AM    CHLORIDE 103 08/13/2023 03:20 AM    CO2 24 08/13/2023 03:20 AM    GLUCOSE 114 (H) 08/13/2023 03:20 AM    BUN 9 08/13/2023 03:20 AM    CREATININE 0.66 08/13/2023 03:20 AM      No results found for: CHOLSTRLTOT, LDL,  HDL, TRIGLYCERIDE    Lab Results   Component Value Date/Time    ALKPHOSPHAT 54 08/13/2023 03:20 AM    ASTSGOT 30 08/13/2023 03:20 AM    ALTSGPT 24 08/13/2023 03:20 AM    TBILIRUBIN 0.6 08/13/2023 03:20 AM        Imaging/Testing:    I interpreted and/or reviewed the patient's neuroimaging    DX-CHEST-PORTABLE (1 VIEW)   Final Result      No acute cardiopulmonary abnormality.      IR-THROMBO MECHANICAL ARTERY,INIT   Final Result      1.  Severe stenosis of cavernous segment of the right internal carotid artery likely representing acute plaque rupture.   2.  Successful angioplasty of the atherosclerotic stenosis with moderate improvement.   3.  Residual thrombus and occlusion in the right M2 segment   4.  Nonflow limiting residual thrombus in the distal right A1 segment.      CT-CTA NECK WITH & W/O-POST PROCESSING   Final Result      1.  Occlusion or severe occlusion of the RIGHT internal carotid artery.   2.  Occlusion of the LEFT internal carotid artery.      Findings were discussed with Dr Sanders on 8/12/2023 6:25 PM.      CT-CTA HEAD WITH & W/O-POST PROCESS   Final Result   Addendum (preliminary) 1 of 1   Additional   impression: Occlusion of the petrous through proximal supraclinoid    BILATERAL internal carotid arteries. Anterior circulation may be dependent    on flow through posterior and anterior communicating arteries. This was    also discussed with Dr Sanders.      Final      RIGHT M1 occlusion      Findings were discussed with Dr Sanders on 8/12/2023 6:19 PM.         CT-CEREBRAL PERFUSION ANALYSIS   Final Result      1.Cerebral blood flow less than 30% likely representing completed infarct = 0 mL   2.T Max more than 6 seconds likely representing combination of completed infarct and ischemia = 108 mL   3. Mismatched volume likely representing ischemic brain/penumbra= 108 mL   4.Please note that the cerebral perfusion was performed on the limited brain tissue around the basal ganglia region.  Infarct/ischemia outside the CT perfusion sections may not be seen on this study.      Findings were discussed with Dr Sanders on 8/12/2023 6:18 PM.      CT-HEAD W/O   Final Result      1. Cerebral atrophy.   2. White matter lucencies most consistent with small vessel ischemic change versus demyelination or gliosis.   3. Small areas of encephalomalacia in the BILATERAL frontal vertex Otherwise, Head CT without contrast with no acute findings. No evidence of acute cerebral infarction, hemorrhage or mass lesion.         MR-BRAIN-W/O    (Results Pending)   CT-HEAD W/O    (Results Pending)   EC-ECHOCARDIOGRAM COMPLETE W/O CONT    (Results Pending)       Assessment and Plan:  Juan Luis Rolon is a 63 year old woman presenting with R MCA syndrome, found to have R ICA/M1 occlusions.  In cath lab- R ICA thrombus partially lysed and migrated to R M2 and A1 segments.  The R cavernous ICA was critically stenotic, and she is now s/p R ICA angioplasty.  She was started on Integrilin infusion to prevent reocclusion.  Will convert to DAPT today with ASA and birlinta (plavix allergy).  Given persistent M2 occlusion, continue with acute hypertensive response.      Problem list:  R MCA stroke  History of stroke  Atrial fibrillation  Hypertension  L ICA occlusion    Plan:  Recommendations:             - neurochecks/NIHSS per post-thrombectomy protocol              - expedite MRI brain without contrast, does not need to wait 24 hours              - repeat head CT only if there is clinical deterioration              - transition from Integrilin infusion to birlinta loading dose 180mg x1, ASA 325mg x 1, followed by birlinta 90mg  BID indefinitely, and ASA 81mg daily x 90 days   - given persistent R M2 occlusion, continue acute hypertensive response for SBP goal 120-160- may slowly normalize over days  - stroke labs:  HgbA1c and lipid panel  - start atorvastatin 40mg daily for goal LDL < 70 when able, titrate dose to LDL results  -  PT/OT/SLP ok even within first 24 hours  - may defer TTE and ziopatch monitoring given known atrial fibrillation    The evaluation of the patient, and recommended management, was discussed with Dr. Wheeler, ICU attending. I have performed a physical exam and reviewed and updated ROS and Plan today (8/13/2023).     Kenneth Falcon MD  Neurohospitalist, Acute Care Services

## 2023-08-13 NOTE — OR SURGEON
Immediate Post- Operative Note        Findings: right ICA  occlusion      Procedure(s): ICA angioplasty      Estimated Blood Loss: Less than 5 ml        Complications: None            8/12/2023     8:47 PM     Yariel Sanders M.D.

## 2023-08-13 NOTE — CARE PLAN
The patient is Watcher - Medium risk of patient condition declining or worsening    Shift Goals  Clinical Goals: neuro and site checks per post-thrombectomy protocol  Patient Goals: pain control  Family Goals: na    Progress made toward(s) clinical / shift goals:      Problem: Urinary Elimination  Goal: Establish and maintain regular urinary output  Outcome: Not Met     Needed to straight cath pt to help her void since she could not get up to the restroom    Patient is not progressing towards the following goals:      Problem: Urinary Elimination  Goal: Establish and maintain regular urinary output  Outcome: Not Met      Problem: Optimal Care of the Stroke Patient  Goal: Optimal emergency care for the stroke patient  Outcome: Progressing  Goal: Optimal acute care for the stroke patient  Outcome: Progressing     Problem: Knowledge Deficit - Stroke Education  Goal: Patient's knowledge of stroke and risk factors will improve  Outcome: Progressing     Problem: Psychosocial - Patient Condition  Goal: Patient's ability to verbalize feelings about condition will improve  Outcome: Progressing  Goal: Patient's ability to re-evaluate and adapt role responsibilities will improve  Outcome: Progressing     Problem: Discharge Planning - Stroke  Goal: Ensure Stroke Core Measures are met prior to discharge  Outcome: Progressing  Goal: Patient’s continuum of care needs will be met  Outcome: Progressing     Problem: Neuro Status  Goal: Neuro status will remain stable or improve  Outcome: Progressing     Problem: Hemodynamic Monitoring  Goal: Patient's hemodynamics, fluid balance and neurologic status will be stable or improve  Outcome: Progressing     Problem: Pain - Standard  Goal: Alleviation of pain or a reduction in pain to the patient’s comfort goal  Outcome: Progressing

## 2023-08-13 NOTE — PROGRESS NOTES
Cerebral angiogram with mechanical thrombectomy for R ICA M1 occlusion by Dr. Sanders.  Emergent consent. Pre-procedure pedal pulses 2+. Right groin access site. Pt placed on monitor, prepped and draped in a sterile fashion. Vital signs were taken every 5 minutes and remained within parameters (see doc flow sheets).  Unable to remove clot, angioplasty done. Hemostasis achieved using 8f angioseal deployed at 1930. Report given to Elba MONTOYA. Pt was transported to ICU with RN and monitor. Stroke worksheet review during warm handoff with RN. ICU responsible for serial checks starting at 2000. See Stroke Procedure flowsheet for VS, neuro, access, extremity serial assessments.    LKW:1530  NIH:7  Time in IR:1824  Access:1838 in right fem, 8fr   1st angio:1841  1st pass:n/a  Closure (end time):1930      TICI score n/a    Post procedure pedal pulses 2+    Angioseal, 8fr  REF: [id number]  LOT:  [id number]  EXP:  3/31/24

## 2023-08-13 NOTE — ED TRIAGE NOTES
"Chief Complaint   Patient presents with    Possible Stroke     Pt rosamaria Pineda from home, pt went to bathroom and could not get her pants down, pt ended up on the floor, pt called neighbor to call 911, symptoms started at 1530, pt arrived with facial drop in left side, weakness left arm and leg, pt has hx of TIA, ablation for afib on 6/23/23, bp 193/111, HR 84, pt was having headache behind right eye this morning with spots     BP (!) 175/98   Pulse 86   Temp 36.7 °C (98.1 °F) (Temporal)   Resp 15   Ht 1.702 m (5' 7\")   Wt 79 kg (174 lb 2.6 oz)   SpO2 99%   BMI 27.28 kg/m²     "

## 2023-08-13 NOTE — CONSULTS
Neurology STROKE CODE H&P  Neurohospitalist Service, [facility]    Referring Physician: Nehemias Richard, *    STROKE CODE: Left side weakness    To obtain the most accurate data regarding the time called, and time patient seen, refer to the stroke run-sheet and chart.  For time of CT, refer to the radiology report. See A&P below for TPA Decision and door to needle time if and when applicable.    HPI: Juan Luis Rolon is a 63 year old woman with atrial fibrillation s/p ablation and watchmen device, not anticoagulated, hypertension, presenting with L side weakness.  She reports that symptoms started around 1530, with associated R side headache.  On EMS arrival, noted L side deficits.  SBP 150s, HR 80s.  On arrival to [facility], NIHSS 7 as documented below, consistent with R MCA syndrome.  On chart review- underwent Watchmen device in June 2023, subsequently stopped anticoagulation with apixaban, and on ASA therapy only.  She reports medication compliance.  On ROS, denies infectious prodrome, or constitutional symptoms.    Review of systems: In addition to what is detailed in the HPI above, all other systems reviewed and are negative.    Past Medical History:    has a past medical history of Arrhythmia (12/23/2022), Hypertension, and Stroke (HCC) (5/11/2011).    FHx:  No family history of early strokes or blood clotting disorder.    SHx:   reports that she has never smoked. She has never used smokeless tobacco. She reports current alcohol use of about 3.5 oz of alcohol per week. She reports that she does not use drugs.    Allergies:  Allergies   Allergen Reactions    Atorvastatin     Codeine Vomiting    Morphine Vomiting    Plavix [Clopidogrel Bisulfate] Hives    Warfarin Hives       Medications:  No current facility-administered medications for this encounter.    Current Outpatient Medications:     colchicine (COLCRYS) 0.6 MG Tab, Take 1 Tablet (0.6 mg) by mouth 2 times daily., Disp: , Rfl:  "    aspirin (ASA) 81 MG Chew Tab chewable tablet, Chew 1 Tablet every day., Disp: 100 Tablet, Rfl: 1    losartan (COZAAR) 25 MG Tab, TAKE 1 TABLET BY MOUTH  DAILY (Patient taking differently: Take 25 mg by mouth every day.), Disp: 90 Tablet, Rfl: 3    terazosin (HYTRIN) 1 MG Cap, TAKE 1 CAPSULE BY MOUTH  EVERY NIGHT AT BEDTIME (Patient taking differently: Take 1 mg by mouth every day.), Disp: 90 Capsule, Rfl: 3    amLODIPine (NORVASC) 2.5 MG Tab, TAKE 1 TABLET BY MOUTH  DAILY, Disp: 90 Tablet, Rfl: 3    atenolol (TENORMIN) 50 MG Tab, TAKE 1 TABLET BY MOUTH  DAILY, Disp: 90 Tablet, Rfl: 3    spironolactone/hctz (ALDACTAZIDE) 25-25 MG Tab, TAKE ONE-HALF TABLET BY  MOUTH DAILY (Patient taking differently: Take 0.5 Tablets by mouth every day.), Disp: 45 Tablet, Rfl: 3    L-Lysine 500 MG Tab, Take 500 mg by mouth every day., Disp: , Rfl:     tretinoin (RETIN-A) 0.025 % cream, Apply 1 Application topically every evening., Disp: , Rfl:     docosahexanoic acid (OMEGA 3 FA) 1000 MG CAPS, Take 1,000 mg by mouth every day., Disp: , Rfl:     Physical Examination:    Vitals:    08/12/23 1700 08/12/23 1812 08/12/23 1814   BP:  (!) 175/98    Pulse:  86    Resp:  15    Temp:   36.7 °C (98.1 °F)   TempSrc:   Temporal   SpO2:  99%    Weight: 79 kg (174 lb 2.6 oz)     Height: 1.702 m (5' 7\")           General: Patient is awake and in no acute distress  Eye: Examination of optic disks not indicated at this time given acuity of consult  Neck: There is normal range of motion  CV: Regular rate   Extremities:  Clear, dry, intact, without peripheral edema    NEUROLOGICAL EXAM:   Mental status: Awake, alert and fully oriented  Speech and language: Speech is clear and fluent. The patient is able to name and repeat, and follow commands  Cranial nerve exam:  R gaze preference, but able to track past midline.  Decreased blink to threat from L.  L face droop.  Motor exam: There is sustained antigravity with no downward drift in R arm and leg.  " Left arm and leg antigravity with drift.  Sensory exam:  Reacts to tactile in all 4 distal extremities, there is no neglect to double stim.  Coordination: No ataxia on finger-to-nose testing on R  Gait: Deferred due to patient preference.    NIHSS: National Institutes of Health Stroke Scale    [0] 1a:Level of Consciousness    0-alert 1-drowsy   2-stupor   3-coma  [0] 1b:LOC Questions                  0-both  1-one      2-neither  [0] 1c:LOC Commands                   0-both  1-one      2-neither  [1] 2: Best Gaze                     0-nl    1-partial  2-forced  [2] 3: Visual Fields                   0-nl    1-partial  2-complete 3-bilat  [2] 4: Facial Paresis                0-nl    1-minor    2-partial  3-full  MOTOR                       0-nl  [0] 5: Right Arm           1-drift  [1] 6: Left Arm             2-some effort vs gravity  [0] 7: Right Leg           3-no effort vs gravity  [1] 8: Left Leg             4-no movement                             x-untestable  [0] 9: Limb Ataxia                    0-abs   1-1_limb   2-2+_limbs       x-untestable  [0] 10:Sensory                        0-nl    1-partial  2-dense  [0] 11:Best Language/Aphasia         0-nl    1-mild/mod 2-severe   3-mute  [0] 12:Dysarthria                     0-nl    1-mild/mod 2-severe       x-untestable  [0] 13:Neglect/Inattention            0-none  1-partial  2-complete  [7] TOTAL    Baseline Modified Jodie Scale (MRS): 0 = No symptoms    Objective Data:    Labs:  Lab Results   Component Value Date/Time    PROTHROMBTM 18.9 (H) 06/23/2023 11:03 AM    INR 1.68 (H) 06/23/2023 11:03 AM      Lab Results   Component Value Date/Time    WBC 5.2 06/20/2023 01:03 PM    RBC 4.15 (L) 06/20/2023 01:03 PM    HEMOGLOBIN 13.6 06/20/2023 01:03 PM    HEMATOCRIT 39.4 06/20/2023 01:03 PM    MCV 94.9 06/20/2023 01:03 PM    MCH 32.8 06/20/2023 01:03 PM    MCHC 34.5 06/20/2023 01:03 PM    MPV 9.5 06/20/2023 01:03 PM    NEUTSPOLYS 57.70 06/20/2023 01:03 PM     LYMPHOCYTES 26.10 06/20/2023 01:03 PM    MONOCYTES 13.50 (H) 06/20/2023 01:03 PM    EOSINOPHILS 1.90 06/20/2023 01:03 PM    BASOPHILS 0.40 06/20/2023 01:03 PM      Lab Results   Component Value Date/Time    SODIUM 139 06/20/2023 01:03 PM    POTASSIUM 4.5 06/20/2023 01:03 PM    CHLORIDE 101 06/20/2023 01:03 PM    CO2 26 06/20/2023 01:03 PM    GLUCOSE 107 (H) 06/20/2023 01:03 PM    BUN 21 06/20/2023 01:03 PM    CREATININE 0.79 06/20/2023 01:03 PM      No results found for: CHOLSTRLTOT, LDL, HDL, TRIGLYCERIDE    Lab Results   Component Value Date/Time    ALKPHOSPHAT 70 06/20/2023 01:03 PM    ASTSGOT 33 06/20/2023 01:03 PM    ALTSGPT 33 06/20/2023 01:03 PM    TBILIRUBIN 0.5 06/20/2023 01:03 PM        Imaging/Testing:    I interpreted and/or reviewed the patient's neuroimaging    CT-CTA HEAD WITH & W/O-POST PROCESS   Final Result   Addendum (preliminary) 1 of 1   Additional   impression: Occlusion of the petrous through proximal supraclinoid    BILATERAL internal carotid arteries. Anterior circulation may be dependent    on flow through posterior and anterior communicating arteries. This was    also discussed with Dr Sanders.      Final      RIGHT M1 occlusion      Findings were discussed with Dr Sanders on 8/12/2023 6:19 PM.         CT-CEREBRAL PERFUSION ANALYSIS   Final Result      1.Cerebral blood flow less than 30% likely representing completed infarct = 0 mL   2.T Max more than 6 seconds likely representing combination of completed infarct and ischemia = 108 mL   3. Mismatched volume likely representing ischemic brain/penumbra= 108 mL   4.Please note that the cerebral perfusion was performed on the limited brain tissue around the basal ganglia region. Infarct/ischemia outside the CT perfusion sections may not be seen on this study.      Findings were discussed with Dr Sanders on 8/12/2023 6:18 PM.      CT-HEAD W/O   Final Result      1. Cerebral atrophy.   2. White matter lucencies most consistent with  small vessel ischemic change versus demyelination or gliosis.   3. Small areas of encephalomalacia in the BILATERAL frontal vertex Otherwise, Head CT without contrast with no acute findings. No evidence of acute cerebral infarction, hemorrhage or mass lesion.         DX-CHEST-PORTABLE (1 VIEW)    (Results Pending)   CT-CTA NECK WITH & W/O-POST PROCESSING    (Results Pending)   IR-THROMBO MECHANICAL ARTERY,INIT    (Results Pending)       Assessment and Plan:  Juan Luis Rolon is a 63 year old woman with atrial fibrillation, s/p WATCHMEN, not anticoagulated, presenting with R MCA syndrome.  Stroke protocol CT revealing tandem R ICA/M1 occlusion.  CT perfusion revealing > 100cc of at-risk but potentially salvageable tissue.  Discussed with Neuro-IR and plan to proceed to cath lab for endovascular clot retrieval.  Admit to ICU post-operatively.  Will need to restart anticoagulation for long-term stroke prevention.     Problem list:  R MCA stroke  History of stroke  Atrial fibrillation  Hypertension  L ICA occlusion    Recommendations:   - to cath lab for endovascular clot retrieval of R ICA/M1 thrombus with Neuro-IR   - admit to RICU post-operatively, neurochecks/NIHSS per post-thrombectomy protocol   - expedite MRI brain without contrast, does not need to wait 24 hours   - repeat head CT only if there is clinical deterioration   - BP goal TBD pending TICI results as follows:  If TICI 3: maintain systolic -140  If TICI 2b: maintain systolic 120-160  If TICI 2a or less, maintain systolic -180   This is in an effort to minimize reperfusion injury and/or hemorrhagic conversion  - stroke labs:  HgbA1c and lipid panel  - start atorvastatin 40mg daily for goal LDL < 70 when able, titrate dose to LDL results  - no antithrombotic therapy- anticipate restarting apixaban 5mg BID, timing TBD pending MRI results  - PT/OT/SLP ok even within first 24 hours  - may defer TTE and ziopatch monitoring given known atrial  fibrillation    Addendum:  Severe stenosis of the cavernous ICA, the ICA thrombus appears to have partially lysed and migrated to A1 and R M2. There is excellent collateral flow.  Decision made to angioplasty the critical stenosis of the cavernous ICA, and to medically manage the distal occlusions.  Integrilin bolus followed by 1mcg/kg infusion started.  When able (either passes swallow or NGT placed)- transition to plavix with 300mg loading dose and ASA 325mg loading dose, followed by ASA 81mg/plavix 75mg daily x 90 days.  Maintain -160 to promote collateralization.      Patient discussed with Dr. Richard, ER attending, and Dr. Sanders, Neuro-IR attending.    Kenneth Falcon MD  Vascular Neurology

## 2023-08-14 DIAGNOSIS — I65.21 STENOSIS OF RIGHT CAROTID ARTERY: ICD-10-CM

## 2023-08-14 LAB
ANION GAP SERPL CALC-SCNC: 9 MMOL/L (ref 7–16)
BASOPHILS # BLD AUTO: 0.2 % (ref 0–1.8)
BASOPHILS # BLD: 0.01 K/UL (ref 0–0.12)
BUN SERPL-MCNC: 6 MG/DL (ref 8–22)
CALCIUM SERPL-MCNC: 9.1 MG/DL (ref 8.5–10.5)
CHLORIDE SERPL-SCNC: 107 MMOL/L (ref 96–112)
CO2 SERPL-SCNC: 23 MMOL/L (ref 20–33)
CREAT SERPL-MCNC: 0.7 MG/DL (ref 0.5–1.4)
EOSINOPHIL # BLD AUTO: 0.08 K/UL (ref 0–0.51)
EOSINOPHIL NFR BLD: 1.6 % (ref 0–6.9)
ERYTHROCYTE [DISTWIDTH] IN BLOOD BY AUTOMATED COUNT: 41.6 FL (ref 35.9–50)
GFR SERPLBLD CREATININE-BSD FMLA CKD-EPI: 96 ML/MIN/1.73 M 2
GLUCOSE SERPL-MCNC: 109 MG/DL (ref 65–99)
HCT VFR BLD AUTO: 34.8 % (ref 37–47)
HGB BLD-MCNC: 12 G/DL (ref 12–16)
IMM GRANULOCYTES # BLD AUTO: 0.01 K/UL (ref 0–0.11)
IMM GRANULOCYTES NFR BLD AUTO: 0.2 % (ref 0–0.9)
LYMPHOCYTES # BLD AUTO: 1 K/UL (ref 1–4.8)
LYMPHOCYTES NFR BLD: 19.8 % (ref 22–41)
MAGNESIUM SERPL-MCNC: 2.1 MG/DL (ref 1.5–2.5)
MCH RBC QN AUTO: 32.3 PG (ref 27–33)
MCHC RBC AUTO-ENTMCNC: 34.5 G/DL (ref 32.2–35.5)
MCV RBC AUTO: 93.8 FL (ref 81.4–97.8)
MONOCYTES # BLD AUTO: 0.61 K/UL (ref 0–0.85)
MONOCYTES NFR BLD AUTO: 12.1 % (ref 0–13.4)
NEUTROPHILS # BLD AUTO: 3.33 K/UL (ref 1.82–7.42)
NEUTROPHILS NFR BLD: 66.1 % (ref 44–72)
NRBC # BLD AUTO: 0 K/UL
NRBC BLD-RTO: 0 /100 WBC (ref 0–0.2)
PHOSPHATE SERPL-MCNC: 4 MG/DL (ref 2.5–4.5)
PLATELET # BLD AUTO: 253 K/UL (ref 164–446)
PMV BLD AUTO: 9 FL (ref 9–12.9)
POTASSIUM SERPL-SCNC: 4.2 MMOL/L (ref 3.6–5.5)
RBC # BLD AUTO: 3.71 M/UL (ref 4.2–5.4)
SODIUM SERPL-SCNC: 139 MMOL/L (ref 135–145)
WBC # BLD AUTO: 5 K/UL (ref 4.8–10.8)

## 2023-08-14 PROCEDURE — 700102 HCHG RX REV CODE 250 W/ 637 OVERRIDE(OP): Performed by: NURSE PRACTITIONER

## 2023-08-14 PROCEDURE — 700102 HCHG RX REV CODE 250 W/ 637 OVERRIDE(OP): Performed by: INTERNAL MEDICINE

## 2023-08-14 PROCEDURE — 97162 PT EVAL MOD COMPLEX 30 MIN: CPT

## 2023-08-14 PROCEDURE — 770001 HCHG ROOM/CARE - MED/SURG/GYN PRIV*

## 2023-08-14 PROCEDURE — 85025 COMPLETE CBC W/AUTO DIFF WBC: CPT

## 2023-08-14 PROCEDURE — 83735 ASSAY OF MAGNESIUM: CPT

## 2023-08-14 PROCEDURE — 97166 OT EVAL MOD COMPLEX 45 MIN: CPT

## 2023-08-14 PROCEDURE — 84100 ASSAY OF PHOSPHORUS: CPT

## 2023-08-14 PROCEDURE — A9270 NON-COVERED ITEM OR SERVICE: HCPCS | Performed by: NURSE PRACTITIONER

## 2023-08-14 PROCEDURE — 99232 SBSQ HOSP IP/OBS MODERATE 35: CPT | Performed by: PSYCHIATRY & NEUROLOGY

## 2023-08-14 PROCEDURE — 99223 1ST HOSP IP/OBS HIGH 75: CPT | Performed by: HOSPITALIST

## 2023-08-14 PROCEDURE — 80048 BASIC METABOLIC PNL TOTAL CA: CPT

## 2023-08-14 PROCEDURE — A9270 NON-COVERED ITEM OR SERVICE: HCPCS | Performed by: INTERNAL MEDICINE

## 2023-08-14 PROCEDURE — 700102 HCHG RX REV CODE 250 W/ 637 OVERRIDE(OP): Performed by: HOSPITALIST

## 2023-08-14 PROCEDURE — A9270 NON-COVERED ITEM OR SERVICE: HCPCS | Performed by: HOSPITALIST

## 2023-08-14 PROCEDURE — 99233 SBSQ HOSP IP/OBS HIGH 50: CPT | Performed by: INTERNAL MEDICINE

## 2023-08-14 PROCEDURE — 700111 HCHG RX REV CODE 636 W/ 250 OVERRIDE (IP): Performed by: NURSE PRACTITIONER

## 2023-08-14 RX ORDER — LOSARTAN POTASSIUM 50 MG/1
25 TABLET ORAL DAILY
Status: DISCONTINUED | OUTPATIENT
Start: 2023-08-14 | End: 2023-08-15 | Stop reason: HOSPADM

## 2023-08-14 RX ORDER — AMLODIPINE BESYLATE 5 MG/1
2.5 TABLET ORAL DAILY
Status: DISCONTINUED | OUTPATIENT
Start: 2023-08-14 | End: 2023-08-15 | Stop reason: HOSPADM

## 2023-08-14 RX ORDER — ROSUVASTATIN CALCIUM 20 MG/1
20 TABLET, COATED ORAL EVERY EVENING
Status: DISCONTINUED | OUTPATIENT
Start: 2023-08-14 | End: 2023-08-15 | Stop reason: HOSPADM

## 2023-08-14 RX ADMIN — ASPIRIN 81 MG 81 MG: 81 TABLET ORAL at 06:15

## 2023-08-14 RX ADMIN — LABETALOL HYDROCHLORIDE 10 MG: 5 INJECTION INTRAVENOUS at 08:10

## 2023-08-14 RX ADMIN — AMLODIPINE BESYLATE 2.5 MG: 5 TABLET ORAL at 08:34

## 2023-08-14 RX ADMIN — TICAGRELOR 90 MG: 90 TABLET ORAL at 06:15

## 2023-08-14 RX ADMIN — TICAGRELOR 90 MG: 90 TABLET ORAL at 17:02

## 2023-08-14 RX ADMIN — SENNOSIDES AND DOCUSATE SODIUM 2 TABLET: 50; 8.6 TABLET ORAL at 17:02

## 2023-08-14 RX ADMIN — ACETAMINOPHEN 650 MG: 325 TABLET, FILM COATED ORAL at 19:39

## 2023-08-14 RX ADMIN — Medication 10 MG: at 21:02

## 2023-08-14 RX ADMIN — ROSUVASTATIN CALCIUM 20 MG: 20 TABLET, FILM COATED ORAL at 17:02

## 2023-08-14 RX ADMIN — LOSARTAN POTASSIUM 25 MG: 50 TABLET, FILM COATED ORAL at 08:34

## 2023-08-14 ASSESSMENT — ENCOUNTER SYMPTOMS
COUGH: 0
SENSORY CHANGE: 0
NAUSEA: 0
FEVER: 0
PALPITATIONS: 0
SHORTNESS OF BREATH: 0
STRIDOR: 0
DIZZINESS: 0
BLURRED VISION: 0
WEAKNESS: 0
ABDOMINAL PAIN: 0
CHILLS: 0
SPUTUM PRODUCTION: 0
VOMITING: 0
HEADACHES: 0
FOCAL WEAKNESS: 0
MYALGIAS: 0
FOCAL WEAKNESS: 1
SPEECH CHANGE: 0
TINGLING: 0

## 2023-08-14 ASSESSMENT — PAIN DESCRIPTION - PAIN TYPE
TYPE: ACUTE PAIN

## 2023-08-14 ASSESSMENT — FIBROSIS 4 INDEX: FIB4 SCORE: 1.57

## 2023-08-14 ASSESSMENT — COGNITIVE AND FUNCTIONAL STATUS - GENERAL
CLIMB 3 TO 5 STEPS WITH RAILING: A LITTLE
WALKING IN HOSPITAL ROOM: A LITTLE
STANDING UP FROM CHAIR USING ARMS: A LITTLE
DAILY ACTIVITIY SCORE: 24
MOBILITY SCORE: 21
SUGGESTED CMS G CODE MODIFIER DAILY ACTIVITY: CH
SUGGESTED CMS G CODE MODIFIER MOBILITY: CJ

## 2023-08-14 ASSESSMENT — GAIT ASSESSMENTS
DISTANCE (FEET): 250
DEVIATION: NO DEVIATION
GAIT LEVEL OF ASSIST: SUPERVISED

## 2023-08-14 ASSESSMENT — ACTIVITIES OF DAILY LIVING (ADL): TOILETING: INDEPENDENT

## 2023-08-14 NOTE — THERAPY
Physical Therapy   Initial Evaluation     Patient Name: Juan Luis Rolon  Age:  63 y.o., Sex:  female  Medical Record #: [id number]  Today's Date: 8/14/2023     Precautions  Precautions: Fall Risk;Swallow Precautions (per SLP)  Comments: -160    Assessment  Patient is 63 y.o. female POD2 ICA angioplasty d/t R MCA syndrome. PMH includes: arrhythmia, HTN, stroke in 2011. Pt SBP precautions 120-160. BP pre-activity within parameters, BP post-activity 165/90 and RN notified. Pt function at baseline. Pt educated on potential discharge planning. Pt does not need acute care PT at this time.     Plan    Physical Therapy Initial Treatment Plan   Duration: Evaluation only    DC Equipment Recommendations: None  Discharge Recommendations: Anticipate that the patient will have no further physical therapy needs after discharge from the hospital     Objective       08/14/23 0835   Initial Contact Note    Initial Contact Note Order Received and Verified, Evaluation Only - Patient Does Not Require Further Acute Physical Therapy at this Time.  However, May Benefit from Post Acute Therapy for Higher Level Functional Deficits.   Precautions   Precautions Fall Risk;Swallow Precautions   Comments -160   Vitals   Patient BP Position Sitting   Blood Pressure  (!) 165/90   O2 (LPM) 0   O2 Delivery Device None - Room Air   Vitals Comments BP pre-activity within parameters. BP assessed post-activity. RN notified d/t outside parameters   Pain 0 - 10 Group   Therapist Pain Assessment Post Activity Pain Same as Prior to Activity;Nurse Notified;0  (Pt reports no pain)   Prior Living Situation   Prior Services None   Housing / Facility 2 Story House   Steps Into Home 3   Steps In Home   (1 flight of stairs)   Rail None   Equipment Owned None   Lives with - Patient's Self Care Capacity Alone and Able to Care For Self   Comments Pt states she lives in [place], [place]. Pt states she can stay downstairs if needed. Pt states neighbors can assist  as needed.   Prior Level of Functional Mobility   Bed Mobility Independent   Transfer Status Independent   Ambulation Independent   Ambulation Distance Community   Assistive Devices Used None   Stairs Independent   History of Falls   History of Falls Yes   Date of Last Fall 08/12/23   Cognition    Cognition / Consciousness WDL   Level of Consciousness Alert   Active ROM Lower Body    Active ROM Lower Body  WDL   Strength Lower Body   Lower Body Strength  WDL   Sensation Lower Body   Lower Extremity Sensation   WDL   Coordination Lower Body    Coordination Lower Body  WDL   Other Treatments   Other Treatments Provided Pt educated on potential discharge planning   Balance Assessment   Sitting Balance (Static) Fair +   Sitting Balance (Dynamic) Fair +   Standing Balance (Static) Fair +   Standing Balance (Dynamic) Fair +   Weight Shift Sitting Good   Weight Shift Standing Good   Comments Pt did not use AD when standing   Bed Mobility    Supine to Sit Supervised   Sit to Supine   (not tested - finished in bed)   Scooting Supervised   Rolling Supervised   Gait Analysis   Gait Level Of Assist Supervised   Assistive Device None   Distance (Feet) 250   # of Times Distance was Traveled 1   Deviation No deviation   Weight Bearing Status No restrictions   Comments Pt ambulated no AD   Functional Mobility   Sit to Stand Supervised   Bed, Chair, Wheelchair Transfer Supervised   Transfer Method Stand Step   Mobility In room and hallway   Comments Pt did not use AD, ended session in chair   ICU Target Mobility Level   ICU Mobility - Targeted Level Level 4   How much difficulty does the patient currently have...   Turning over in bed (including adjusting bedclothes, sheets and blankets)? 4   Sitting down on and standing up from a chair with arms (e.g., wheelchair, bedside commode, etc.) 4   Moving from lying on back to sitting on the side of the bed? 4   How much help from another person does the patient currently need...   Moving  to and from a bed to a chair (including a wheelchair)? 3   Need to walk in a hospital room? 3   Climbing 3-5 steps with a railing? 3   6 clicks Mobility Score 21   Education Group   Education Provided Role of Physical Therapist   Role of Physical Therapist Patient Response Patient;Eager;Explanation;Demonstration;Verbal Demonstration;Action Demonstration   Additional Comments Pt educated on potential discharge planning   Physical Therapy Initial Treatment Plan    Duration Evaluation only   Anticipated Discharge Equipment and Recommendations   DC Equipment Recommendations None   Discharge Recommendations Anticipate that the patient will have no further physical therapy needs after discharge from the hospital   Interdisciplinary Plan of Care Collaboration   IDT Collaboration with  Nursing;Occupational Therapist   Patient Position at End of Therapy Seated;Chair Alarm On;Call Light within Reach;Tray Table within Reach;Phone within Reach   Collaboration Comments RN updated   Session Information   Date / Session Number  8/14 - 1x   ICU Mobility   Distance per occurrence (ft.) 250   Number of times traveled 1

## 2023-08-14 NOTE — DISCHARGE PLANNING
[facility] Transitional Care Coordination    Referral from: Dr. Owens    Insurance Provider on Facesheet: ALLYSON/ANT    Potential Rehab Diagnosis: CVA    Chart review indicates patient may have on going medical management and may have therapy needs to possibly meet inpatient rehab facility criteria with the goal of returning to community.    D/C support will need to be verified: Daughter    Physiatry consultation pended per protocol.  TX pending.     Thank you for the referral.

## 2023-08-14 NOTE — ASSESSMENT & PLAN NOTE
History of paroxysmal atrial fibrillation status post ablation in June 2023  She has not been on anticoagulation  Continuous telemetry monitoring to evaluate for recurrence

## 2023-08-14 NOTE — ASSESSMENT & PLAN NOTE
History of hypertension for which she is on Norvasc, atenolol, Cozaar, Aldactone, hydrochlorothiazide, Hytrin as an outpatient.  She required an IV nicardipine drip.  Home meds will be restarted accordingly and titrated up

## 2023-08-14 NOTE — PROGRESS NOTES
"Critical Care Progress Note    Date of admission  8/12/2023    Chief Complaint  63 y.o. female admitted 8/12/2023 with CVA    Hospital Course  \"63 y.o. female with a history of Afib s/p ablation by Jose 6/23/23, HTN, HLD, TIAs who presented 8/12/2023 with L sided weakness. Reportedly her symptoms began at 1530 and were associated with a HA. NIHSS 7; CTH without hemorrhage, CTA with R ICA/M1 thrombus. IV TNK was not delivered; she was taken for emergent thrombectomy. She states she was in her prior state of health, reports medication compliance; no infectious s/s. Critical care was consulted to manage her care following emergent thrombectomy. \"    8/13 -    continue Integrilin infusion.  Loaded with aspirin and ticagrelor.  Stop Integrilin infusion after appropriate bridge time.  Titrating nicardipine.  Continue neuro checks.    Interval Problem Update  Reviewed last 24 hour events:   - No acute events overnight   - Neuro: NIHSS 1, LLE weakness   - HR: 60s-90s   - SBP: 120s-180s   - GI: tolerating diet, last BM PTA   - UOP: not recorded   - Love: no   - Tm: 36.7   - Lines: PIV x2   - PPx: GI not indicated, DVT pending MRI   - RA   - CXR (personally reviewed and compared to prior): no new   - Restart home BP meds    Review of Systems  Review of Systems   Constitutional:  Negative for chills and fever.   Eyes:  Negative for blurred vision.   Respiratory:  Negative for cough, sputum production, shortness of breath and stridor.    Cardiovascular:  Negative for chest pain.   Gastrointestinal:  Negative for abdominal pain, nausea and vomiting.   Genitourinary:  Negative for dysuria.   Musculoskeletal:  Negative for myalgias.   Skin:  Negative for rash.   Neurological:  Positive for focal weakness. Negative for dizziness and sensory change.        Vital Signs for last 24 hours   Temp:  [36.3 °C (97.4 °F)-36.7 °C (98 °F)] 36.4 °C (97.5 °F)  Pulse:  [] 67  Resp:  [14-77] 17  BP: (128-183)/(73-95) 141/84  SpO2:  [87 " %-98 %] 93 %    Hemodynamic parameters for last 24 hours       Respiratory Information for the last 24 hours       Physical Exam   Physical Exam  Vitals and nursing note reviewed.   Constitutional:       Appearance: She is ill-appearing.   HENT:      Head: Normocephalic and atraumatic.      Right Ear: External ear normal.      Left Ear: External ear normal.      Nose: Nose normal.      Mouth/Throat:      Mouth: Mucous membranes are moist.      Pharynx: Oropharynx is clear.   Eyes:      Extraocular Movements: Extraocular movements intact.      Conjunctiva/sclera: Conjunctivae normal.   Cardiovascular:      Rate and Rhythm: Normal rate and regular rhythm.      Pulses: Normal pulses.   Pulmonary:      Effort: Pulmonary effort is normal. No respiratory distress.      Breath sounds: No stridor. No wheezing.   Abdominal:      General: Bowel sounds are normal.      Palpations: Abdomen is soft.   Musculoskeletal:         General: Normal range of motion.      Cervical back: Neck supple.   Skin:     General: Skin is warm and dry.      Capillary Refill: Capillary refill takes less than 2 seconds.   Neurological:      Mental Status: She is alert and oriented to person, place, and time.      Cranial Nerves: No cranial nerve deficit.      Sensory: No sensory deficit.      Motor: Weakness (LLE 4/5) present.   Psychiatric:         Mood and Affect: Mood normal.         Behavior: Behavior normal.         Medications  Current Facility-Administered Medications   Medication Dose Route Frequency Provider Last Rate Last Admin    MD Alert...ICU Electrolyte Replacement per Pharmacy   Other PHARMACY TO DOSE Angel Wheeler M.D.        HYDROmorphone (Dilaudid) injection 0.25-0.5 mg  0.25-0.5 mg Intravenous Q3HRS PRN Angel Wheeler M.D.        aspirin (Asa) chewable tab 81 mg  81 mg Oral DAILY Angel Wheeler M.D.   81 mg at 08/14/23 0615    ticagrelor (Brilinta) tablet 90 mg  90 mg Oral BID Angel Wheeler M.D.    90 mg at 08/14/23 0615    labetalol (Normodyne/Trandate) injection 10 mg  10 mg Intravenous Q4HRS PRN Kristin CAMPOVERDE Latona        hydrALAZINE (Apresoline) injection 10 mg  10 mg Intravenous Q4HRS PRN Kristin CAMPOVERDE Latliz        melatonin tablet 10 mg  10 mg Oral Nightly Kristin LDen Latona   10 mg at 08/13/23 2152    acetaminophen (Tylenol) tablet 650 mg  650 mg Oral Q6HRS PRN Kenneth Owens M.D.   650 mg at 08/13/23 2316    ondansetron (Zofran) syringe/vial injection 4 mg  4 mg Intravenous Q4HRS PRN Kennteh Owens M.D.        ondansetron (Zofran ODT) dispertab 4 mg  4 mg Oral Q4HRS PRN Kenneth Owens M.D.        senna-docusate (Pericolace Or Senokot S) 8.6-50 MG per tablet 2 Tablet  2 Tablet Oral BID Kenneth Owens M.D.        And    polyethylene glycol/lytes (Miralax) PACKET 1 Packet  1 Packet Oral QDAY PRN Kenneth Owens M.D.        And    magnesium hydroxide (Milk Of Magnesia) suspension 30 mL  30 mL Oral QDAY PRN Kenneth Owens M.D.        And    bisacodyl (Dulcolax) suppository 10 mg  10 mg Rectal QDAY PRN Kenneth Owens M.D.        norepinephrine (Levophed) 8 mg in 250 mL NS infusion (premix)  0-1 mcg/kg/min (Ideal) Intravenous Continuous Kenneth Owens M.D.   Held at 08/12/23 2030    niCARdipine (Cardene) 25 mg in  mL Standard Infusion  0-15 mg/hr Intravenous Continuous Kenneth Owens M.D.   Stopped at 08/14/23 0105    acetaminophen (Tylenol) suppository 650 mg  650 mg Rectal Q4HRS PRN Krisitn Agudelo           Fluids    Intake/Output Summary (Last 24 hours) at 8/14/2023 0637  Last data filed at 8/14/2023 0200  Gross per 24 hour   Intake 2266.77 ml   Output 2100 ml   Net 166.77 ml       Laboratory          Recent Labs     08/12/23  1817 08/13/23  0320 08/14/23  0435   SODIUM 132* 139 139   POTASSIUM 4.0 3.4* 4.2   CHLORIDE 98 103 107   CO2 24 24 23   BUN 12 9 6*   CREATININE 0.68 0.66 0.70   MAGNESIUM  --  1.8 2.1   PHOSPHORUS  --  3.2 4.0   CALCIUM 9.0 9.3 9.1     Recent Labs      08/12/23 1817 08/13/23 0320 08/14/23  0435   ALTSGPT 25 24  --    ASTSGOT 35 30  --    ALKPHOSPHAT 59 54  --    TBILIRUBIN 0.4 0.6  --    GLUCOSE 117* 114* 109*     Recent Labs     08/12/23 1817 08/13/23  0320 08/14/23  0435   WBC 9.5 5.4 5.0   NEUTSPOLYS 86.50* 66.80 66.10   LYMPHOCYTES 7.00* 21.00* 19.80*   MONOCYTES 5.60 11.10 12.10   EOSINOPHILS 0.20 0.70 1.60   BASOPHILS 0.10 0.20 0.20   ASTSGOT 35 30  --    ALTSGPT 25 24  --    ALKPHOSPHAT 59 54  --    TBILIRUBIN 0.4 0.6  --      Recent Labs     08/12/23 1817 08/13/23 0320 08/14/23  0435   RBC 3.86* 3.56* 3.71*   HEMOGLOBIN 12.4 11.5* 12.0   HEMATOCRIT 35.7* 33.6* 34.8*   PLATELETCT 293 264 253   PROTHROMBTM 13.7  --   --    APTT 26.5  --   --    INR 1.06  --   --        Imaging  CT:    Reviewed    Assessment/Plan  * Acute ischemic stroke (HCC)- (present on admission)  Assessment & Plan  Acute right MCA ischemic stroke  CTA with critical right ICA stenosis with right M1 occlusion as well as occlusion of the left ICA  She did not received tenecteplase  S/P emergent IR intervention with right ICA angioplasty - subsequent thrombus in right M2 and distal right A1 segments  On Integrilin infusion - load with aspirin and ticagrelor and stop Integrilin infusion after appropriate bridge time  Strict blood pressure control with systolic blood pressure goal of 120-160  Nicardipine -> oral meds  Neuro checks every 4 hours  Echocardiogram, lipid panel, glycohemoglobin  MRI brain pending    Hypomagnesemia- (present on admission)  Assessment & Plan  Replete magnesium    Hypokalemia- (present on admission)  Assessment & Plan  Replete potassium    Atrial fibrillation (HCC)- (present on admission)  Assessment & Plan  S/P left atrial appendage closure with Watchman device on 6/13/2023  S/P ablation on 6/23/2023  She is currently in sinus rhythm  Optimize potassium and magnesium    Transient ischemic attack- (present on admission)  Assessment & Plan  History  of    Dyslipidemia- (present on admission)  Assessment & Plan  History of allergy to atorvastatin    Primary hypertension- (present on admission)  Assessment & Plan  Strict blood pressure control with goal -160  Off nicardipine, restart home meds         VTE:  Contraindicated  Ulcer: Not Indicated  Lines: None    I have performed a physical exam and reviewed and updated ROS and Plan today (8/14/2023). In review of yesterday's note (8/13/2023), there are no changes except as documented above.     Discussed patient condition and risk of morbidity and/or mortality with Hospitalist, RN, RT, Pharmacy, Code status disscussed, Charge nurse / hot rounds, Patient, and neurology    Patient stable to be transferred out of ICU today to neuro.  I have discussed this case with hospitalist Dr. Marcelino, he will assume care at this time. [facility] will sign off. Please call with any questions.    Please note that this dictation was created using voice recognition software. The accuracy of the dictation is limited to the abilities of the software. I have made every reasonable attempt to correct obvious errors, but I expect that there are errors of grammar and possibly content that I did not discover before finalizing the note.

## 2023-08-14 NOTE — PROGRESS NOTES
Radiology Progress Note   Author: EVANGELINA Dinh Date & Time created: 8/14/2023  8:52 AM   Date of admission  8/12/2023  Note to reader: this note follows the APSO format rather than the historical SOAP format. Assessment and plan located at the top of the note for ease of use.    Chief Complaint  63 y.o. female admitted 8/12/2023 with   Chief Complaint   Patient presents with    Possible Stroke     Pt rosamaria Pineda from home, pt went to bathroom and could not get her pants down, pt ended up on the floor, pt called neighbor to call 911, symptoms started at 1530, pt arrived with facial drop in left side, weakness left arm and leg, pt has hx of TIA, ablation for afib on 6/23/23, bp 193/111, HR 84, pt was having headache behind right eye this morning with spots         HPI  62 yo female with pmh of primary hypertension, TIA, dyslipidemia, and atrial fibrillation s/p left atrial appendage closure with a Watchman device admitted with Right MCA acute ischemic stroke. Pt underwent a Right ICA angioplasty with Dr Sanders on 08/12/23. Residual thrombus and occlusion in the right M2 segment as well as nonflow limiting residual thrombus in the distal right A1 segment. She was started on Integrilin and bridged over to Brilinta post procedure. Pt allergic to Plavix.     Interval History:   08/14/23 - Post procedure CT on 08/13 shows no hemorraghic conversion and no mass effect/midline shift. Pt feeling good overall. Neuro exam intact.     Assessment/Plan     Principal Problem:    Acute ischemic stroke (HCC)  Active Problems:    Primary hypertension    Dyslipidemia    Transient ischemic attack    Atrial fibrillation (HCC)    Hypokalemia    Hypomagnesemia      Plan IR  - Post-angioseal instructions: no lifting greater than 5 lbs and no baths/ swimming/ soaking in tub for 5 days. Shower OK. OK to change dressings/band aid as needed.  - Continue Brilinta 90 mg BID and aspirin 81 mg at least 90 days post procedure.     - Follow up appointment in 1 month with OP Neuro IR, our office to call and schedule  - Neuro Checks per Neurology   - SBP goal per neurology.    - From a Neuro Interventional Service standpoint, OK to discharge to home when medically cleared by Hospitalist Service.   -Thank you for allowing Interventional Radiology team to participate in the patients care, if any additional care or requests are needed in the future please do not hesitate call or place IR order   [phone]           Review of Systems  Physical Exam   Review of Systems   Constitutional:  Negative for chills and fever.   Respiratory:  Negative for shortness of breath.    Cardiovascular:  Negative for chest pain and palpitations.   Gastrointestinal:  Negative for nausea and vomiting.   Neurological:  Negative for tingling, sensory change, speech change, weakness and headaches.      Vitals:    08/14/23 0834   BP: (!) 155/99   Pulse:    Resp:    Temp:    SpO2:         Physical Exam  Cardiovascular:      Rate and Rhythm: Normal rate.   Pulmonary:      Effort: Pulmonary effort is normal.   Abdominal:      Palpations: Abdomen is soft.   Skin:     General: Skin is warm and dry.      Comments: Right groin access site soft, non-tender, dressing cdi.    Neurological:      General: No focal deficit present.      Mental Status: She is alert and oriented to person, place, and time.      Cranial Nerves: No cranial nerve deficit.      Sensory: No sensory deficit.      Motor: No weakness.   Psychiatric:         Mood and Affect: Mood normal.             Labs    Recent Labs     08/12/23 1817 08/13/23  0320 08/14/23  0435   WBC 9.5 5.4 5.0   RBC 3.86* 3.56* 3.71*   HEMOGLOBIN 12.4 11.5* 12.0   HEMATOCRIT 35.7* 33.6* 34.8*   MCV 92.5 94.4 93.8   MCH 32.1 32.3 32.3   MCHC 34.7 34.2 34.5   RDW 40.2 41.6 41.6   PLATELETCT 293 264 253   MPV 9.0 9.1 9.0     Recent Labs     08/12/23 1817 08/13/23  0320 08/14/23  0435   SODIUM 132* 139 139   POTASSIUM 4.0 3.4* 4.2   CHLORIDE  98 103 107   CO2 24 24 23   GLUCOSE 117* 114* 109*   BUN 12 9 6*   CREATININE 0.68 0.66 0.70   CALCIUM 9.0 9.3 9.1     Recent Labs     08/12/23  1817 08/13/23  0320 08/14/23  0435   ALBUMIN 4.0 4.0  --    TBILIRUBIN 0.4 0.6  --    ALKPHOSPHAT 59 54  --    TOTPROTEIN 6.3 6.2  --    ALTSGPT 25 24  --    ASTSGOT 35 30  --    CREATININE 0.68 0.66 0.70     CT-HEAD W/O   Final Result         1.  Mild brain swelling and edema noted in the right frontal area consistent with subacute infarct.      2.  No significant mass effect or midline shift.      3.  No intracranial hemorrhage identified.               EC-ECHOCARDIOGRAM LTD W/O CONT   Final Result      DX-CHEST-PORTABLE (1 VIEW)   Final Result      No acute cardiopulmonary abnormality.      IR-THROMBO MECHANICAL ARTERY,INIT   Final Result      1.  Severe stenosis of cavernous segment of the right internal carotid artery likely representing acute plaque rupture.   2.  Successful angioplasty of the atherosclerotic stenosis with moderate improvement.   3.  Residual thrombus and occlusion in the right M2 segment   4.  Nonflow limiting residual thrombus in the distal right A1 segment.      CT-CTA NECK WITH & W/O-POST PROCESSING   Final Result      1.  Occlusion or severe occlusion of the RIGHT internal carotid artery.   2.  Occlusion of the LEFT internal carotid artery.      Findings were discussed with Dr Sanders on 8/12/2023 6:25 PM.      CT-CTA HEAD WITH & W/O-POST PROCESS   Final Result   Addendum (preliminary) 1 of 1   Additional   impression: Occlusion of the petrous through proximal supraclinoid    BILATERAL internal carotid arteries. Anterior circulation may be dependent    on flow through posterior and anterior communicating arteries. This was    also discussed with Dr Sanders.      Final      RIGHT M1 occlusion      Findings were discussed with Dr Sanders on 8/12/2023 6:19 PM.         CT-CEREBRAL PERFUSION ANALYSIS   Final Result      1.Cerebral blood flow  less than 30% likely representing completed infarct = 0 mL   2.T Max more than 6 seconds likely representing combination of completed infarct and ischemia = 108 mL   3. Mismatched volume likely representing ischemic brain/penumbra= 108 mL   4.Please note that the cerebral perfusion was performed on the limited brain tissue around the basal ganglia region. Infarct/ischemia outside the CT perfusion sections may not be seen on this study.      Findings were discussed with Dr Sanders on 8/12/2023 6:18 PM.      CT-HEAD W/O   Final Result      1. Cerebral atrophy.   2. White matter lucencies most consistent with small vessel ischemic change versus demyelination or gliosis.   3. Small areas of encephalomalacia in the BILATERAL frontal vertex Otherwise, Head CT without contrast with no acute findings. No evidence of acute cerebral infarction, hemorrhage or mass lesion.         MR-BRAIN-W/O    (Results Pending)       INR   Date Value Ref Range Status   08/12/2023 1.06 0.87 - 1.13 Final     Comment:     INR - Non-therapeutic Reference Range: 0.87-1.13  INR - Therapeutic Reference Range: 2.0-4.0       No results found for: POCINR     Intake/Output Summary (Last 24 hours) at 8/14/2023 0852  Last data filed at 8/14/2023 0600  Gross per 24 hour   Intake 2212.23 ml   Output 2100 ml   Net 112.23 ml      Labs not explicitly included in this progress note were reviewed by the author. Radiology/imaging not explicitly included in this progress note was reviewed by the author.     I have performed a physical exam and reviewed and updated ROS and Plan today (8/14/2023).     45 minutes in directly providing and coordinating care and extensive data review.  No time overlap and excludes procedures.

## 2023-08-14 NOTE — CARE PLAN
The patient is Stable - Low risk of patient condition declining or worsening    Shift Goals  Clinical Goals: /160  Patient Goals: Sleep  Family Goals: MARCELLO    Progress made toward(s) clinical / shift goals:    Patient's SBP remained within goal range pf 120-160 mmHg, administered PRN dose of labetalol to manage (see MAR). Q2H neuro checks performed, patient's neuro status remained stable. Patient and family updated and educated on plan of care.      Problem: Optimal Care of the Stroke Patient  Goal: Optimal emergency care for the stroke patient  Outcome: Progressing     Problem: Knowledge Deficit - Stroke Education  Goal: Patient's knowledge of stroke and risk factors will improve  Outcome: Progressing     Problem: Neuro Status  Goal: Neuro status will remain stable or improve  Outcome: Progressing     Problem: Hemodynamic Monitoring  Goal: Patient's hemodynamics, fluid balance and neurologic status will be stable or improve  Outcome: Progressing     Problem: Self Care  Goal: Patient will have the ability to perform ADLs independently or with assistance (bathe, groom, dress, toilet and feed)  Outcome: Progressing     Problem: Fall Risk  Goal: Patient will remain free from falls  Outcome: Progressing

## 2023-08-14 NOTE — PROGRESS NOTES
Neurology Progress Note  Neurohospitalist Service, [facility]    Referring Physician: Hardy Herrera Jr., D.O.    Chief Complaint   Patient presents with    Possible Stroke     Pt rosamaria Pineda from home, pt went to bathroom and could not get her pants down, pt ended up on the floor, pt called neighbor to call 911, symptoms started at 1530, pt arrived with facial drop in left side, weakness left arm and leg, pt has hx of TIA, ablation for afib on 6/23/23, bp 193/111, HR 84, pt was having headache behind right eye this morning with spots       HPI: Refer to initial documented Neurology H&P, as detailed in the patient's chart.    Interval History: No acute events overnight.  No new complaints, no significant changes.  Remains a stable with no focal neurological deficit.    Review of systems: In addition to what is detailed in the HPI and/or updated in the interval history, all other systems reviewed and are negative.    Past Medical History:    has a past medical history of Arrhythmia (12/23/2022), Hypertension, and Stroke (HCC) (5/11/2011).    FHx:  family history is not on file.    SHx:   reports that she has never smoked. She has never used smokeless tobacco. She reports current alcohol use of about 3.5 oz of alcohol per week. She reports that she does not use drugs.    Medications:    Current Facility-Administered Medications:     losartan (Cozaar) tablet 25 mg, 25 mg, Oral, DAILY, Hardy Herrera Jr. D.O., 25 mg at 08/14/23 0834    amLODIPine (Norvasc) tablet 2.5 mg, 2.5 mg, Oral, DAILY, Hardy Herrera Jr. D.O., 2.5 mg at 08/14/23 0834    MD Alert...ICU Electrolyte Replacement per Pharmacy, , Other, PHARMACY TO DOSE, Angel Wheeler M.D.    HYDROmorphone (Dilaudid) injection 0.25-0.5 mg, 0.25-0.5 mg, Intravenous, Q3HRS PRN, Angel Wheeler M.D.    [COMPLETED] aspirin (Asa) tablet 325 mg, 325 mg, Oral, Once, 325 mg at 08/13/23 1440 **FOLLOWED BY** aspirin (Asa) chewable tab  "81 mg, 81 mg, Oral, DAILY, Angel Wheeler M.D., 81 mg at 08/14/23 0615    [COMPLETED] ticagrelor (Brilinta) tablet 180 mg, 180 mg, Oral, Once, 180 mg at 08/13/23 1440 **FOLLOWED BY** ticagrelor (Brilinta) tablet 90 mg, 90 mg, Oral, BID, Angel Wheeler M.D., 90 mg at 08/14/23 0615    labetalol (Normodyne/Trandate) injection 10 mg, 10 mg, Intravenous, Q4HRS PRN, Kristin L. Latona, 10 mg at 08/14/23 0810    hydrALAZINE (Apresoline) injection 10 mg, 10 mg, Intravenous, Q4HRS PRN, Kristin L. Latona    melatonin tablet 10 mg, 10 mg, Oral, Nightly, Kristin L. Latona, 10 mg at 08/13/23 2152    acetaminophen (Tylenol) tablet 650 mg, 650 mg, Oral, Q6HRS PRN, Kenneth Owens M.D., 650 mg at 08/13/23 2316    ondansetron (Zofran) syringe/vial injection 4 mg, 4 mg, Intravenous, Q4HRS PRN, Kenneth Owens M.D.    ondansetron (Zofran ODT) dispertab 4 mg, 4 mg, Oral, Q4HRS PRN, Kenneth Owens M.D.    senna-docusate (Pericolace Or Senokot S) 8.6-50 MG per tablet 2 Tablet, 2 Tablet, Oral, BID **AND** polyethylene glycol/lytes (Miralax) PACKET 1 Packet, 1 Packet, Oral, QDAY PRN **AND** magnesium hydroxide (Milk Of Magnesia) suspension 30 mL, 30 mL, Oral, QDAY PRN **AND** bisacodyl (Dulcolax) suppository 10 mg, 10 mg, Rectal, QDAY PRN, Kenneth Owens M.D.    niCARdipine (Cardene) 25 mg in  mL Standard Infusion, 0-15 mg/hr, Intravenous, Continuous, Kenneth Owens M.D., Stopped at 08/14/23 0105    acetaminophen (Tylenol) suppository 650 mg, 650 mg, Rectal, Q4HRS KATINAN, Kristin Agudelo    Physical Examination:    Vitals:    08/14/23 1000 08/14/23 1030 08/14/23 1100 08/14/23 1200   BP: (!) 148/79 (!) 154/89 (!) 154/88 134/79   Pulse: 84 83 82 82   Resp: (!) 47 20 (!) 28 (!) 39   Temp: 36.3 °C (97.4 °F) 36.3 °C (97.4 °F)  36.6 °C (97.8 °F)   TempSrc: Temporal Temporal  Temporal   SpO2: 94% 96% 96% 95%   Weight:  78.9 kg (173 lb 15.1 oz)     Height:  1.702 m (5' 7\")         General:   Patient is awake and in no " acute distress  Neck: Full range of motion  Eyes: Midline, Pupils reactive to light.  CV: RRR  Lungs: No respiratory distress  Extremities: No cyanosis, warm, no significant edema.    NEUROLOGICAL EXAM:   Mental status: Awake, alert and fully oriented, follows commands  Speech and language: speech is not dysarthric. The patient is able to name and repeat.  Cranial nerve exam: Pupils are equal, round and reactive to light bilaterally. Visual fields are full. Extraocular muscles are intact.   Face is symmetric, facial sensation is intact.   Motor exam: Sustain antigravity in all 4 extremities with no downward drift. Tone is normal. No abnormal movements were seen on exam.  Sensory exam: No sensory deficits identified   Coordination: no gross ataxia noted on exam  Plantar reflexes: Equivocal  Gait: deferred     Objective Data:    Labs:  Lab Results   Component Value Date/Time    PROTHROMBTM 13.7 08/12/2023 06:17 PM    INR 1.06 08/12/2023 06:17 PM      Lab Results   Component Value Date/Time    WBC 5.0 08/14/2023 04:35 AM    RBC 3.71 (L) 08/14/2023 04:35 AM    HEMOGLOBIN 12.0 08/14/2023 04:35 AM    HEMATOCRIT 34.8 (L) 08/14/2023 04:35 AM    MCV 93.8 08/14/2023 04:35 AM    MCH 32.3 08/14/2023 04:35 AM    MCHC 34.5 08/14/2023 04:35 AM    MPV 9.0 08/14/2023 04:35 AM    NEUTSPOLYS 66.10 08/14/2023 04:35 AM    LYMPHOCYTES 19.80 (L) 08/14/2023 04:35 AM    MONOCYTES 12.10 08/14/2023 04:35 AM    EOSINOPHILS 1.60 08/14/2023 04:35 AM    BASOPHILS 0.20 08/14/2023 04:35 AM      Lab Results   Component Value Date/Time    SODIUM 139 08/14/2023 04:35 AM    POTASSIUM 4.2 08/14/2023 04:35 AM    CHLORIDE 107 08/14/2023 04:35 AM    CO2 23 08/14/2023 04:35 AM    GLUCOSE 109 (H) 08/14/2023 04:35 AM    BUN 6 (L) 08/14/2023 04:35 AM    CREATININE 0.70 08/14/2023 04:35 AM      No results found for: CHOLSTRLTOT, LDL, HDL, TRIGLYCERIDE    Lab Results   Component Value Date/Time    ALKPHOSPHAT 54 08/13/2023 03:20 AM    ASTSGOT 30 08/13/2023 03:20  AM    ALTSGPT 24 08/13/2023 03:20 AM    TBILIRUBIN 0.6 08/13/2023 03:20 AM        Imaging/Testing:    I interpreted and/or reviewed the patient's neuroimaging    CT-HEAD W/O   Final Result         1.  Mild brain swelling and edema noted in the right frontal area consistent with subacute infarct.      2.  No significant mass effect or midline shift.      3.  No intracranial hemorrhage identified.               EC-ECHOCARDIOGRAM LTD W/O CONT   Final Result      DX-CHEST-PORTABLE (1 VIEW)   Final Result      No acute cardiopulmonary abnormality.      IR-THROMBO MECHANICAL ARTERY,INIT   Final Result      1.  Severe stenosis of cavernous segment of the right internal carotid artery likely representing acute plaque rupture.   2.  Successful angioplasty of the atherosclerotic stenosis with moderate improvement.   3.  Residual thrombus and occlusion in the right M2 segment   4.  Nonflow limiting residual thrombus in the distal right A1 segment.      CT-CTA NECK WITH & W/O-POST PROCESSING   Final Result      1.  Occlusion or severe occlusion of the RIGHT internal carotid artery.   2.  Occlusion of the LEFT internal carotid artery.      Findings were discussed with Dr Sanders on 8/12/2023 6:25 PM.      CT-CTA HEAD WITH & W/O-POST PROCESS   Final Result   Addendum (preliminary) 1 of 1   Additional   impression: Occlusion of the petrous through proximal supraclinoid    BILATERAL internal carotid arteries. Anterior circulation may be dependent    on flow through posterior and anterior communicating arteries. This was    also discussed with Dr Sanders.      Final      RIGHT M1 occlusion      Findings were discussed with Dr Sanders on 8/12/2023 6:19 PM.         CT-CEREBRAL PERFUSION ANALYSIS   Final Result      1.Cerebral blood flow less than 30% likely representing completed infarct = 0 mL   2.T Max more than 6 seconds likely representing combination of completed infarct and ischemia = 108 mL   3. Mismatched volume  likely representing ischemic brain/penumbra= 108 mL   4.Please note that the cerebral perfusion was performed on the limited brain tissue around the basal ganglia region. Infarct/ischemia outside the CT perfusion sections may not be seen on this study.      Findings were discussed with Dr Sanders on 8/12/2023 6:18 PM.      CT-HEAD W/O   Final Result      1. Cerebral atrophy.   2. White matter lucencies most consistent with small vessel ischemic change versus demyelination or gliosis.   3. Small areas of encephalomalacia in the BILATERAL frontal vertex Otherwise, Head CT without contrast with no acute findings. No evidence of acute cerebral infarction, hemorrhage or mass lesion.         MR-BRAIN-W/O    (Results Pending)       Assessment and Plan:  63 year old woman presenting with R MCA syndrome, found to have R ICA/M1 occlusions.  In cath lab- R ICA thrombus partially lysed and migrated to R M2 and A1 segments.  The R cavernous ICA was critically stenotic, and she is now s/p R ICA angioplasty.  Currently on DAPT with ASA and birlinta (plavix allergy).  Symptoms have resolved and her NIH scale score is 0.  Repeat brain CT on 8/13/2023 revealed subtle hypodensity in right frontal head region consistent with subacute infarct with no evidence of hemorrhage.      Plan:  -Every 4 hour neuro check  - Obtain brain MRI without contrast, pending.  - Continue DAPT with aspirin 81 mg daily and Brilinta 90 mg twice a day for 90 days followed by Brilinta 90 mg twice a day.  - Given right M2 occlusion continue with acute hypertensive response for the next 24 hours, maintain systolic blood pressure 120-160.  - Obtain lipid profile and hemoglobin A1c.  - Continue Lipitor 40 mg daily, goal of LDL less than 70.  - Continue PT, OT and SLP.  - Consider transferring out of ICU in a.m. if remains stable.        The evaluation of the patient, and recommended management, was discussed with Hardy Herrera Jr., D.O.    Please note that  this dictation was created using voice recognition software. I have made every reasonable attempt to correct obvious errors, but I expect that there are errors of grammar and possibly content that I did not discover before finalizing the note.      Prakash Arredondo MD  Acute [facility] Neurology Services

## 2023-08-14 NOTE — ASSESSMENT & PLAN NOTE
Acute ischemic stroke in the setting of prior left carotid occlusion as well as prior atrial fibrillation status post ablation.  She underwent right internal carotid artery angioplasty by interventional radiology with subsequent ICU admission  She required nicardipine drip which has been titrated off and will escalate her blood pressure oral medications  Dual antiplatelet therapy  Continuous telemetry monitoring to evaluate for occult atrial fibrillation  Lipid panel ordered for the morning.  Statin allergy with Lipitor but she is willing to try Crestor which has been ordered.  PT/OT/ST

## 2023-08-14 NOTE — THERAPY
Occupational Therapy   Initial Evaluation     Patient Name: Juan Luis Rolon  Age:  63 y.o., Sex:  female  Medical Record #: [id number]  Today's Date: 8/14/2023     Precautions  Precautions: (P) Fall Risk, Swallow Precautions (per SLP)    Assessment    Patient is 63 y.o. female admitted following R MCA stroke/occlusion now  s/p R ICA angioplasty; PMH afib, HTN. Pt normally independent with functional mobility and ADLs living in a 2 story house alone has supportive neighbors who can assist. Pt able to complete all functional mobility and ADLs with supervision, no AD required. Pts BUE strength/sensation/coordination all WFL, no functional deficits noted. Anticipate pt is at her functional baseline, no acute OT needs will complete order at this time. Patient will not be actively followed for occupational therapy services at this time, however may be seen if requested by physician for 1 more visit within 30 days to address any discharge or equipment needs.     Plan    DC Equipment Recommendations: (P) None  Discharge Recommendations: (P) Anticipate that the patient will have no further occupational therapy needs after discharge from the hospital     Objective       08/14/23 0856   Prior Living Situation   Prior Services None   Housing / Facility 2 Story House   Steps Into Home 3   Steps In Home   (FOS)   Rail None   Bathroom Set up Walk In Shower;Shower Chair   Equipment Owned None   Lives with - Patient's Self Care Capacity Alone and Able to Care For Self   Prior Level of ADL Function   Self Feeding Independent   Grooming / Hygiene Independent   Bathing Independent   Dressing Independent   Toileting Independent   Prior Level of IADL Function   Medication Management Independent   Laundry Independent   Kitchen Mobility Independent   Finances Independent   Home Management Independent   Shopping Independent   Prior Level Of Mobility Independent Without Device in Community   Driving / Transportation Driving Independent    Occupation (Pre-Hospital Vocational) Retired Due To Age   History of Falls   History of Falls Yes   Precautions   Precautions Fall Risk;Swallow Precautions  (per SLP)   Pain 0 - 10 Group   Therapist Pain Assessment Post Activity Pain Same as Prior to Activity;Nurse Notified   Cognition    Cognition / Consciousness WDL   Level of Consciousness Alert   Active ROM Upper Body   Active ROM Upper Body  WDL   Dominant Hand Right   Strength Upper Body   Upper Body Strength  WDL   Sensation Upper Body   Upper Extremity Sensation  WDL   Upper Body Muscle Tone   Upper Body Muscle Tone  WDL   Neurological Concerns   Neurological Concerns No   Coordination Upper Body   Coordination WDL   Balance Assessment   Sitting Balance (Static) Good   Sitting Balance (Dynamic) Good   Standing Balance (Static) Good   Standing Balance (Dynamic) Good   Weight Shift Sitting Good   Weight Shift Standing Good   Comments no AD   Bed Mobility    Supine to Sit Supervised   Scooting Supervised   Rolling Supervised   ADL Assessment   Grooming Supervision   Upper Body Dressing Supervision   Lower Body Dressing Supervision   How much help from another person does the patient currently need...   Putting on and taking off regular lower body clothing? 4   Bathing (including washing, rinsing, and drying)? 4   Toileting, which includes using a toilet, bedpan, or urinal? 4   Putting on and taking off regular upper body clothing? 4   Taking care of personal grooming such as brushing teeth? 4   Eating meals? 4   6 Clicks Daily Activity Score 24   mRS Prior to admission   Prior to admission mRS 0   Modified [place] (mRS)   Modified Jodie Score 0   Functional Mobility   Sit to Stand Supervised   Bed, Chair, Wheelchair Transfer Supervised   Transfer Method Stand Step   Mobility bed mobility, hallway, up to chair   Comments no AD   ICU Target Mobility Level   ICU Mobility - Targeted Level Level 4   Visual Perception   Visual Perception  WDL   Activity Tolerance    Sitting in Chair left seated in chair   Sitting Edge of Bed 5 min   Standing 8 min   Education Group   Education Provided Role of Occupational Therapist   Role of Occupational Therapist Patient Response Patient;Acceptance;Explanation   Problem List   Problem List None   Anticipated Discharge Equipment and Recommendations   DC Equipment Recommendations None   Discharge Recommendations Anticipate that the patient will have no further occupational therapy needs after discharge from the hospital   Interdisciplinary Plan of Care Collaboration   IDT Collaboration with  Nursing;Physical Therapist   Patient Position at End of Therapy Seated;Chair Alarm On;Call Light within Reach;Tray Table within Reach;Phone within Reach   Collaboration Comments RN updated

## 2023-08-14 NOTE — PROGRESS NOTES
12-hour chart check complete.    Monitor Summary  Rhythm: sinus rhythm  Rate: 60s-90s  Ectopy: rare PVCs  Measurements: .14/.08/.39

## 2023-08-14 NOTE — CONSULTS
Hospital Medicine Consultation    Date of Service  8/14/2023    Referring Physician  Hardy Hodges D.O.    Consulting Physician  Tobias Angulo M.D.    Reason for Consultation  stroke    History of Presenting Illness  63 y.o. female who presented 8/12/2023 with left sided weakness.  Ms. 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 Garcia 6/23/2023 that was brought to the emergency room on 8/12/2023 with acute onset of left facial droop with left arm and leg weakness.  A CT scan was negative for bleed while a CTA of the head revealed a right M1 occlusion.  She went emergently to the interventional radiology suite and underwent right internal carotid artery angioplasty with subsequent ICU admission.    Review of Systems  Review of Systems   Constitutional:  Negative for chills and fever.   Eyes:  Negative for blurred vision.   Respiratory:  Negative for cough and shortness of breath.    Neurological:  Negative for sensory change, speech change and focal weakness.   All other systems reviewed and are negative.      Past Medical History   has a past medical history of Arrhythmia (12/23/2022), Hypertension, and Stroke (HCC) (5/11/2011).    Surgical History   has no past surgical history on file.    Family History  family history is not on file.    Social History   reports that she has never smoked. She has never used smokeless tobacco. She reports current alcohol use of about 3.5 oz of alcohol per week. She reports that she does not use drugs.    Medications  Prior to Admission Medications   Prescriptions Last Dose Informant Patient Reported? Taking?   L-Lysine 500 MG Tab  Patient Yes No   Sig: Take 500 mg by mouth every day.   amLODIPine (NORVASC) 2.5 MG Tab  Patient No No   Sig: TAKE 1 TABLET BY MOUTH  DAILY   aspirin (ASA) 81 MG Chew Tab chewable tablet  Patient No No   Sig: Chew 1 Tablet every  day.   atenolol (TENORMIN) 50 MG Tab  Patient No No   Sig: TAKE 1 TABLET BY MOUTH  DAILY   colchicine (COLCRYS) 0.6 MG Tab  Patient Yes No   Sig: Take 1 Tablet (0.6 mg) by mouth 2 times daily.   docosahexanoic acid (OMEGA 3 FA) 1000 MG CAPS  Patient Yes No   Sig: Take 1,000 mg by mouth every day.   losartan (COZAAR) 25 MG Tab  Patient No No   Sig: TAKE 1 TABLET BY MOUTH  DAILY   Patient taking differently: Take 25 mg by mouth every day.   spironolactone/hctz (ALDACTAZIDE) 25-25 MG Tab  Patient No No   Sig: TAKE ONE-HALF TABLET BY  MOUTH DAILY   Patient taking differently: Take 0.5 Tablets by mouth every day.   terazosin (HYTRIN) 1 MG Cap  Patient No No   Sig: TAKE 1 CAPSULE BY MOUTH  EVERY NIGHT AT BEDTIME   Patient taking differently: Take 1 mg by mouth every day.   tretinoin (RETIN-A) 0.025 % cream  Patient Yes No   Sig: Apply 1 Application topically every evening.      Facility-Administered Medications: None       Allergies  Allergies   Allergen Reactions    Atorvastatin     Codeine Vomiting    Morphine Vomiting    Plavix [Clopidogrel Bisulfate] Hives    Warfarin Hives       Physical Exam  Temp:  [36.3 °C (97.4 °F)-36.9 °C (98.4 °F)] 36.9 °C (98.4 °F)  Pulse:  [] 78  Resp:  [14-75] 21  BP: (128-183)/(73-99) 143/86  SpO2:  [87 %-98 %] 97 %    Physical Exam  Vitals and nursing note reviewed.   Constitutional:       General: She is not in acute distress.     Appearance: She is not toxic-appearing.   Cardiovascular:      Rate and Rhythm: Normal rate and regular rhythm.      Heart sounds: No murmur heard.  Pulmonary:      Effort: Pulmonary effort is normal.   Abdominal:      General: There is no distension.      Tenderness: There is no abdominal tenderness.   Musculoskeletal:      Right lower leg: No edema.      Left lower leg: No edema.   Neurological:      General: No focal deficit present.      Mental Status: She is alert and oriented to person, place, and time.      Comments: Slight left facial droop    Psychiatric:         Mood and Affect: Mood normal.         Behavior: Behavior normal.         Fluids  Date 08/14/23 0700 - 08/15/23 0659   Shift 4051-9332 8637-5867 3238-0802 24 Hour Total   INTAKE   P.O. 800   800   Shift Total 800   800   OUTPUT   Shift Total       Weight (kg) 78.9 78.9 78.9 78.9       Laboratory  Recent Labs     08/12/23 1817 08/13/23  0320 08/14/23  0435   WBC 9.5 5.4 5.0   RBC 3.86* 3.56* 3.71*   HEMOGLOBIN 12.4 11.5* 12.0   HEMATOCRIT 35.7* 33.6* 34.8*   MCV 92.5 94.4 93.8   MCH 32.1 32.3 32.3   MCHC 34.7 34.2 34.5   RDW 40.2 41.6 41.6   PLATELETCT 293 264 253   MPV 9.0 9.1 9.0     Recent Labs     08/12/23 1817 08/13/23 0320 08/14/23  0435   SODIUM 132* 139 139   POTASSIUM 4.0 3.4* 4.2   CHLORIDE 98 103 107   CO2 24 24 23   GLUCOSE 117* 114* 109*   BUN 12 9 6*   CREATININE 0.68 0.66 0.70   CALCIUM 9.0 9.3 9.1     Recent Labs     08/12/23 1817   APTT 26.5   INR 1.06                 Imaging  CT-HEAD W/O   Final Result         1.  Mild brain swelling and edema noted in the right frontal area consistent with subacute infarct.      2.  No significant mass effect or midline shift.      3.  No intracranial hemorrhage identified.               EC-ECHOCARDIOGRAM LTD W/O CONT   Final Result      DX-CHEST-PORTABLE (1 VIEW)   Final Result      No acute cardiopulmonary abnormality.      IR-THROMBO MECHANICAL ARTERY,INIT   Final Result      1.  Severe stenosis of cavernous segment of the right internal carotid artery likely representing acute plaque rupture.   2.  Successful angioplasty of the atherosclerotic stenosis with moderate improvement.   3.  Residual thrombus and occlusion in the right M2 segment   4.  Nonflow limiting residual thrombus in the distal right A1 segment.      CT-CTA NECK WITH & W/O-POST PROCESSING   Final Result      1.  Occlusion or severe occlusion of the RIGHT internal carotid artery.   2.  Occlusion of the LEFT internal carotid artery.      Findings were discussed with   Marilyn on 8/12/2023 6:25 PM.      CT-CTA HEAD WITH & W/O-POST PROCESS   Final Result   Addendum (preliminary) 1 of 1   Additional   impression: Occlusion of the petrous through proximal supraclinoid    BILATERAL internal carotid arteries. Anterior circulation may be dependent    on flow through posterior and anterior communicating arteries. This was    also discussed with Dr Sanders.      Final      RIGHT M1 occlusion      Findings were discussed with Dr Sanders on 8/12/2023 6:19 PM.         CT-CEREBRAL PERFUSION ANALYSIS   Final Result      1.Cerebral blood flow less than 30% likely representing completed infarct = 0 mL   2.T Max more than 6 seconds likely representing combination of completed infarct and ischemia = 108 mL   3. Mismatched volume likely representing ischemic brain/penumbra= 108 mL   4.Please note that the cerebral perfusion was performed on the limited brain tissue around the basal ganglia region. Infarct/ischemia outside the CT perfusion sections may not be seen on this study.      Findings were discussed with Dr Sanders on 8/12/2023 6:18 PM.      CT-HEAD W/O   Final Result      1. Cerebral atrophy.   2. White matter lucencies most consistent with small vessel ischemic change versus demyelination or gliosis.   3. Small areas of encephalomalacia in the BILATERAL frontal vertex Otherwise, Head CT without contrast with no acute findings. No evidence of acute cerebral infarction, hemorrhage or mass lesion.         MR-BRAIN-W/O    (Results Pending)       Assessment/Plan  * Acute ischemic stroke (HCC)- (present on admission)  Assessment & Plan  Acute ischemic stroke in the setting of prior left carotid occlusion as well as prior atrial fibrillation status post ablation.  She underwent right internal carotid artery angioplasty by interventional radiology with subsequent ICU admission  She required nicardipine drip which has been titrated off and will escalate her blood pressure oral  medications  Dual antiplatelet therapy  Continuous telemetry monitoring to evaluate for occult atrial fibrillation  Lipid panel ordered for the morning.  Statin allergy with Lipitor but she is willing to try Crestor which has been ordered.  PT/OT/ST        Primary hypertension- (present on admission)  Assessment & Plan  History of hypertension for which she is on Norvasc, atenolol, Cozaar, Aldactone, hydrochlorothiazide, Hytrin as an outpatient.  She required an IV nicardipine drip.  Home meds will be restarted accordingly and titrated up    Atrial fibrillation (HCC)- (present on admission)  Assessment & Plan  History of paroxysmal atrial fibrillation status post ablation in June 2023  She has not been on anticoagulation  Continuous telemetry monitoring to evaluate for recurrence    Hypokalemia- (present on admission)  Assessment & Plan  Replacement was given

## 2023-08-14 NOTE — DISCHARGE PLANNING
Case Management Discharge Planning    Admission Date: 8/12/2023  GMLOS: 1.9  ALOS: 2    6-Clicks ADL Score: 24  6-Clicks Mobility Score: 21  PT/OT/SLP: Anticipate no needs at DC    Anticipated Discharge Dispo: Discharge Disposition: Discharged to home/self care (01)  Per chart review pt resides in [place], [place].    Family support:  Tatiana Barr Daughter [phone]     Current Insurance on file: Medicare A&B/ [facility]    DME Needed: pending hospital course    Action(s) Taken: chart reviewed    Escalations Completed: None    Medically Clear: No    Next Steps: Pt discussed during ICU rounds  f/u with pt and medical team to discuss dc needs and barriers.  Assessment to be completed to assess for HCM needs.    Barriers to Discharge: Medical clearance /Specialty Clearance    Is the patient up for discharge tomorrow: No      1555: RNCM spoke to pt bedside to obtain assessment information. Pt verified demographics. Pt states she is independent at baseline and doesn't use any DME. Pt states her daughter will provide transportation to home at DC.    Care Transition Team Assessment    Information Source  Orientation Level: Oriented X4  Information Given By: Patient  Who is responsible for making decisions for patient? : Patient    Readmission Evaluation  Is this a readmission?: No    Elopement Risk  Legal Hold: No  Ambulatory or Self Mobile in Wheelchair: Yes  Disoriented: No  Psychiatric Symptoms: None  History of Wandering: No  Elopement this Admit: No  Vocalizing Wanting to Leave: No  Displays Behaviors, Body Language Wanting to Leave: No-Not at Risk for Elopement  Elopement Risk: Not at Risk for Elopement    Interdisciplinary Discharge Planning  Does Admitting Nurse Feel This Could be a Complex Discharge?: No  Primary Care Physician: MD Salazar  Lives with - Patient's Self Care Capacity: (P) Alone and Able to Care For Self  Patient or legal guardian wants to designate a caregiver: No  Support  Systems: Children, Friends / Neighbors, Family Member(s)  Housing / Facility: 2 Story House (3 steps to enter)  Do You Take your Prescribed Medications Regularly: Yes  Able to Return to Previous ADL's: Yes  Mobility Issues: No  Prior Services: None  Patient Prefers to be Discharged to:: Home  Durable Medical Equipment: (P) Not Applicable    Discharge Preparedness  What is your plan after discharge?: Home with help  What are your discharge supports?: Child  Prior Functional Level: Independent with Activities of Daily Living, Independent with Medication Management, Drives Self    Functional Assesment  Prior Functional Level: Independent with Activities of Daily Living, Independent with Medication Management, Drives Self    Finances  Financial Barriers to Discharge: No  Prescription Coverage: Yes    Vision / Hearing Impairment  Vision Impairment : Yes  Right Eye Vision: Impaired, Wears Glasses  Left Eye Vision: Impaired, Wears Glasses    Advance Directive  Advance Directive?: DPOA for Health Care    Domestic Abuse  Have you ever been the victim of abuse or violence?: No  Physical Abuse or Sexual Abuse: No  Verbal Abuse or Emotional Abuse: No  Possible Abuse/Neglect Reported to:: Not Applicable    Psychological Assessment  History of Substance Abuse: None  History of Psychiatric Problems: No    Discharge Risks or Barriers  Discharge risks or barriers?: Complex medical needs  Patient risk factors: Complex medical needs, Vulnerable adult    Anticipated Discharge Information  Discharge Disposition: Discharged to home/self care (01)

## 2023-08-15 ENCOUNTER — APPOINTMENT (OUTPATIENT)
Dept: RADIOLOGY | Facility: MEDICAL CENTER | Age: 63
DRG: 038 | End: 2023-08-15
Attending: PSYCHIATRY & NEUROLOGY
Payer: MEDICARE

## 2023-08-15 ENCOUNTER — APPOINTMENT (OUTPATIENT)
Dept: RADIOLOGY | Facility: MEDICAL CENTER | Age: 63
DRG: 038 | End: 2023-08-15
Payer: MEDICARE

## 2023-08-15 ENCOUNTER — PHARMACY VISIT (OUTPATIENT)
Dept: PHARMACY | Facility: MEDICAL CENTER | Age: 63
End: 2023-08-15
Payer: COMMERCIAL

## 2023-08-15 VITALS
DIASTOLIC BLOOD PRESSURE: 89 MMHG | RESPIRATION RATE: 18 BRPM | OXYGEN SATURATION: 96 % | HEIGHT: 67 IN | HEART RATE: 78 BPM | WEIGHT: 173.94 LBS | SYSTOLIC BLOOD PRESSURE: 142 MMHG | BODY MASS INDEX: 27.3 KG/M2 | TEMPERATURE: 97.7 F

## 2023-08-15 LAB
APPEARANCE UR: ABNORMAL
BACTERIA #/AREA URNS HPF: ABNORMAL /HPF
BILIRUB UR QL STRIP.AUTO: NEGATIVE
COLOR UR: ABNORMAL
EPI CELLS #/AREA URNS HPF: NEGATIVE /HPF
EST. AVERAGE GLUCOSE BLD GHB EST-MCNC: 120 MG/DL
GLUCOSE UR STRIP.AUTO-MCNC: NEGATIVE MG/DL
HBA1C MFR BLD: 5.8 % (ref 4–5.6)
HYALINE CASTS #/AREA URNS LPF: ABNORMAL /LPF
KETONES UR STRIP.AUTO-MCNC: NEGATIVE MG/DL
LEUKOCYTE ESTERASE UR QL STRIP.AUTO: ABNORMAL
MICRO URNS: ABNORMAL
NITRITE UR QL STRIP.AUTO: POSITIVE
PH UR STRIP.AUTO: 7.5 [PH] (ref 5–8)
PROT UR QL STRIP: 30 MG/DL
RBC # URNS HPF: >150 /HPF
RBC UR QL AUTO: ABNORMAL
SP GR UR STRIP.AUTO: 1.01
UROBILINOGEN UR STRIP.AUTO-MCNC: 1 MG/DL
WBC #/AREA URNS HPF: ABNORMAL /HPF

## 2023-08-15 PROCEDURE — 700102 HCHG RX REV CODE 250 W/ 637 OVERRIDE(OP): Performed by: INTERNAL MEDICINE

## 2023-08-15 PROCEDURE — A9270 NON-COVERED ITEM OR SERVICE: HCPCS | Performed by: INTERNAL MEDICINE

## 2023-08-15 PROCEDURE — 93926 LOWER EXTREMITY STUDY: CPT | Mod: RT

## 2023-08-15 PROCEDURE — 70551 MRI BRAIN STEM W/O DYE: CPT

## 2023-08-15 PROCEDURE — 81001 URINALYSIS AUTO W/SCOPE: CPT

## 2023-08-15 PROCEDURE — A9270 NON-COVERED ITEM OR SERVICE: HCPCS | Performed by: HOSPITALIST

## 2023-08-15 PROCEDURE — 700102 HCHG RX REV CODE 250 W/ 637 OVERRIDE(OP): Performed by: HOSPITALIST

## 2023-08-15 PROCEDURE — 87086 URINE CULTURE/COLONY COUNT: CPT

## 2023-08-15 PROCEDURE — 700111 HCHG RX REV CODE 636 W/ 250 OVERRIDE (IP): Performed by: NURSE PRACTITIONER

## 2023-08-15 PROCEDURE — 700111 HCHG RX REV CODE 636 W/ 250 OVERRIDE (IP): Performed by: INTERNAL MEDICINE

## 2023-08-15 PROCEDURE — RXMED WILLOW AMBULATORY MEDICATION CHARGE: Performed by: HOSPITALIST

## 2023-08-15 PROCEDURE — 87186 SC STD MICRODIL/AGAR DIL: CPT

## 2023-08-15 PROCEDURE — 99239 HOSP IP/OBS DSCHRG MGMT >30: CPT | Performed by: HOSPITALIST

## 2023-08-15 PROCEDURE — 93926 LOWER EXTREMITY STUDY: CPT | Mod: 26,RT | Performed by: INTERNAL MEDICINE

## 2023-08-15 PROCEDURE — 87077 CULTURE AEROBIC IDENTIFY: CPT

## 2023-08-15 RX ORDER — AMOXICILLIN AND CLAVULANATE POTASSIUM 875; 125 MG/1; MG/1
1 TABLET, FILM COATED ORAL EVERY 12 HOURS
Status: DISCONTINUED | OUTPATIENT
Start: 2023-08-15 | End: 2023-08-15 | Stop reason: HOSPADM

## 2023-08-15 RX ORDER — AMOXICILLIN AND CLAVULANATE POTASSIUM 875; 125 MG/1; MG/1
1 TABLET, FILM COATED ORAL EVERY 12 HOURS
Qty: 6 TABLET | Refills: 0 | Status: ACTIVE | OUTPATIENT
Start: 2023-08-15 | End: 2023-08-18

## 2023-08-15 RX ORDER — ROSUVASTATIN CALCIUM 20 MG/1
20 TABLET, COATED ORAL EVERY EVENING
Qty: 30 TABLET | Refills: 11 | Status: SHIPPED | OUTPATIENT
Start: 2023-08-15

## 2023-08-15 RX ADMIN — HYDRALAZINE HYDROCHLORIDE 10 MG: 20 INJECTION, SOLUTION INTRAMUSCULAR; INTRAVENOUS at 00:41

## 2023-08-15 RX ADMIN — HYDROMORPHONE HYDROCHLORIDE 0.5 MG: 1 INJECTION, SOLUTION INTRAMUSCULAR; INTRAVENOUS; SUBCUTANEOUS at 01:16

## 2023-08-15 RX ADMIN — AMLODIPINE BESYLATE 2.5 MG: 5 TABLET ORAL at 06:24

## 2023-08-15 RX ADMIN — LOSARTAN POTASSIUM 25 MG: 50 TABLET, FILM COATED ORAL at 06:24

## 2023-08-15 RX ADMIN — AMOXICILLIN AND CLAVULANATE POTASSIUM 1 TABLET: 875; 125 TABLET, FILM COATED ORAL at 11:39

## 2023-08-15 RX ADMIN — TICAGRELOR 90 MG: 90 TABLET ORAL at 06:24

## 2023-08-15 RX ADMIN — ASPIRIN 81 MG 81 MG: 81 TABLET ORAL at 06:24

## 2023-08-15 ASSESSMENT — PAIN DESCRIPTION - PAIN TYPE
TYPE: ACUTE PAIN

## 2023-08-15 ASSESSMENT — ENCOUNTER SYMPTOMS
NAUSEA: 0
VOMITING: 0
HEADACHES: 0
SHORTNESS OF BREATH: 0
SENSORY CHANGE: 0
WEAKNESS: 0
FEVER: 0
PALPITATIONS: 0
CHILLS: 0
TINGLING: 0
SPEECH CHANGE: 0

## 2023-08-15 NOTE — PROGRESS NOTES
Radiology Progress Note   Author: EVANGELINA Dinh Date & Time created: 8/15/2023  11:28 AM   Date of admission  8/12/2023  Note to reader: this note follows the APSO format rather than the historical SOAP format. Assessment and plan located at the top of the note for ease of use.    Chief Complaint  63 y.o. female admitted 8/12/2023 with   Chief Complaint   Patient presents with    Possible Stroke     Pt rosamaria Pineda from home, pt went to bathroom and could not get her pants down, pt ended up on the floor, pt called neighbor to call 911, symptoms started at 1530, pt arrived with facial drop in left side, weakness left arm and leg, pt has hx of TIA, ablation for afib on 6/23/23, bp 193/111, HR 84, pt was having headache behind right eye this morning with spots         HPI  64 yo female with pmh of primary hypertension, TIA, dyslipidemia, and atrial fibrillation s/p left atrial appendage closure with a Watchman device admitted with Right MCA acute ischemic stroke. Pt underwent a Right ICA angioplasty with Dr Sanders on 08/12/23. Residual thrombus and occlusion in the right M2 segment as well as nonflow limiting residual thrombus in the distal right A1 segment. She was started on Integrilin and bridged over to Brilinta post procedure. Pt allergic to Plavix.     Interval History:   08/14/23 - Post procedure CT on 08/13 shows no hemorraghic conversion and no mass effect/midline shift. Pt feeling good overall. Neuro exam intact.     08/15/23 - No acute events overnight. Neuro exam remains intact.     Assessment/Plan     Principal Problem:    Acute ischemic stroke (HCC)  Active Problems:    Primary hypertension    Dyslipidemia    Atrial fibrillation (HCC)    Hypokalemia    Hypomagnesemia      Plan IR  - Post-angioseal instructions: no lifting greater than 5 lbs and no baths/ swimming/ soaking in tub for 5 days. Shower OK. OK to change dressings/band aid as needed.  - Continue Brilinta 90 mg BID and aspirin  81 mg at least 90 days post procedure.    - Follow up appointment in 1 month with OP Neuro IR, our office to call and schedule  - Neuro Checks per Neurology   - SBP goal per neurology.  - From a Neuro Interventional Service standpoint, OK to discharge to home when medically cleared by Hospitalist Service.    -IR will sign off.         Review of Systems  Physical Exam   Review of Systems   Constitutional:  Negative for chills and fever.   Respiratory:  Negative for shortness of breath.    Cardiovascular:  Negative for chest pain and palpitations.   Gastrointestinal:  Negative for nausea and vomiting.   Neurological:  Negative for tingling, sensory change, speech change, weakness and headaches.      Vitals:    08/15/23 0800   BP: (!) 142/89   Pulse: 78   Resp: 18   Temp: 36.5 °C (97.7 °F)   SpO2: 96%        Physical Exam  Cardiovascular:      Rate and Rhythm: Normal rate.   Pulmonary:      Effort: Pulmonary effort is normal.   Abdominal:      Palpations: Abdomen is soft.   Skin:     General: Skin is warm and dry.      Comments: Right groin access site soft, non-tender, dressing cdi.    Neurological:      General: No focal deficit present.      Mental Status: She is alert and oriented to person, place, and time.      Cranial Nerves: No cranial nerve deficit.      Sensory: No sensory deficit.      Motor: No weakness.   Psychiatric:         Mood and Affect: Mood normal.             Labs    Recent Labs     08/12/23  1817 08/13/23  0320 08/14/23  0435   WBC 9.5 5.4 5.0   RBC 3.86* 3.56* 3.71*   HEMOGLOBIN 12.4 11.5* 12.0   HEMATOCRIT 35.7* 33.6* 34.8*   MCV 92.5 94.4 93.8   MCH 32.1 32.3 32.3   MCHC 34.7 34.2 34.5   RDW 40.2 41.6 41.6   PLATELETCT 293 264 253   MPV 9.0 9.1 9.0       Recent Labs     08/12/23  1817 08/13/23  0320 08/14/23  0435   SODIUM 132* 139 139   POTASSIUM 4.0 3.4* 4.2   CHLORIDE 98 103 107   CO2 24 24 23   GLUCOSE 117* 114* 109*   BUN 12 9 6*   CREATININE 0.68 0.66 0.70   CALCIUM 9.0 9.3 9.1        Recent Labs     08/12/23  1817 08/13/23  0320 08/14/23  0435   ALBUMIN 4.0 4.0  --    TBILIRUBIN 0.4 0.6  --    ALKPHOSPHAT 59 54  --    TOTPROTEIN 6.3 6.2  --    ALTSGPT 25 24  --    ASTSGOT 35 30  --    CREATININE 0.68 0.66 0.70       US-EXTREMITY ARTERY LOWER UNILAT RIGHT   Final Result      CT-HEAD W/O   Final Result         1.  Mild brain swelling and edema noted in the right frontal area consistent with subacute infarct.      2.  No significant mass effect or midline shift.      3.  No intracranial hemorrhage identified.               EC-ECHOCARDIOGRAM LTD W/O CONT   Final Result      DX-CHEST-PORTABLE (1 VIEW)   Final Result      No acute cardiopulmonary abnormality.      IR-THROMBO MECHANICAL ARTERY,INIT   Final Result      1.  Severe stenosis of cavernous segment of the right internal carotid artery likely representing acute plaque rupture.   2.  Successful angioplasty of the atherosclerotic stenosis with moderate improvement.   3.  Residual thrombus and occlusion in the right M2 segment   4.  Nonflow limiting residual thrombus in the distal right A1 segment.      CT-CTA NECK WITH & W/O-POST PROCESSING   Final Result      1.  Occlusion or severe occlusion of the RIGHT internal carotid artery.   2.  Occlusion of the LEFT internal carotid artery.      Findings were discussed with Dr Sanders on 8/12/2023 6:25 PM.      CT-CTA HEAD WITH & W/O-POST PROCESS   Final Result   Addendum (preliminary) 1 of 1   Additional   impression: Occlusion of the petrous through proximal supraclinoid    BILATERAL internal carotid arteries. Anterior circulation may be dependent    on flow through posterior and anterior communicating arteries. This was    also discussed with Dr Sanders.      Final      RIGHT M1 occlusion      Findings were discussed with Dr Sanders on 8/12/2023 6:19 PM.         CT-CEREBRAL PERFUSION ANALYSIS   Final Result      1.Cerebral blood flow less than 30% likely representing completed infarct  = 0 mL   2.T Max more than 6 seconds likely representing combination of completed infarct and ischemia = 108 mL   3. Mismatched volume likely representing ischemic brain/penumbra= 108 mL   4.Please note that the cerebral perfusion was performed on the limited brain tissue around the basal ganglia region. Infarct/ischemia outside the CT perfusion sections may not be seen on this study.      Findings were discussed with Dr Sanders on 8/12/2023 6:18 PM.      CT-HEAD W/O   Final Result      1. Cerebral atrophy.   2. White matter lucencies most consistent with small vessel ischemic change versus demyelination or gliosis.   3. Small areas of encephalomalacia in the BILATERAL frontal vertex Otherwise, Head CT without contrast with no acute findings. No evidence of acute cerebral infarction, hemorrhage or mass lesion.         MR-BRAIN-W/O    (Results Pending)         INR   Date Value Ref Range Status   08/12/2023 1.06 0.87 - 1.13 Final     Comment:     INR - Non-therapeutic Reference Range: 0.87-1.13  INR - Therapeutic Reference Range: 2.0-4.0       No results found for: POCINR     Intake/Output Summary (Last 24 hours) at 8/14/2023 0852  Last data filed at 8/14/2023 0600  Gross per 24 hour   Intake 2212.23 ml   Output 2100 ml   Net 112.23 ml      Labs not explicitly included in this progress note were reviewed by the author. Radiology/imaging not explicitly included in this progress note was reviewed by the author.     I have performed a physical exam and reviewed and updated ROS and Plan today (8/15/2023).     30 minutes in directly providing and coordinating care and extensive data review.  No time overlap and excludes procedures.

## 2023-08-15 NOTE — DISCHARGE PLANNING
Juan Luis is not requiring 2 of 3 disciplines.  TCC will no longer follow.  Please reach out to myself with any interval changes/questions.

## 2023-08-15 NOTE — CARE PLAN
The patient is Watcher - Medium risk of patient condition declining or worsening    Shift Goals  Clinical Goals: -160  Patient Goals: DC home  Family Goals: MARCELLO    Progress made toward(s) clinical / shift goals:    Problem: Optimal Care of the Stroke Patient  Goal: Optimal acute care for the stroke patient  Outcome: Progressing     Problem: Knowledge Deficit - Stroke Education  Goal: Patient's knowledge of stroke and risk factors will improve  Outcome: Progressing     Problem: Neuro Status  Goal: Neuro status will remain stable or improve  Outcome: Progressing     Problem: Pain - Standard  Goal: Alleviation of pain or a reduction in pain to the patient’s comfort goal  Outcome: Progressing       Patient is not progressing towards the following goals:

## 2023-08-15 NOTE — PROGRESS NOTES
0125 - pt c/o 10/10 pain in her lower abdomen/bladder feeling like pressure and she still has to void. Pt ambulated to the bathroom twice to void, no issues emptying bladder, bladder scan showed 64ml. Administered 0.5 dilaudid PRN - see MAR. Notified APRN, received orders for US.    0427 - Pt ambulated to bathroom and reported bloody urine. Updated APRN, received orders for UA.

## 2023-08-15 NOTE — DISCHARGE SUMMARY
Discharge Summary    CHIEF COMPLAINT ON ADMISSION  Chief Complaint   Patient presents with    Possible Stroke     Pt rosamaria Pineda from home, pt went to bathroom and could not get her pants down, pt ended up on the floor, pt called neighbor to call 911, symptoms started at 1530, pt arrived with facial drop in left side, weakness left arm and leg, pt has hx of TIA, ablation for afib on 6/23/23, bp 193/111, HR 84, pt was having headache behind right eye this morning with spots       Reason for Admission  Acute CVA    Admission Date  8/12/2023    CODE STATUS  Full Code    HPI & HOSPITAL COURSE  63 y.o. female who presented 8/12/2023 with left sided weakness.  Ms. 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 Garcia 6/23/2023 that was brought to the emergency room on 8/12/2023 with acute onset of left facial droop with left arm and leg weakness.  A CT scan was negative for bleed while a CTA of the head revealed a right M1 occlusion.  She went emergently to the interventional radiology suite and underwent right internal carotid artery angioplasty with subsequent ICU admission for close monitoring, there was residual thrombus and occlusion in the right M2 segment as well as nonflow limiting residual thrombus in the distal right A1 segment, the patient was started initially on Integrilin and bridged over to Brilinta and aspirin.  The patient has an allergy to Plavix, follow-up CT shows no hemorrhagic conversion, the patient had a very good overall post interventional course, resolution of symptoms, she developed some urinary difficulty and was diagnosed with an acute UTI that was treated with antibiotics, a follow-up MRI revealed multiple areas of acute infarction, neurology consulted and currently in agreement with ongoing treatment, close outpatient follow-up with the [facility], neurology,  interventional neuroradiology postprocedure.  The patient had a right groin ultrasound to evaluate for possible evaluation of developing pseudoaneurysm which was negative.  Given these developments and good outcome the patient is therefore discharged in a medically much improved condition.    Therefore, she is discharged in fair and stable condition to home with close outpatient follow-up.    The patient met 2-midnight criteria for an inpatient stay at the time of discharge.    Discharge Date  8/15/2023    FOLLOW UP ITEMS POST DISCHARGE  Follow-up closely with neurology clinic,  Neuro interventional radiology clinic  Cardiology as previously scheduled        DISCHARGE DIAGNOSES  Principal Problem:    Acute ischemic stroke (HCC) (POA: Yes)  Active Problems:    Primary hypertension (POA: Yes)      Overview: ICD-10 transition    Dyslipidemia (POA: Yes)    Atrial fibrillation (HCC) (POA: Yes)    Hypokalemia (POA: Yes)    Hypomagnesemia (POA: Yes)  Resolved Problems:    Transient ischemic attack (POA: Yes)      FOLLOW UP  Future Appointments   Date Time Provider Department Center   9/4/2023 10:45 AM [facility] EXAM 6 [facility]   9/27/2023  1:20 PM VASCULAR NURSE PRACTITIONER [facility] None         MEDICATIONS ON DISCHARGE     Medication List        START taking these medications        Instructions   amoxicillin-clavulanate 875-125 MG Tabs  Commonly known as: Augmentin   Take 1 Tablet by mouth every 12 hours for 3 days.  Dose: 1 Tablet     Brilinta 90 MG Tabs tablet  Generic drug: ticagrelor   Take 1 Tablet by mouth 2 times a day.  Dose: 90 mg     rosuvastatin 20 MG Tabs  Commonly known as: Crestor   Take 1 Tablet by mouth every evening.  Dose: 20 mg            CHANGE how you take these medications        Instructions   terazosin 1 MG Caps  What changed: when to take this  Commonly known as: Hytrin   Doctor's comments: Requesting 1 year supply  TAKE 1 CAPSULE BY MOUTH  EVERY NIGHT AT BEDTIME            CONTINUE taking  these medications        Instructions   amLODIPine 2.5 MG Tabs  Commonly known as: Norvasc   Doctor's comments: Requesting 1 year supply  TAKE 1 TABLET BY MOUTH  DAILY     aspirin 81 MG Chew chewable tablet  Commonly known as: Asa   Chew 1 Tablet every day.  Dose: 81 mg     atenolol 50 MG Tabs  Commonly known as: Tenormin   Doctor's comments: Requesting 1 year supply  TAKE 1 TABLET BY MOUTH  DAILY     colchicine 0.6 MG Tabs  Commonly known as: Colcrys   Take 1 Tablet (0.6 mg) by mouth 2 times daily.     docosahexanoic acid 1000 MG Caps  Commonly known as: Omega 3 Fa   Take 1,000 mg by mouth every day.  Dose: 1,000 mg     L-Lysine 500 MG Tabs   Take 500 mg by mouth every day.  Dose: 500 mg     losartan 25 MG Tabs  Commonly known as: Cozaar   Doctor's comments: Requesting 1 year supply  TAKE 1 TABLET BY MOUTH  DAILY     spironolactone/hctz 25-25 MG Tabs  Commonly known as: Aldactazide   Doctor's comments: Requesting 1 year supply  TAKE ONE-HALF TABLET BY  MOUTH DAILY     tretinoin 0.025 % cream  Commonly known as: Retin-A   Apply 1 Application topically every evening.  Dose: 1 Application              Allergies  Allergies   Allergen Reactions    Atorvastatin     Codeine Vomiting    Morphine Vomiting    Plavix [Clopidogrel Bisulfate] Hives    Warfarin Hives       DIET  Orders Placed This Encounter   Procedures    Diet Order Diet: Regular     Standing Status:   Standing     Number of Occurrences:   1     Order Specific Question:   Diet:     Answer:   Regular [1]       ACTIVITY  As tolerated.  Weight bearing as tolerated    CONSULTATIONS  Intensive care  Neurology  Interventional neuro radiology      PROCEDURES  Neuroradiology intervention/ICA angioplasty on 8/12/2023    LABORATORY  Lab Results   Component Value Date    SODIUM 139 08/14/2023    POTASSIUM 4.2 08/14/2023    CHLORIDE 107 08/14/2023    CO2 23 08/14/2023    GLUCOSE 109 (H) 08/14/2023    BUN 6 (L) 08/14/2023    CREATININE 0.70 08/14/2023        Lab Results    Component Value Date    WBC 5.0 08/14/2023    HEMOGLOBIN 12.0 08/14/2023    HEMATOCRIT 34.8 (L) 08/14/2023    PLATELETCT 253 08/14/2023      The patient was provided with new unchanged medication regimen including Brilinta, Crestor, antibiotics for UTI  A urine culture was sent, currently pending, patient instructed to have outpatient follow-up  Total time of the discharge process the extent of 56 minutes.

## 2023-08-15 NOTE — DISCHARGE INSTRUCTIONS
Ischemic Stroke  Discharge Instructions    You experienced an Ischemic Stroke.  Ischemic stroke is the most common type of stroke and happens when an artery in the brain becomes blocked by a plaque fragment or blood clot. Typically, these blockages travel from the heart or larger arteries that supply the brain.  The brain needs a constant supply of blood, which carries oxygen and nutrients it needs to function.  A stroke occurs when one of these arteries to the brain is either blocked or bursts. As a result, part of the brain does not get the blood it needs, so it starts to die.     Treatment Received:  Mechanical Thrombectomy    You had a Mechanical Thrombectomy.  A Mechanical Thrombectomy is a procedure to remove blood clots from the brain after an ischemic stroke.  The procedure involves making a small incision in the groin, and threading thin tubes (catheters) through your blood vessels to the clot. A tiny device at the catheter's tip grabs the clot and removes it, restoring blood flow to the brain.    Home Care Instructions:    Catheter insertion site care  If you have a bandage, make sure you:  Wash your hands with soap and water for at least 20 seconds before and after you change your bandage. If you cannot use soap and water, use hand .  Change your bandage as told by your doctor.  Check the site every day for signs of infection. Check for:  More redness, swelling, or pain.  Fluid or blood.  Warmth.  Pus or a bad smell.  Activity  Do not lift anything that is heavier than 5 lb (2.3 kg)  Rest  For the first 2-3 days after your procedure, or as long as told:  Try not to climb stairs.  Do not squat.  Return to your normal activities when your provider says that it is safe.  Get up to take short walks every 1 to 2 hours. Ask for help if you feel weak or unsteady.  Do exercises as told by your doctor.    General instructions  Take over-the-counter and prescription medicines as directed by your  "provider.  Do not smoke or use any products that contain nicotine or tobacco.   Keep all follow-up visits.    Contact your provider if:  You have more redness, swelling, or pain around the site where the catheter was put in.  You have a fever.    Stroke Risk Factors    You are at increased risk of having another stroke event.  See your Patient Stroke Guide to help reduce your stroke risk. These are your specific risk factors:  Age - Over 55  High blood pressure  High Cholesterol and lipids     Get help right away if you have any signs of a stroke.  \"BE FAST\" is an easy way to remember the main warning signs of a stroke:  B - Balance. Dizziness, sudden trouble walking, or loss of balance.  E - Eyes. Trouble seeing or a change in how you see.  F - Face. Sudden weakness or loss of feeling in the face. The face or eyelid may droop on one side.  A - Arms. Weakness or loss of feeling in an arm. This happens all of a sudden and most often on one side of the body.  S - Speech. Sudden trouble speaking, slurred speech, or trouble understanding what people say.  T - Time. Time to call emergency services. Write down what time symptoms started.    "

## 2023-08-15 NOTE — THERAPY
Speech Language Therapy Contact Note    Patient Name: Juan Luis Rolon  Age:  63 y.o., Sex:  female  Medical Record #: [id number]  Today's Date: 8/15/2023       08/15/23 1336   Treatment Variance   Reason For Missed Therapy Non-Medical - Other (Please Comment)   Initial Contact Note    Initial Contact Note  Order Received and Verified. Speech Therapy Evaluation NOT Completed Because Patient Does Not Require Acute Speech Therapy at this Time.   Interdisciplinary Plan of Care Collaboration   IDT Collaboration with  Physician   Collaboration Comments Per MD, cognitive-linguistic no longer indicated. Okay to cancel per MD. Please re-consult with any difficulty.

## 2023-08-15 NOTE — PROGRESS NOTES
Patient educated on discharge instructions and follow-up, all questions answered. Belongings gathered. Patient able to walk out accompanied by family.

## 2023-08-15 NOTE — DISCHARGE PLANNING
Meds-to-Beds: Discharge prescription orders listed below delivered to patient's bedside. LINDSAY Klein notified. Patient counseled. Patient elected to have co-payment billed to patient account.     Patient inquired about antibiotic order, verified this was received by pharmacy after delivery. LINDSAY Klein stated patient family will  from [facility] pharmacy.      Current Outpatient Medications   Medication Sig Dispense Refill    rosuvastatin (CRESTOR) 20 MG Tab Take 1 Tablet by mouth every evening. 30 Tablet 11    ticagrelor (BRILINTA) 90 MG Tab tablet Take 1 Tablet by mouth 2 times a day. 60 Tablet 6      Jewell Zamora, PharmD

## 2023-08-15 NOTE — CARE PLAN
The patient is Stable - Low risk of patient condition declining or worsening    Shift Goals  Clinical Goals: sbp 120-160  Patient Goals: go home  Family Goals: MARCELLO    Progress made toward(s) clinical / shift goals:      Problem: Optimal Care of the Stroke Patient  Goal: Optimal emergency care for the stroke patient  Outcome: Met     Problem: Optimal Care of the Stroke Patient  Goal: Optimal acute care for the stroke patient  Outcome: Met     Problem: Knowledge Deficit - Stroke Education  Goal: Patient's knowledge of stroke and risk factors will improve  Outcome: Met     Problem: Psychosocial - Patient Condition  Goal: Patient's ability to verbalize feelings about condition will improve  Outcome: Met  Goal: Patient's ability to re-evaluate and adapt role responsibilities will improve  Outcome: Met     Problem: Discharge Planning - Stroke  Goal: Ensure Stroke Core Measures are met prior to discharge  Outcome: Met  Goal: Patient’s continuum of care needs will be met  Outcome: Met     Problem: Neuro Status  Goal: Neuro status will remain stable or improve  Outcome: Met     Problem: Hemodynamic Monitoring  Goal: Patient's hemodynamics, fluid balance and neurologic status will be stable or improve  Outcome: Met     Problem: Respiratory - Stroke Patient  Goal: Patient will achieve/maintain optimum respiratory rate/effort  Outcome: Met     Problem: Dysphagia  Goal: Dysphagia will improve  Outcome: Met     Problem: Risk for Aspiration  Goal: Patient's risk for aspiration will be absent or decrease  Outcome: Met     Problem: Urinary Elimination  Goal: Establish and maintain regular urinary output  Outcome: Met     Problem: Bowel Elimination  Goal: Establish and maintain regular bowel function  Outcome: Met     Problem: Mobility - Stroke  Goal: Patient's capacity to carry out activities will improve  Outcome: Met  Goal: Spasticity will be prevented or improved  Outcome: Met  Goal: Subluxation will be prevented or  improved  Outcome: Met     Problem: Self Care  Goal: Patient will have the ability to perform ADLs independently or with assistance (bathe, groom, dress, toilet and feed)  Outcome: Met     Problem: Knowledge Deficit - Standard  Goal: Patient and family/care givers will demonstrate understanding of plan of care, disease process/condition, diagnostic tests and medications  Outcome: Met     Problem: Pain - Standard  Goal: Alleviation of pain or a reduction in pain to the patient’s comfort goal  Outcome: Met     Problem: Skin Integrity  Goal: Skin integrity is maintained or improved  Outcome: Met     Problem: Fall Risk  Goal: Patient will remain free from falls  Outcome: Met

## 2023-08-17 LAB
BACTERIA UR CULT: ABNORMAL
BACTERIA UR CULT: ABNORMAL
SIGNIFICANT IND 70042: ABNORMAL
SITE SITE: ABNORMAL
SOURCE SOURCE: ABNORMAL

## 2023-08-28 ENCOUNTER — TELEPHONE (OUTPATIENT)
Dept: NEUROLOGY | Facility: MEDICAL CENTER | Age: 63
End: 2023-08-28
Payer: MEDICARE

## 2023-08-28 NOTE — TELEPHONE ENCOUNTER
NEUROLOGY PATIENT PRE-VISIT PLANNING     Patient was NOT contacted to complete PVP.  Note: Patient will not be contacted if there is no indication to call.     Patient Appointment is scheduled as: New Patient     Is visit type and length scheduled correctly? Yes    [facility] Patient is checked in Patient Demographics? Yes    3.   Is referral attached to visit? Yes    4. Were records received from referring provider? Yes    4. Patient was NOT contacted to have someone accompany them to visit.     5. Is this appointment scheduled as a Hospital Follow-Up?  Yes    6. Does the patient require any pre procedure or post procedure follow up? No    7. If any orders were placed at last visit or intended to be done for this visit do we have Results/Consult Notes? No  Labs - Labs ordered, completed on 8/2 to 8/15 and results are in chart.  Imaging - Imaging ordered, completed and results are in chart.  Referrals - No referrals were ordered at last office visit.  Note: If patient appointment is for lab or imaging review and patient did not complete the studies, check with provider if OK to reschedule patient until completed.    8. If patient appointment is for Botox - is order pended for provider? N/A    UEs: 3/5, LE: 3+/5

## 2023-09-04 ENCOUNTER — HOSPITAL ENCOUNTER (OUTPATIENT)
Dept: RADIOLOGY | Facility: MEDICAL CENTER | Age: 63
End: 2023-09-04
Attending: INTERNAL MEDICINE
Payer: MEDICARE

## 2023-09-04 DIAGNOSIS — Z86.73 HX OF TIA (TRANSIENT ISCHEMIC ATTACK) AND STROKE: ICD-10-CM

## 2023-09-04 DIAGNOSIS — I77.71 CAROTID ARTERY DISSECTION (HCC): ICD-10-CM

## 2023-09-04 PROCEDURE — 93880 EXTRACRANIAL BILAT STUDY: CPT

## 2023-09-04 PROCEDURE — 93880 EXTRACRANIAL BILAT STUDY: CPT | Mod: 26 | Performed by: INTERNAL MEDICINE

## 2023-09-05 ENCOUNTER — OFFICE VISIT (OUTPATIENT)
Dept: NEUROLOGY | Facility: MEDICAL CENTER | Age: 63
End: 2023-09-05
Attending: PSYCHIATRY & NEUROLOGY
Payer: MEDICARE

## 2023-09-05 ENCOUNTER — DOCUMENTATION (OUTPATIENT)
Dept: VASCULAR LAB | Facility: MEDICAL CENTER | Age: 63
End: 2023-09-05
Payer: MEDICARE

## 2023-09-05 VITALS
OXYGEN SATURATION: 99 % | RESPIRATION RATE: 18 BRPM | HEART RATE: 88 BPM | BODY MASS INDEX: 26.68 KG/M2 | DIASTOLIC BLOOD PRESSURE: 84 MMHG | TEMPERATURE: 97.4 F | WEIGHT: 169.97 LBS | SYSTOLIC BLOOD PRESSURE: 138 MMHG | HEIGHT: 67 IN

## 2023-09-05 DIAGNOSIS — I63.511 RIGHT MIDDLE CEREBRAL ARTERY STROKE (HCC): ICD-10-CM

## 2023-09-05 DIAGNOSIS — I65.22 CAROTID OCCLUSION, LEFT: ICD-10-CM

## 2023-09-05 DIAGNOSIS — I65.21 RIGHT CAVERNOUS CAROTID STENOSIS: ICD-10-CM

## 2023-09-05 DIAGNOSIS — I48.91 ATRIAL FIBRILLATION, UNSPECIFIED TYPE (HCC): ICD-10-CM

## 2023-09-05 PROBLEM — I63.9 ACUTE ISCHEMIC STROKE (HCC): Status: RESOLVED | Noted: 2023-08-12 | Resolved: 2023-09-05

## 2023-09-05 PROCEDURE — 3075F SYST BP GE 130 - 139MM HG: CPT | Performed by: PSYCHIATRY & NEUROLOGY

## 2023-09-05 PROCEDURE — 99212 OFFICE O/P EST SF 10 MIN: CPT | Performed by: PSYCHIATRY & NEUROLOGY

## 2023-09-05 PROCEDURE — 99204 OFFICE O/P NEW MOD 45 MIN: CPT | Performed by: PSYCHIATRY & NEUROLOGY

## 2023-09-05 PROCEDURE — 3079F DIAST BP 80-89 MM HG: CPT | Performed by: PSYCHIATRY & NEUROLOGY

## 2023-09-05 ASSESSMENT — FIBROSIS 4 INDEX: FIB4 SCORE: 1.57

## 2023-09-05 NOTE — PATIENT INSTRUCTIONS
Continue aspirin and brilinta for a total of 90 days, until 11/13/23  On 11/13/23, stop aspirin and continue Brilinta alone     Continue rosuvastatin 20mg daily

## 2023-09-05 NOTE — PROGRESS NOTES
Patient s/p GISSELL PCI in aug.   Carotid duplex demonstrates patent GISSELL.   Await f/u angio as scheduled.    Michael Bloch, MD  [facility]    
Surveillance calendar updated. 9/5/2023       
none

## 2023-09-05 NOTE — PROGRESS NOTES
Left message to schedule follow-up with Dr. Bloch.  Please schedule with Dr Bloch next available.

## 2023-09-05 NOTE — PROGRESS NOTES
Chief Complaint   Patient presents with    New Patient     Stroke bridge       History of present illness:  Juan Luis Rolon 63 y.o. female with A-fib s/p WATCHMAN in June 2023 not anticoagulated presents to stroke bridge clinic. She stopped anticoagulation with Apixaban after the WATCHMAN device was placed. 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 She was found to have bilateral ICA occlusions and received angioplasty of R ICA.  She was discharged on DAPT with ASA and brilinta due to plavix allergy.    She is allergic to plavix and gets hives.   She has a history of A-fib requiring cardioversion in the past. She did not want to continue Eliquis but did not have any bleeding complications from this drug. She elected for WATCHMAN closure instead of continued anticoagulation.     Past medical history:   Past Medical History:   Diagnosis Date    Arrhythmia 12/23/2022    Hypertension     Stroke (HCC) 5/11/2011    TIA       Past surgical history:   No past surgical history on file.    Family history:   No family history on file.    Social history:   Social History     Socioeconomic History    Marital status:      Spouse name: Not on file    Number of children: Not on file    Years of education: Not on file    Highest education level: Bachelor's degree (e.g., BA, AB, BS)   Occupational History    Not on file   Tobacco Use    Smoking status: Never    Smokeless tobacco: Never   Vaping Use    Vaping Use: Never used   Substance and Sexual Activity    Alcohol use: Yes     Alcohol/week: 3.5 oz     Types: 7 Glasses of wine per week     Comment: 2 per day    Drug use: No    Sexual activity: Not on file   Other Topics Concern    Not on file   Social History Narrative    Not on file     Social Determinants of Health     Financial Resource Strain: Low Risk  (8/14/2023)    Overall Financial Resource Strain (CARDIA)     Difficulty of Paying Living Expenses: Not hard at all   Food Insecurity: No  Food Insecurity (8/14/2023)    Hunger Vital Sign     Worried About Running Out of Food in the Last Year: Never true     Ran Out of Food in the Last Year: Never true   Transportation Needs: No Transportation Needs (8/14/2023)    PRAPARE - Transportation     Lack of Transportation (Medical): No     Lack of Transportation (Non-Medical): No   Physical Activity: Sufficiently Active (8/14/2023)    Exercise Vital Sign     Days of Exercise per Week: 5 days     Minutes of Exercise per Session: 30 min   Stress: Stress Concern Present (8/14/2023)    [facility] of Occupational Health - Occupational Stress Questionnaire     Feeling of Stress : To some extent   Social Connections: Socially Isolated (8/14/2023)    Social Connection and Isolation Panel [NHANES]     Frequency of Communication with Friends and Family: More than three times a week     Frequency of Social Gatherings with Friends and Family: Three times a week     Attends Sikhism Services: Never     Active Member of Clubs or Organizations: No     Attends Club or Organization Meetings: Never     Marital Status:    Intimate Partner Violence: Not on file   Housing Stability: Low Risk  (8/14/2023)    Housing Stability Vital Sign     Unable to Pay for Housing in the Last Year: No     Number of Places Lived in the Last Year: 1     Unstable Housing in the Last Year: No       Current medications:   Current Outpatient Medications   Medication    rosuvastatin (CRESTOR) 20 MG Tab    ticagrelor (BRILINTA) 90 MG Tab tablet    colchicine (COLCRYS) 0.6 MG Tab    aspirin (ASA) 81 MG Chew Tab chewable tablet    losartan (COZAAR) 25 MG Tab    terazosin (HYTRIN) 1 MG Cap    amLODIPine (NORVASC) 2.5 MG Tab    atenolol (TENORMIN) 50 MG Tab    spironolactone/hctz (ALDACTAZIDE) 25-25 MG Tab    L-Lysine 500 MG Tab    tretinoin (RETIN-A) 0.025 % cream    docosahexanoic acid (OMEGA 3 FA) 1000 MG CAPS     No current facility-administered medications for this visit.        Medication Allergy:  Allergies   Allergen Reactions    Atorvastatin     Codeine Vomiting    Morphine Vomiting    Plavix [Clopidogrel Bisulfate] Hives    Warfarin Hives       Physical examination:     Current NIHSS    1a. LOC: 0  1b. LOC Questions: 0  1c. LOC Commands: 0  2. Best Gaze:0  3. Visual Fields: 0  4. Facial Paresis: 0  5a. Motor arm left: 1  5b. Motor arm right: 0  6a. Motor leg left: 0  6b. Motor leg right: 0  7. Sensory: 0  8. Best Language: 0  9. Limb Ataxia: 0  10. Dysarthria: 0  11. Extinction/Inattention: 0    Total Score: 1        Current mRS 1    Labs:  I reviewed the following labs personally:  Lab Results   Component Value Date/Time    HBA1C 5.8 (H) 2023 06:17 PM      LDL: 48  HDL: 60  Total cholesterol: 123     Imagin/15/23 MRI BRAIN   FINDINGS:     Confluent area of acute infarction is noted in the right inferolateral frontal region, right insula, right basal ganglia with scattered foci of acute infarction in the right frontoparietal region. Additional small scattered foci of acute infarction are   identified in the left frontoparietal deep white matter. The gradient-echo images demonstrate a small focus of hypointensity in the right inferior frontal region, probably representing a microcalcification along the MCA branches rather than a   microhemorrhage. No definite petechial hemorrhage is seen.  Nonspecific T2 hyperintensities are noted in the periventricular and deep white matter, most likely related to chronic microvascular ischemia.  Remote left frontal subcortical infarct with encephalomalacia noted. Remote right medial frontal subcortical infarct with encephalomalacia is also noted.  Age-related volume loss noted with prominent sulci, cisterns and ventricles. Ventricular dilatation is proportionate to the degree of cerebral volume loss.  Bone marrow signal in the calvarium is within normal limits.  Inflammatory mucosal thickening is noted in both maxillary  sinuses.  Included portions of the mastoid air cells are within normal limits.  Included portions of the orbits are within normal limits     IMPRESSION:        Acute infarcts involving the right inferolateral frontal region, right basal ganglia and right frontal parietal cortex and subcortical region. Additional small scattered areas of acute infarction are noted in the left frontoparietal deep white matter.     Age-related volume loss and chronic microvascular ischemic changes.    CTA NECK   FINDINGS:  Aortic arch: conventional branching pattern.     There is atherosclerotic plaque of the aorta.     Right common carotid artery: Patent     Right internal carotid artery: Occluded or pseudooccluded     Left common carotid artery is patent.     Left internal carotid artery: Occluded     The right vertebral artery is patent without dissection or stenosis.     The left vertebral artery is patent without dissection or stenosis.     Vertebrobasilar confluence: The vertebrobasilar confluence appears normal.     Lung apices are clear     The soft tissues of the neck are within normal limits.     3D angiographic/MIP images of the vasculature confirm the vascular findings as described above.     IMPRESSION:     1.  Occlusion or severe occlusion of the RIGHT internal carotid artery.  2.  Occlusion of the LEFT internal carotid artery.    ASSESSMENT AND PLAN:  Problem List Items Addressed This Visit       Carotid occlusion, left    Atrial fibrillation (HCC)    Right cavernous carotid stenosis    Right middle cerebral artery stroke (HCC)       1. Right middle cerebral artery stroke (HCC)    2. Right cavernous carotid stenosis    3. Atrial fibrillation, unspecified type (HCC)    4. Carotid occlusion, left      Presumed Mechanism by TOAST  _x_  Large-artery atherosclerosis  __  Cardioembolism  __  Small-vessel occlusion  __  Stroke of other determined etiology   __  Stroke of undetermined etiology     Antithrombotic: continue  aspirin and brilinta for 90 days, until 11/13/23, followed by Brilinta alone  Blood pressure goal: < 140/90   LDL goal: < 70     63-year-old female with right MCA stroke in August.  She underwent thrombectomy of right M2 occlusion, as well as angioplasty of the right cavernous portion of the internal carotid artery.  She has a history of atrial fibrillation, status post Watchman device closure the left atrial appendage.  She was placed on dual antiplatelet therapy for 90 days.  I have instructed her on the and date as 90 days from initial drug therapy, on 11/13/2023, after which she will continue Brilinta alone.  The etiology of the stroke is secondary to the stenosis of the right internal carotid artery.  She will follow-up with interventional radiology for monitoring of the stenosis.  No further intervention is indicated unless there is a restenosis of this artery.  Of note, she has history of atrial fibrillation however the appearance of the stroke being limited to the right MCA territory, with no bilateral infarcts is most suggestive of the right ICA lesion as being the culprit.  There is no evidence of failure of the Watchman device.    FOLLOW-UP:   Return if symptoms worsen or fail to improve.    Total time spent for the day for this patient unrelated to procedure time is: 52 minutes. I spent 32 minutes in face to face time and I spent 15 minutes pre-charting and 5 minutes in post-visit documentation.      Dr. Hong Tucker D.O.  [facility]

## 2023-09-05 NOTE — Clinical Note
What is the reasoning behind dual antiplatelet therapy for 90 days in this case?  This is a patient with history of right cavernous internal carotid artery severe stenosis status post angioplasty, who had a right MCA stroke.

## 2023-09-12 ENCOUNTER — DOCUMENTATION (OUTPATIENT)
Dept: VASCULAR LAB | Facility: MEDICAL CENTER | Age: 63
End: 2023-09-12
Payer: MEDICARE

## 2023-09-12 ENCOUNTER — HOSPITAL ENCOUNTER (OUTPATIENT)
Dept: RADIOLOGY | Facility: MEDICAL CENTER | Age: 63
End: 2023-09-12
Attending: NURSE PRACTITIONER
Payer: MEDICARE

## 2023-09-12 DIAGNOSIS — I65.21 STENOSIS OF RIGHT CAROTID ARTERY: ICD-10-CM

## 2023-09-12 ASSESSMENT — ENCOUNTER SYMPTOMS
HEMOPTYSIS: 0
BRUISES/BLEEDS EASILY: 0
SHORTNESS OF BREATH: 1
SPEECH CHANGE: 0
FOCAL WEAKNESS: 0
GASTROINTESTINAL NEGATIVE: 1
FEVER: 0
DIAPHORESIS: 0
SENSORY CHANGE: 0
BLOOD IN STOOL: 0
CHILLS: 0
CARDIOVASCULAR NEGATIVE: 1
WEAKNESS: 0
WEIGHT LOSS: 0
CONSTITUTIONAL NEGATIVE: 1

## 2023-09-12 ASSESSMENT — LIFESTYLE VARIABLES: SUBSTANCE_ABUSE: 0

## 2023-09-12 NOTE — PROGRESS NOTES
Neuro IR recs reviewed.   Will repeat CTA neck in 6 mo (mar 2024) per their recommendation.  Agree with eliquis + low dose asa for now, pending [facility] medicine follow up.    Michael Bloch, MD  [facility]

## 2023-09-12 NOTE — Clinical Note
Put on kaley for CTA neck in march 2024 pending any further studies between now and then Delete or micheal done any other entries

## 2023-09-12 NOTE — PROGRESS NOTES
Neuro Interventional Service Follow Up     Re: Juan Luis Rolon     MRN: [id number]   : 1960    Juan Luis Rolon was seen today in follow up after endovascular neurointervention performed by Yariel Sanders MD at [facility] on 23. She was referred to our service by the neurocritical care service and is also under the care of Michael Bloch MD.    History of Present Illness:   presented with acute left side weakness, disorientation, and slurred speech on .  has a history of hypertension. Imaging revealed a left carotid artery occlusion and tandem thrombus in the right carotid artery system and the patient was referred for emergent stroke intervention. Dr. Sanders performed thrombectomy and angioplasty of the right intracranial ICA stenosis. There was a non flow limiting thrombus at A1 and an M2 branch thrombus and occlusion after intervention. The patient's MRI demonstrated a small completed right frontal stroke. Her clinical course was unremarkable. She was discharged from [facility] on August 15 with prescriptions for Brilinta and aspirin.    She is seen today for evaluation after the procedure. Today, the patient feels mostly back to baseline. She endorses compliance with DAPT but is having shortness of breath with the Brilinta. Her dyspnea is impacting her ability to recover from her stroke in that she cannot exercise and even taking stairs is difficult. She previously tolerated Eliquis well. She has allergies to warfarin and Plavix in which both cause hives. She specifically denies left side weakness or recurrent symptoms after starting DAPT. She reports her blood pressure is well controlled with medications. She denies smoking. She lives in [place] and is retired. She is unaccompanied by a support person to today's consultation.    Past Medical History:   Diagnosis Date    Arrhythmia 2022    Hypertension     Stroke (HCC) 2011    TIA     No past surgical history on  file.  Social History     Socioeconomic History    Marital status:      Spouse name: Not on file    Number of children: Not on file    Years of education: Not on file    Highest education level: Bachelor's degree (e.g., BA, AB, BS)   Occupational History    Not on file   Tobacco Use    Smoking status: Never    Smokeless tobacco: Never   Vaping Use    Vaping Use: Never used   Substance and Sexual Activity    Alcohol use: Yes     Alcohol/week: 3.5 oz     Types: 7 Glasses of wine per week     Comment: 2 per day    Drug use: No    Sexual activity: Not on file   Other Topics Concern    Not on file   Social History Narrative    Not on file     Social Determinants of Health     Financial Resource Strain: Low Risk  (8/14/2023)    Overall Financial Resource Strain (CARDIA)     Difficulty of Paying Living Expenses: Not hard at all   Food Insecurity: No Food Insecurity (8/14/2023)    Hunger Vital Sign     Worried About Running Out of Food in the Last Year: Never true     Ran Out of Food in the Last Year: Never true   Transportation Needs: No Transportation Needs (8/14/2023)    PRAPARE - Transportation     Lack of Transportation (Medical): No     Lack of Transportation (Non-Medical): No   Physical Activity: Sufficiently Active (8/14/2023)    Exercise Vital Sign     Days of Exercise per Week: 5 days     Minutes of Exercise per Session: 30 min   Stress: Stress Concern Present (8/14/2023)    [employer] [place] [employer] - Occupational Stress Questionnaire     Feeling of Stress : To some extent   Social Connections: Socially Isolated (8/14/2023)    Social Connection and Isolation Panel [NHANES]     Frequency of Communication with Friends and Family: More than three times a week     Frequency of Social Gatherings with Friends and Family: Three times a week     Attends [employer] Services: Never     Active Member of Clubs or Organizations: No     Attends Club or Organization Meetings: Never     Marital Status:     Intimate Partner Violence: Not on file   Housing Stability: Low Risk  (8/14/2023)    Housing Stability Vital Sign     Unable to Pay for Housing in the Last Year: No     Number of Places Lived in the Last Year: 1     Unstable Housing in the Last Year: No     No family history on file.    Review of Systems   Constitutional: Negative.  Negative for chills, diaphoresis, fever, malaise/fatigue and weight loss.   Respiratory:  Positive for shortness of breath. Negative for hemoptysis.    Cardiovascular: Negative.    Gastrointestinal: Negative.  Negative for blood in stool and melena.   Genitourinary:  Negative for hematuria.   Skin: Negative.    Neurological:  Negative for sensory change, speech change, focal weakness and weakness.   Endo/Heme/Allergies:  Does not bruise/bleed easily.   Psychiatric/Behavioral:  Negative for substance abuse.      A comprehensive 14-point review of systems was negative except as described above.     Labs:    Latest Reference Range & Units Most Recent   WBC 4.8 - 10.8 K/uL 5.0  8/14/23 04:35   RBC 4.20 - 5.40 M/uL 3.71 (L)  8/14/23 04:35   Hemoglobin 12.0 - 16.0 g/dL 12.0  8/14/23 04:35   Hematocrit 37.0 - 47.0 % 34.8 (L)  8/14/23 04:35   MCV 81.4 - 97.8 fL 93.8  8/14/23 04:35   MCH 27.0 - 33.0 pg 32.3  8/14/23 04:35   MCHC 32.2 - 35.5 g/dL 34.5  8/14/23 04:35   RDW 35.9 - 50.0 fL 41.6  8/14/23 04:35   Platelet Count 164 - 446 K/uL 253  8/14/23 04:35   MPV 9.0 - 12.9 fL 9.0  8/14/23 04:35   Neutrophils-Polys 44.00 - 72.00 % 66.10  8/14/23 04:35   Neutrophils (Absolute) 1.82 - 7.42 K/uL 3.33  8/14/23 04:35   Lymphocytes 22.00 - 41.00 % 19.80 (L)  8/14/23 04:35   Lymphs (Absolute) 1.00 - 4.80 K/uL 1.00  8/14/23 04:35   Monocytes 0.00 - 13.40 % 12.10  8/14/23 04:35   Monos (Absolute) 0.00 - 0.85 K/uL 0.61  8/14/23 04:35   Eosinophils 0.00 - 6.90 % 1.60  8/14/23 04:35   Eos (Absolute) 0.00 - 0.51 K/uL 0.08  8/14/23 04:35   Basophils 0.00 - 1.80 % 0.20  8/14/23 04:35   Baso (Absolute)  0.00 - 0.12 K/uL 0.01  8/14/23 04:35   Immature Granulocytes 0.00 - 0.90 % 0.20  8/14/23 04:35   Immature Granulocytes (abs) 0.00 - 0.11 K/uL 0.01  8/14/23 04:35   Nucleated RBC 0.00 - 0.20 /100 WBC 0.00  8/14/23 04:35   NRBC (Absolute) K/uL 0.00  8/14/23 04:35   Sodium 135 - 145 mmol/L 139  8/14/23 04:35   Potassium 3.6 - 5.5 mmol/L 4.2  8/14/23 04:35   Chloride 96 - 112 mmol/L 107  8/14/23 04:35   Co2 20 - 33 mmol/L 23  8/14/23 04:35   Anion Gap 7.0 - 16.0  9.0  8/14/23 04:35   Glucose 65 - 99 mg/dL 109 (H)  8/14/23 04:35   Bun 8 - 22 mg/dL 6 (L)  8/14/23 04:35   Creatinine 0.50 - 1.40 mg/dL 0.70  8/14/23 04:35   GFR (CKD-EPI) >60 mL/min/1.73 m 2 96  8/14/23 04:35   Calcium 8.5 - 10.5 mg/dL 9.1  8/14/23 04:35   Correct Calcium 8.5 - 10.5 mg/dL 9.3  8/13/23 03:20   AST(SGOT) 12 - 45 U/L 30  8/13/23 03:20   ALT(SGPT) 2 - 50 U/L 24  8/13/23 03:20   Alkaline Phosphatase 30 - 99 U/L 54  8/13/23 03:20   Total Bilirubin 0.1 - 1.5 mg/dL 0.6  8/13/23 03:20   Albumin 3.2 - 4.9 g/dL 4.0  8/13/23 03:20   Total Protein 6.0 - 8.2 g/dL 6.2  8/13/23 03:20   Globulin 1.9 - 3.5 g/dL 2.2  8/13/23 03:20   A-G Ratio g/dL 1.8  8/13/23 03:20   Phosphorus 2.5 - 4.5 mg/dL 4.0  8/14/23 04:35   Magnesium 1.5 - 2.5 mg/dL 2.1  8/14/23 04:35   Glycohemoglobin 4.0 - 5.6 % 5.8 (H)  8/12/23 18:17   Estim. Avg Glu mg/dL 120  8/12/23 18:17   Troponin T 6 - 19 ng/L 22 (H)  8/12/23 18:17   Urobilinogen, Urine Negative  1.0  8/15/23 08:00   POC Glucose, Blood 65 - 99 mg/dL 107 (H)  8/13/23 05:31   INR 0.87 - 1.13  1.06  8/12/23 18:17   PT 12.0 - 14.6 sec 13.7  8/12/23 18:17   APTT 24.7 - 36.0 sec 26.5  8/12/23 18:17   Istat Activated Clotting Time 74 - 137 sec 275 (H)  6/23/23 16:04   Color  Orange !  8/15/23 08:00   Character  Cloudy !  8/15/23 08:00   Specific Gravity <1.035  1.010  8/15/23 08:00   Ph 5.0 - 8.0  7.5  8/15/23 08:00   Glucose Negative mg/dL Negative  8/15/23 08:00   Ketones Negative mg/dL Negative  8/15/23 08:00   Bilirubin Negative   Negative  8/15/23 08:00   Occult Blood Negative  Large !  8/15/23 08:00   Protein Negative mg/dL 30 !  8/15/23 08:00   Nitrite Negative  Positive !  8/15/23 08:00   Leukocyte Esterase Negative  Large !  8/15/23 08:00   Micro Urine Req  Microscopic  8/15/23 08:00   WBC /hpf Packed !  8/15/23 08:00   RBC /hpf >150 !  8/15/23 08:00   Epithelial Cells /hpf Negative  8/15/23 08:00   Bacteria None /hpf Many !  8/15/23 08:00   Hyaline Cast /lpf 0-2  8/15/23 08:00   Hepatitis C Antibody Non-reactive  NON-REACTIVE (E)  9/27/21 08:19   Significant Indicator  POS !  8/15/23 11:20   Site  -  8/15/23 11:20   Source  UR  8/15/23 11:20   ABO Rh Confirm  O POS  8/13/23 03:20   ABO Grouping Only  O  8/12/23 18:17   Rh Grouping Only  POS  8/12/23 18:17   Antibody Screen-Cod  NEG  8/12/23 18:17   (L): Data is abnormally low  (H): Data is abnormally high  !: Data is abnormal  (E): External lab result    Radiology:   USD carotid 9/4/23 at [facility]:  CONCLUSIONS   Mild stenosis of the right internal carotid artery (<50%).    Occlusion of the left internal carotid artery.    Compared to the images of the prior study on 10/1/2021 - there has been no    significant change.     MRI brain 8/15/23 at [facility]:  Acute infarcts involving the right inferolateral frontal region, right basal ganglia and right frontal parietal cortex and subcortical region. Additional small scattered areas of acute infarction are noted in the left frontoparietal deep white matter.     Age-related volume loss and chronic microvascular ischemic changes.    USD LE arterial 8/15/23 at [facility]:   Vascular Laboratory   Conclusions   No evidence of pseudoaneurysm.    CT head w/o 8/13/23 at [facility]:  1.  Mild brain swelling and edema noted in the right frontal area consistent with subacute infarct.     2.  No significant mass effect or midline shift.     3.  No intracranial hemorrhage identified.     Neurointerventional Radiology Procedure 8/12/23 at [facility]:  1.  Severe stenosis  of cavernous segment of the right internal carotid artery likely representing acute plaque rupture.  2.  Successful angioplasty of the atherosclerotic stenosis with moderate improvement.  3.  Residual thrombus and occlusion in the right M2 segment  4.  Nonflow limiting residual thrombus in the distal right A1 segment.      CTP 8/12/23 at [facility]:  1.Cerebral blood flow less than 30% likely representing completed infarct = 0 mL  2.T Max more than 6 seconds likely representing combination of completed infarct and ischemia = 108 mL  3. Mismatched volume likely representing ischemic brain/penumbra= 108 mL  4.Please note that the cerebral perfusion was performed on the limited brain tissue around the basal ganglia region. Infarct/ischemia outside the CT perfusion sections may not be seen on this study.    CTA Head 8/12/23 at [facility]:  Addenda  Additional  impression: Occlusion of the petrous through proximal supraclinoid BILATERAL internal carotid arteries. Anterior circulation may be dependent on flow through posterior and anterior communicating arteries. This was also discussed with Dr Sanders.  IMPRESSION:     RIGHT M1 occlusion    CTA Neck 8/12/23 at [facility]:  1.  Occlusion or severe occlusion of the RIGHT internal carotid artery.  2.  Occlusion of the LEFT internal carotid artery.     CT Head w/o 8/12/23 at [facility]:  1. Cerebral atrophy.  2. White matter lucencies most consistent with small vessel ischemic change versus demyelination or gliosis.  3. Small areas of encephalomalacia in the BILATERAL frontal vertex Otherwise, Head CT without contrast with no acute findings. No evidence of acute cerebral infarction, hemorrhage or mass lesion.     ELIANE 7/25/23 at [facility]:  CONCLUSIONS  Normal LV function.  Watchman device in place in the L atrial   appendage.  No thrombus is present on the Watchman device.    ELIANE 6/23/23 at [facility]:  CONCLUSIONS  Normal LV function.  Watchman device in place in the L atrial   appendage.  No  thrombus is present on the Watchman device.    ELIANE 6/13/23 at [facility]:  CONCLUSIONS  s/p MARLYN closure with Watchman device.   No evidence of MARLYN thrombus. No pre-existing PFO or ASD.   Device is well positioned with good compression and no evidence of   bharati-device leaks.   Biatrial enlargement.   Normal biventricular function.   No pericardial effusions post procedure.    USD carotid arteries 12/13/13 at [facility]:   CONCLUSIONS    Occluded left internal carotid artery.    No signficant disease in the right carotid artery.    Normal vertebral flow.   Current Outpatient Medications   Medication Sig Dispense Refill    rosuvastatin (CRESTOR) 20 MG Tab Take 1 Tablet by mouth every evening. 30 Tablet 11    ticagrelor (BRILINTA) 90 MG Tab tablet Take 1 Tablet by mouth 2 times a day. 60 Tablet 6    colchicine (COLCRYS) 0.6 MG Tab Take 1 Tablet (0.6 mg) by mouth 2 times daily.      aspirin (ASA) 81 MG Chew Tab chewable tablet Chew 1 Tablet every day. 100 Tablet 1    losartan (COZAAR) 25 MG Tab TAKE 1 TABLET BY MOUTH  DAILY (Patient taking differently: Take 25 mg by mouth every day.) 90 Tablet 3    terazosin (HYTRIN) 1 MG Cap TAKE 1 CAPSULE BY MOUTH  EVERY NIGHT AT BEDTIME (Patient taking differently: Take 1 mg by mouth every day.) 90 Capsule 3    amLODIPine (NORVASC) 2.5 MG Tab TAKE 1 TABLET BY MOUTH  DAILY 90 Tablet 3    atenolol (TENORMIN) 50 MG Tab TAKE 1 TABLET BY MOUTH  DAILY 90 Tablet 3    spironolactone/hctz (ALDACTAZIDE) 25-25 MG Tab TAKE ONE-HALF TABLET BY  MOUTH DAILY (Patient taking differently: Take 0.5 Tablets by mouth every day.) 45 Tablet 3    L-Lysine 500 MG Tab Take 500 mg by mouth every day.      tretinoin (RETIN-A) 0.025 % cream Apply 1 Application topically every evening.      docosahexanoic acid (OMEGA 3 FA) 1000 MG CAPS Take 1,000 mg by mouth every day.       No current facility-administered medications for this visit.       Allergies   Allergen Reactions    Atorvastatin     Codeine Vomiting    Morphine  Vomiting    Plavix [Clopidogrel Bisulfate] Hives    Warfarin Hives       Physical Exam  Constitutional:       General: She is not in acute distress.     Appearance: She is not diaphoretic.   HENT:      Head: Normocephalic.   Eyes:      General: No scleral icterus.  Pulmonary:      Effort: Pulmonary effort is normal. No respiratory distress.   Abdominal:      General: There is no distension.   Skin:     General: Skin is warm and dry.      Coloration: Skin is not pale.      Findings: No erythema or rash.   Neurological:      General: No focal deficit present.      Mental Status: She is alert and oriented to person, place, and time. She is not disoriented.      Cranial Nerves: No cranial nerve deficit or facial asymmetry.      Sensory: Sensation is intact.      Motor: No weakness or tremor.      Coordination: Coordination normal.      Gait: Gait is intact. Gait normal.   Psychiatric:         Mood and Affect: Mood and affect normal.         Behavior: Behavior normal.         Thought Content: Thought content normal.         Cognition and Memory: Memory normal.         Judgment: Judgment normal.     Impression:   1. Acute intracranial right internal carotid artery occlusion, status post mechanical thrombectomy.  2. Chronic left carotid artery occlusion.  3. Stroke secondary to above.   4. Hyperlipidemia.  5. Hypertension.    Plan:   Yariel Sanders MD additionally evaluated the patient today. She has recovered well from her stroke. She is having side effects from Brilinta and has limited alternative options with her allergies to warfarin and clopidogrel. She tolerated Eliquis well in the past. We discussed the method of the thrombectomy and intracranial ICA angioplasty procedure at length and reviewed imaging studies. Her left carotid artery is chronically occluded and she has collateral flow, however there is an area of stenosis in the intracranial portion of the right ICA. We discussed the options for management of  this finding that include optimizing medical management and intracranial stenting.   We explained that the Wingspan stent is a Humanitarian Use Device and what that means to the patient. Wingspan is FDA-approved only for patients who are between 22 and 80 years old AND meet ALL the following criteria:  who have had two or more strokes despite aggressive medical management;  whose most recent stroke occurred more than seven days prior to planned treatment with Wingspan;  who have 70-99 percent stenosis due to atherosclerosis of the intracranial artery related to the recurrent strokes; and  who have made good recovery from the previous stroke and have a modified Carteret Scale score of three or less prior to Wingspan treatment. The Carteret scale is used to measure the degree of disability at the time of evaluation. Lower scores indicate less or no disability.    We discussed that she has not failed medical management, but the device is available should her stroke symptoms recur on medication. She has follow up scheduled with the vascular medicine clinic but due to her intolerance of Brilinta we instructed her to discontinue this medication, switch back to Eliquis at her previous dose, and continue aspirin 81 mg for now. We agree with whatever anticoagulation plan her treating specialists recommend given her limited options, and she will likely require lifetime medical therapy. Side effects, specifically bleeding, were again reviewed with instructions to contact us for minor bleeding and seek emergency care for hemorrhage. All questions were answered. We explained that the patient will need to have surveillance imaging after the procedure, which will be managed by us, and that future treatment depends on multiple factors including lab studies, imaging, and performance status. We reviewed known risk factors for vascular wellness that include uncontrolled hypertension, hyperlipidemia, and tobacco use. Modifiable risk  factors were discussed. We have planned that the next imaging study will a CTA performed in 6 months.       NADJA Myles with Yariel Sanders MD  Neuro Interventional Service   [address] ([address])  [place] [id number]  [phone]

## 2023-09-27 ENCOUNTER — OFFICE VISIT (OUTPATIENT)
Dept: VASCULAR LAB | Facility: MEDICAL CENTER | Age: 63
End: 2023-09-27
Payer: MEDICARE

## 2023-09-27 VITALS
SYSTOLIC BLOOD PRESSURE: 126 MMHG | HEIGHT: 67 IN | WEIGHT: 169 LBS | HEART RATE: 83 BPM | DIASTOLIC BLOOD PRESSURE: 86 MMHG | BODY MASS INDEX: 26.53 KG/M2

## 2023-09-27 DIAGNOSIS — I77.71 CAROTID ARTERY DISSECTION (HCC): ICD-10-CM

## 2023-09-27 DIAGNOSIS — E78.5 DYSLIPIDEMIA: ICD-10-CM

## 2023-09-27 DIAGNOSIS — Z86.73 HX OF TIA (TRANSIENT ISCHEMIC ATTACK) AND STROKE: ICD-10-CM

## 2023-09-27 DIAGNOSIS — R73.01 IFG (IMPAIRED FASTING GLUCOSE): ICD-10-CM

## 2023-09-27 DIAGNOSIS — Z79.01 ANTICOAGULATED: ICD-10-CM

## 2023-09-27 DIAGNOSIS — Z79.02 LONG TERM (CURRENT) USE OF ANTITHROMBOTICS/ANTIPLATELETS: ICD-10-CM

## 2023-09-27 DIAGNOSIS — I10 ESSENTIAL HYPERTENSION: ICD-10-CM

## 2023-09-27 DIAGNOSIS — E78.41 ELEVATED LIPOPROTEIN(A): ICD-10-CM

## 2023-09-27 PROCEDURE — 99212 OFFICE O/P EST SF 10 MIN: CPT

## 2023-09-27 PROCEDURE — 99214 OFFICE O/P EST MOD 30 MIN: CPT

## 2023-09-27 PROCEDURE — 3079F DIAST BP 80-89 MM HG: CPT

## 2023-09-27 PROCEDURE — 3074F SYST BP LT 130 MM HG: CPT

## 2023-09-27 RX ORDER — APIXABAN 5 MG/1
TABLET, FILM COATED ORAL
COMMUNITY
Start: 2023-09-12 | End: 2023-12-11 | Stop reason: SDUPTHER

## 2023-09-27 ASSESSMENT — ENCOUNTER SYMPTOMS
NERVOUS/ANXIOUS: 0
CHILLS: 0
WEAKNESS: 0
DIZZINESS: 0
CLAUDICATION: 0
BLURRED VISION: 0
BLOOD IN STOOL: 0
COUGH: 0
BRUISES/BLEEDS EASILY: 0
NAUSEA: 0
SHORTNESS OF BREATH: 0
DOUBLE VISION: 0
HEADACHES: 0
MYALGIAS: 0
WHEEZING: 0
FEVER: 0
PALPITATIONS: 1

## 2023-09-27 ASSESSMENT — FIBROSIS 4 INDEX: FIB4 SCORE: 1.57

## 2023-09-27 NOTE — PROGRESS NOTES
VASCULAR FOLLOW UP VISIT  Subjective:   Juan Luis Rolon is a 63 y.o. female who presents today 2023 for   Pt presents for f/u of carotid disease with dissection + unilateral occlusion, HTN, dyslipidemia, and previous TIA.     Subjective     Interval hx:   S/p Right ICA PCI, with thrombectomy 2023 after stroke like symptoms experienced while at home.  Has had follow up imaging and IR follow up earlier this month.  Doing well, no further symptoms.        Carotid artery disease, hx of dissection and occlusion:   See above, TIA/CVA 2023 with thrombectomy  Hx of TIA - currently no sx.      HTN:  No interval concerns, tolerating meds, no sx.    Home BP los/70s  Adherence to current HTN meds: compliant all of the time     Antithrombotic therapy:   Was started on brilinta s/p thrombectomy and angioplasty of ICA  Developed SOB, and changed over to ASA with Eliquis  Tolerating no bleeding, SOB resolved       Hyperlipidemia:   Stable, no current concerns, tolerating meds   Labs done at [facility]  Current treatment: lifestyle changes ,  burberine      Current Outpatient Medications:     rosuvastatin, 20 mg, Oral, Q EVENING, Taking    aspirin, 81 mg, Oral, DAILY, Taking    losartan, TAKE 1 TABLET BY MOUTH  DAILY (Patient taking differently: 25 mg, Oral, DAILY), Taking    terazosin, TAKE 1 CAPSULE BY MOUTH  EVERY NIGHT AT BEDTIME (Patient taking differently: 1 mg, Oral, DAILY), Taking    amLODIPine, TAKE 1 TABLET BY MOUTH  DAILY, Taking    atenolol, TAKE 1 TABLET BY MOUTH  DAILY, Taking    spironolactone/hctz, TAKE ONE-HALF TABLET BY  MOUTH DAILY (Patient taking differently: 0.5 Tablet, Oral, DAILY), Taking    L-Lysine, 500 mg, Oral, DAILY, Taking    tretinoin, 1 Application, Topical, Nightly, Taking    docosahexanoic acid, 1,000 mg, Oral, DAILY, Taking    Eliquis,      Social History     Tobacco Use    Smoking status: Never    Smokeless tobacco: Never   Vaping Use    Vaping Use: Never used   Substance  "Use Topics    Alcohol use: Yes     Alcohol/week: 3.5 oz     Types: 7 Glasses of wine per week     Comment: 2 per day    Drug use: No     DIET AND EXERCISE:  Weight Change: stable  Diet: common adult  Exercise: moderate regular exercise program- Cross Fit    Review of Systems   Constitutional:  Negative for chills and fever.   Eyes:  Negative for blurred vision and double vision.   Respiratory:  Negative for cough, shortness of breath and wheezing.    Cardiovascular:  Positive for palpitations. Negative for chest pain, claudication and leg swelling.   Gastrointestinal:  Negative for blood in stool and nausea.   Genitourinary:  Negative for hematuria.   Musculoskeletal:  Negative for myalgias.   Neurological:  Negative for dizziness, weakness and headaches.   Endo/Heme/Allergies:  Does not bruise/bleed easily.   Psychiatric/Behavioral:  The patient is not nervous/anxious.         Objective     Vitals:    09/27/23 1309 09/27/23 1313   BP: 132/86 126/86   BP Location: Left arm Left arm   Patient Position: Sitting Sitting   BP Cuff Size: Adult Adult   Pulse: 84 83   Weight: 76.7 kg (169 lb)    Height: 1.702 m (5' 7\")       BP Readings from Last 5 Encounters:   09/27/23 126/86   09/05/23 138/84   08/15/23 (!) 142/89   07/25/23 132/78   06/23/23 134/78      Body mass index is 26.47 kg/m².  Physical Exam  Constitutional:       General: She is not in acute distress.     Appearance: She is well-developed. She is not diaphoretic.   Cardiovascular:      Rate and Rhythm: Normal rate and regular rhythm.      Heart sounds: Normal heart sounds. No murmur heard.     No friction rub.   Pulmonary:      Effort: Pulmonary effort is normal. No respiratory distress.      Breath sounds: Normal breath sounds.   Musculoskeletal:         General: No tenderness. Normal range of motion.      Cervical back: Normal range of motion and neck supple.   Skin:     General: Skin is warm and dry.      Coloration: Skin is not pale.   Neurological:      " "Mental Status: She is alert and oriented to person, place, and time.      Motor: No weakness.      Gait: Gait normal.   Psychiatric:         Mood and Affect: Mood normal.         Behavior: Behavior normal.         Thought Content: Thought content normal.       DATA REVIEW    Labwork 6-24-14: LDL-C, 86, tsh 2.27, urine for ma 0.5, GFR > 60, HDL 73, trig 127    Labwork 6-23-15: LDL-C, 97, HDL-C, 79, Trig- 242, Non HDL-145, TSH-1.92, Na-128, K-4.2, Bun/Cr, 15/0.8, ALT 29, AST 41, Microalbumin-1.4, Urine Creatinine, 37.4          No results found for: \"CHOLSTRLTOT\", \"LDL\", \"HDL\", \"TRIGLYCERIDE\"    Lab Results   Component Value Date/Time    SODIUM 139 08/14/2023 04:35 AM    POTASSIUM 4.2 08/14/2023 04:35 AM    CHLORIDE 107 08/14/2023 04:35 AM    CO2 23 08/14/2023 04:35 AM    GLUCOSE 109 (H) 08/14/2023 04:35 AM    BUN 6 (L) 08/14/2023 04:35 AM    CREATININE 0.70 08/14/2023 04:35 AM     Lab Results   Component Value Date/Time    ALKPHOSPHAT 54 08/13/2023 03:20 AM    ASTSGOT 30 08/13/2023 03:20 AM    ALTSGPT 24 08/13/2023 03:20 AM    TBILIRUBIN 0.6 08/13/2023 03:20 AM       No results found for: \"LIPOPROTA\"   No results found for: \"APOB\"    Lab Results   Component Value Date    HBA1C 5.8 (H) 08/12/2023    No results found for: \"MICROALBCALC\", \"MALBCRT\", \"MALBEXCR\", \"JBGELP47\", \"MICROALBUR\", \"MICRALB\", \"UMICROALBUM\", \"MICROALBTIM\"     Blood Work 10/9/19:  Na -133, K+ 4.5, cret-0.76, bs-114, ALT 25, AST32, GFR >90  No lipid's done   Lab work 3/3/20:  Chol-T 192, trig-98, HDL-62 WWE189, Non-HDL-130    Blood work 7/13/0202  Na 138, K+ 4.1 cret 0.76, , ast 21, alt 21, GFR 84, CHO-Y 223, Trig 197, HDL 67,  Non     Blood work 10/15/2020 ([facility])  TC: 170, Trig 99, LDL 92, nonHDL 112    [facility] 3/17/22         Labs [facility] 8/2023  Total cholest 123, TG 75, HDL 60, LDL 48, non-HDL 63,     Lab Results   Component Value Date    SODIUM 139 08/14/2023    POTASSIUM 4.2 08/14/2023    CHLORIDE 107 " 08/14/2023    CO2 23 08/14/2023    GLUCOSE 109 (H) 08/14/2023    BUN 6 (L) 08/14/2023    CREATININE 0.70 08/14/2023        Lab Results   Component Value Date    WBC 5.0 08/14/2023    RBC 3.71 (L) 08/14/2023    HEMOGLOBIN 12.0 08/14/2023    HEMATOCRIT 34.8 (L) 08/14/2023    MCV 93.8 08/14/2023    MCH 32.3 08/14/2023    MCHC 34.5 08/14/2023    MPV 9.0 08/14/2023      IMAGING:     Carotid Duplex 12-5-2014  1. Chronic occlusion of the left internal carotid artery at its origin. Patent left vertebral artery.  2. Minimal heterogeneous plaque in the right carotid bulb and proximal right internal carotid artery. No right internal carotid artery rate limiting stenosis.    Carotid duplex 8-  CONCLUSIONS  1. Normal right carotid duplex examination.   2. Left internal carotid occlusion.   3. Normal bilateral subclavian and vertebral arterial exam.    CTA Oct 2015:   1. Left internal carotid artery occlusion  2. CTA [place] otherwise within normal limits  3. Probable left deep gray matter venous angioma, incidental  4. Underlying white matter changes are likely from previous small vessel ischemia/infarct    Blood work Oct 2015  LDL 91, nonHdl 143, gfr 74, na 134    CTA October 2016  Persistent occlusion left internal carotid artery  Inferior left basal ganglia and medial left temporal lobe venous angioma  Small vessel disease    Blood work September 2016-sodium 136, potassium 4.6, ALT 19, AST 26, total social to 24, temperature it's 237, HDL 70, , TSH 1.47, free T4 0.89    Carotid Duplex 10/16/17:   Occlusion of the left internal carotid artery.   No significant stenosis of the right carotid or bilateral subclavian or    bilateral vertebral arteries.    Blood work nov 2017 [facility]:  GFR 57, , nonHDL 133, tsh 1.65    Blood work 10/12/2018: NonHDL-139, TG-219, LDL-95, Gluc 93, GFR>90    Blood work 5/8/19:  GFR-80, cmp WNL, NonHDL-125, LDL-93,     Carotid duplex oct 2019:   Normal right  carotid exam.    Occlusion of the left internal carotid.    Compared to the prior study of 10/3/18 - there has been no significant    change    Carotid 10/2021     The bilateral subclavian and vertebral artery waveforms are antegrade and    normal in character and velocity.   Known occlusion of the LEFT internal carotid artery.     Normal right carotid exam.     EKG 1/3/2023- in office.  Sinus corby (HR 58), probable L atrial enlargement, probable anterior infarct, old       Echo 1/2023  CONCLUSIONS  No prior study is available for comparison.   Normal left ventricular systolic function.   No evidence of valvular abnormality based on Doppler evaluation.   Right ventricular systolic pressure is estimated to be 35 mmHg.     Medical Decision Making:  Today's Assessment / Status / Plan:     1. Essential hypertension  CBC WITHOUT DIFFERENTIAL    Comp Metabolic Panel      2. Long term (current) use of antithrombotics/antiplatelets  CBC WITHOUT DIFFERENTIAL      3. Anticoagulated  CBC WITHOUT DIFFERENTIAL      4. Dyslipidemia  Lipoprotein (a)      5. Elevated lipoprotein(a)  Lipoprotein (a)      6. Hx of TIA (transient ischemic attack) and stroke        7. Carotid artery dissection (HCC)        8. IFG (impaired fasting glucose)            Patient Type: Secondary Prevention    Etiology of Established CVD if Present:     1. Carotid dissection with history of TIA and unilateral carotid occlusion: workup for vasculitis by rheumatology was negative and no evidence of fibromuscular dysplasia in renal arteries.   Prior imaging stable compared to previous.  New TIA.CVA 8/2023 with left carotid artery occlusion and thrombus in RCA, s/p thombectomy and angioplasty of Right intracranial ICA stenosis.  MRI with small completed right frontal stroke.     SOB on brilinta, now tolerating ASA and Eliquis  - Continue secondary prevention med mgmt   - Continue antiplatelet/anticoagulation therapy, risk factor modification, and surveillance.         2. TIA with occular symptoms, 2016- New TIA.CVA 8/2023 with left carotid artery occlusion and thrombus in RCA, s/p thombectomy and angioplasty of Right intracranial ICA stenosis.  MRI with small completed right frontal stroke.     SOB on brilinta, now tolerating ASA and Eliquis.   IR recommend CTA neck in 6 month due 3/2024 - not ordered  -Continue Eliquis and ASA and RF mod. Discussed 911 for future TIA's and ER visits.       3. Venous angioma-Asymptomatic. Stable on CTA.    -Consider repeat imaging in future, particularly if headaches return.  Otherwise defer to pcp    Lipid Management:   Qualifies for Statin Therapy Based on 2018 ACC/AHA Guidelines: yes  The ASCVD Risk score (Kenia DK, et al., 2019) failed to calculate. n/a   Currently on Statin: No  Patient qualifies for statin therapy based on ACC/AHA guidelines given established ASCVD; however, it was non-atherosclerotic  Has not tolerated statins well in past, and has repeatedly refused additional medications for lipid management  Discussed her overall increased risk d/t elevated lp(a) 169  LDL has consistently been >100, none on record <70; no new recent labwork  Recommended re-trial of statin therapy, even low dose to reduce her overall risk of MI/stroke  Also discussed non-statin therapy, which likely would not reduce her LDL to goal of <70 but since she is very reluctant to trial statin, did offer this as an alternative.  Lp(a) = 169 - independent risk factor - reviewed niacin but unlikely CV benefit and likely intolerable side effects   At goal on most recent labs 9/2023  Plan:  - Continue to intensify TLC - continue dietary change for decreasing TG as well as active lifestyle  - gave handout on lp(a) previously  - continue berberine   - continue cholestoff   - continue fiber in diet through supplementation   - consider Zetia in future if needed or low-dose statin- declines   - update fasting labwork prior to next appt    Blood Pressure  Management:  ACC/AHA BP goal <130/80  Reasonable control at home; high in office- white coat effect     Good control on previous abpm  No secondary cause identified  Did not tolerate Diovan due to fogginess and dizziness - tolerates low dose losartan  Had leg swelling on higher doses of Norvasc  Dizziness with position changes frequently problematic  Would like to avoid over treatment, important to maintain adequate cerebral perfusion.    Plan:  - Continue atenolol 50mg daily- for now; defer to cardiology   - Continue amlodipine 2.5mg daily  - Continue spironolactone/ hydrochlorothiazide one half pill daily  - Continue terazosin 1mg qhs  - Continue losartan 25mg daily  - Encouraged more home BP monitoring - bring in log to next appt, adjust therapy if indicated.      Glycemic Status: Normal, but hx of IFG  -check a1c, per pt request    Anti-Platelet/Anti-Coagulant Tx: yes  Hold ASA d/t 50% increased risk of bleeding when taking concurrently with OAC  - Continue Eliquis 5mg BID  - Report any bleeding immediately   - update CBC prior to next appt    LIFESTYLE INTERVENTIONS:    SMOKING:   reports that she has never smoked. She has never used smokeless tobacco.   - continued complete avoidance of all tobacco products     PHYSICAL ACTIVITY: continue healthy activity to improve CV fitness.  In general, targeting >150min/week of moderate-level activity. Ok to continue with crossfit exercise.      WEIGHT MANAGEMENT AND NUTRITION: Dietary plan was discussed with patient at this visit including [place]iter[place] diet       Other:     New onset Atrial Fibrillation- Recent cardioversion at [facility] after going to ED with dizziness and established with cards.  Referred to [facility] cardiology as well. Started and is tolerating OAC with Eliquis.  Has not had any further s/s of atrial fib.  Consider changing different BB but will leave that up to cardiology.    - Defer further management to cards.     Instructed to follow-up with  PCP for remainder of adult medical needs: yes  We will partner with other providers in the management of established vascular disease and cardiometabolic risk factors.     Studies to Be Obtained: CTA neck due 3/2024 - not ordered  Labs to Be Obtained: lipids, CMP, CBC, a1c, lp(a)     Follow up in: as scheduled with Dr Bloch in [place]     EVANGELINA Gusman  [facility]   [facility]   [phone]

## 2023-10-02 ENCOUNTER — TELEPHONE (OUTPATIENT)
Dept: VASCULAR LAB | Facility: MEDICAL CENTER | Age: 63
End: 2023-10-02
Payer: MEDICARE

## 2023-10-02 NOTE — TELEPHONE ENCOUNTER
Standing lab orders from Charmaine SAEZ mailed to pt address on file per request       Edwardo Watson  [facility]   P#[phone]  Fax#[phone]

## 2023-10-13 DIAGNOSIS — I10 ESSENTIAL HYPERTENSION: ICD-10-CM

## 2023-10-13 RX ORDER — SPIRONOLACTONE AND HYDROCHLOROTHIAZIDE 25; 25 MG/1; MG/1
TABLET ORAL
Qty: 45 TABLET | Refills: 3 | Status: SHIPPED | OUTPATIENT
Start: 2023-10-13

## 2023-12-11 DIAGNOSIS — I48.91 ATRIAL FIBRILLATION, UNSPECIFIED TYPE (HCC): ICD-10-CM

## 2023-12-12 NOTE — TELEPHONE ENCOUNTER
Received request via: Pharmacy    Was the patient seen in the last year in this department? Yes, LV 9/27/23 ludy/ Charmaine    Does the patient have an active prescription (recently filled or refills available) for medication(s) requested? No    Does the patient have USP Plus and need 100 day supply (blood pressure, diabetes and cholesterol meds only)? Patient does not have SCP

## 2023-12-13 ENCOUNTER — TELEPHONE (OUTPATIENT)
Dept: VASCULAR LAB | Facility: MEDICAL CENTER | Age: 63
End: 2023-12-13
Payer: MEDICARE

## 2023-12-13 RX ORDER — APIXABAN 5 MG/1
5 TABLET, FILM COATED ORAL 2 TIMES DAILY
Qty: 60 TABLET | Refills: 5 | Status: SHIPPED | OUTPATIENT
Start: 2023-12-13

## 2023-12-13 NOTE — TELEPHONE ENCOUNTER
Received Refill PA request via [facility]  for ELIQUIS 5MG TABLETS . (Quantity:180, Day Supply:90)     Insurance: [employer] [facility]   Member ID:   [id number]  BIN: [id number]  PCN: [id number]  Group: [id number]     Ran Test claim via Elma & medication  rejects stating RTS 2/12/2024. Pt is not eligible for [facility] pharmacy shipping guidelines. Will release Rx to Pharmacy on file: [facility]---[address], [facility] [address].    Belle Dougherty, SABINA, PhT  Pharmacy Liaison (Rx Coordinator)  P: [phone]  12/13/2023 2:48 PM

## 2024-01-08 DIAGNOSIS — I65.21 RIGHT CAVERNOUS CAROTID STENOSIS: ICD-10-CM

## 2024-01-08 DIAGNOSIS — I77.71 CAROTID ARTERY DISSECTION (HCC): ICD-10-CM

## 2024-01-08 DIAGNOSIS — I65.22 CAROTID OCCLUSION, LEFT: ICD-10-CM

## 2024-01-08 NOTE — PROGRESS NOTES
Studies to Be Obtained: CTA neck due 3/2024     Orders placed for vascular surveillance imaging.    Firm58 message sent to pt to remind that they are due for their surveillance vascular imaging.       Christiana Blankenship R.N.   [facility]

## 2024-01-12 ENCOUNTER — OFFICE VISIT (OUTPATIENT)
Dept: VASCULAR LAB | Facility: MEDICAL CENTER | Age: 64
End: 2024-01-12
Attending: INTERNAL MEDICINE
Payer: MEDICARE

## 2024-01-12 DIAGNOSIS — I77.71 CAROTID ARTERY DISSECTION (HCC): ICD-10-CM

## 2024-01-12 DIAGNOSIS — R73.01 IMPAIRED FASTING GLUCOSE: ICD-10-CM

## 2024-01-12 DIAGNOSIS — I48.91 ATRIAL FIBRILLATION, UNSPECIFIED TYPE (HCC): ICD-10-CM

## 2024-01-12 DIAGNOSIS — I10 ESSENTIAL HYPERTENSION: ICD-10-CM

## 2024-01-12 DIAGNOSIS — E78.5 DYSLIPIDEMIA: ICD-10-CM

## 2024-01-12 PROCEDURE — 99215 OFFICE O/P EST HI 40 MIN: CPT | Performed by: INTERNAL MEDICINE

## 2024-01-12 NOTE — PROGRESS NOTES
VASCULAR FOLLOW UP VISIT  Subjective:   Juan Luis Rolon is a 63 y.o. female who presents 24 for vascular follow-up    This visit was conducted via Zoom video call using secure and encrypted video conferencing technology to reduce spread of and/or potential exposures to COVID.  The patient was in a private location (home) in the [facility].    The patient's identity was confirmed and verbal consent was obtained for this virtual visit.     Subjective     HPI    Carotid disease:  S/p Right ICA PCI, with thrombectomy 2023 after stroke like symptoms experienced while at home.    Previous history of left carotid occlusion due to presumed dissection  No residual neurologic deficits  No recurrent TIA or stroke symptoms    HTN:  No interval concerns, tolerating meds, no sx.    Home BP los to low 130s/70s -not taking very often  Adherence to current HTN meds: compliant all of the time   No change to her medications    Antithrombotic therapy:   Was started on brilinta s/p thrombectomy and angioplasty of ICA  Developed SOB, and changed over to ASA with Eliquis  As previous intolerance to clopidogrel  Tolerating no bleeding, SOB resolved       Hyperlipidemia:   Tolerating statin without myalgias    Social History     Tobacco Use    Smoking status: Never    Smokeless tobacco: Never   Vaping Use    Vaping Use: Never used   Substance Use Topics    Alcohol use: Yes     Alcohol/week: 3.5 oz     Types: 7 Glasses of wine per week     Comment: 2 per day    Drug use: No     DIET AND EXERCISE:  Weight Change: down about 5 pounds and maintained  Diet: common adult  Exercise: moderate regular exercise program-mostly cross Fit         Objective     There were no vitals filed for this visit.     BP Readings from Last 5 Encounters:   23 126/86   23 138/84   08/15/23 (!) 142/89   23 132/78   23 134/78      There is no height or weight on file to calculate BMI.  Physical Exam  Constitutional:      "  General: She is not in acute distress.     Appearance: She is well-developed. She is not diaphoretic.   Pulmonary:      Effort: Pulmonary effort is normal. No respiratory distress.   Skin:     Coloration: Skin is not pale.   Neurological:      Mental Status: She is alert and oriented to person, place, and time.      Cranial Nerves: No cranial nerve deficit.      Coordination: Coordination normal.   Psychiatric:         Mood and Affect: Mood normal.         Behavior: Behavior normal.       DATA REVIEW    Labwork 6-24-14: LDL-C, 86, tsh 2.27, urine for ma 0.5, GFR > 60, HDL 73, trig 127    Labwork 6-23-15: LDL-C, 97, HDL-C, 79, Trig- 242, Non HDL-145, TSH-1.92, Na-128, K-4.2, Bun/Cr, 15/0.8, ALT 29, AST 41, Microalbumin-1.4, Urine Creatinine, 37.4          No results found for: \"CHOLSTRLTOT\", \"LDL\", \"HDL\", \"TRIGLYCERIDE\"    Lab Results   Component Value Date/Time    SODIUM 139 08/14/2023 04:35 AM    POTASSIUM 4.2 08/14/2023 04:35 AM    CHLORIDE 107 08/14/2023 04:35 AM    CO2 23 08/14/2023 04:35 AM    GLUCOSE 109 (H) 08/14/2023 04:35 AM    BUN 6 (L) 08/14/2023 04:35 AM    CREATININE 0.70 08/14/2023 04:35 AM     Lab Results   Component Value Date/Time    ALKPHOSPHAT 54 08/13/2023 03:20 AM    ASTSGOT 30 08/13/2023 03:20 AM    ALTSGPT 24 08/13/2023 03:20 AM    TBILIRUBIN 0.6 08/13/2023 03:20 AM       No results found for: \"LIPOPROTA\"   No results found for: \"APOB\"    Lab Results   Component Value Date    HBA1C 5.8 (H) 08/12/2023    No results found for: \"MICROALBCALC\", \"MALBCRT\", \"MALBEXCR\", \"XKQQXM64\", \"MICROALBUR\", \"MICRALB\", \"UMICROALBUM\", \"MICROALBTIM\"     Blood Work 10/9/19:  Na -133, K+ 4.5, cret-0.76, bs-114, ALT 25, AST32, GFR >90  No lipid's done   Lab work 3/3/20:  Chol-T 192, trig-98, HDL-62 OOT825, Non-HDL-130    Blood work 7/13/0202  Na 138, K+ 4.1 cret 0.76, , ast 21, alt 21, GFR 84, CHO-Y 223, Trig 197, HDL 67,  Non     Blood work 10/15/2020 ([facility])  TC: 170, Trig 99, LDL 92, " nonHDL 112    [facility] 3/17/22         Labs [facility] 8/2023  Total cholest 123, TG 75, HDL 60, LDL 48, non-HDL 63,     Lab Results   Component Value Date    SODIUM 139 08/14/2023    POTASSIUM 4.2 08/14/2023    CHLORIDE 107 08/14/2023    CO2 23 08/14/2023    GLUCOSE 109 (H) 08/14/2023    BUN 6 (L) 08/14/2023    CREATININE 0.70 08/14/2023        Lab Results   Component Value Date    WBC 5.0 08/14/2023    RBC 3.71 (L) 08/14/2023    HEMOGLOBIN 12.0 08/14/2023    HEMATOCRIT 34.8 (L) 08/14/2023    MCV 93.8 08/14/2023    MCH 32.3 08/14/2023    MCHC 34.5 08/14/2023    MPV 9.0 08/14/2023      IMAGING:     Carotid Duplex 12-5-2014  1. Chronic occlusion of the left internal carotid artery at its origin. Patent left vertebral artery.  2. Minimal heterogeneous plaque in the right carotid bulb and proximal right internal carotid artery. No right internal carotid artery rate limiting stenosis.    Carotid duplex 8-  CONCLUSIONS  1. Normal right carotid duplex examination.   2. Left internal carotid occlusion.   3. Normal bilateral subclavian and vertebral arterial exam.    CTA Oct 2015:   1. Left internal carotid artery occlusion  2. CTA [place] otherwise within normal limits  3. Probable left deep gray matter venous angioma, incidental  4. Underlying white matter changes are likely from previous small vessel ischemia/infarct    Blood work Oct 2015  LDL 91, nonHdl 143, gfr 74, na 134    CTA October 2016  Persistent occlusion left internal carotid artery  Inferior left basal ganglia and medial left temporal lobe venous angioma  Small vessel disease    Blood work September 2016-sodium 136, potassium 4.6, ALT 19, AST 26, total social to 24, temperature it's 237, HDL 70, , TSH 1.47, free T4 0.89    Carotid Duplex 10/16/17:   Occlusion of the left internal carotid artery.   No significant stenosis of the right carotid or bilateral subclavian or    bilateral vertebral arteries.    Blood work nov 2017 [facility]  [place]:  GFR 57, , nonHDL 133, tsh 1.65    Blood work 10/12/2018: NonHDL-139, TG-219, LDL-95, Gluc 93, GFR>90    Blood work 5/8/19:  GFR-80, cmp WNL, NonHDL-125, LDL-93,     Carotid duplex oct 2019:   Normal right carotid exam.    Occlusion of the left internal carotid.    Compared to the prior study of 10/3/18 - there has been no significant    change    Carotid 10/2021     The bilateral subclavian and vertebral artery waveforms are antegrade and    normal in character and velocity.   Known occlusion of the LEFT internal carotid artery.     Normal right carotid exam.     EKG 1/3/2023- in office.  Sinus corby (HR 58), probable L atrial enlargement, probable anterior infarct, old    Echo 1/2023  CONCLUSIONS  No prior study is available for comparison.   Normal left ventricular systolic function.   No evidence of valvular abnormality based on Doppler evaluation.   Right ventricular systolic pressure is estimated to be 35 mmHg.    ELIANE June 2023   normal LV function.  Watchman device in place in the L atrial   appendage.  No thrombus is present on the Watchman device.      ELIANE July 2023  Compared to the images of the prior study on 6/23/2023, no significant   changes.  Normal left ventricular size and systolic function.  Well-seated Watchman device with peridevice leak measuring 2.8 mm.    CTA head August 2023  RIGHT M1 occlusion     CTA neck August 2023  1.  Occlusion or severe occlusion of the RIGHT internal carotid artery.  2.  Occlusion of the LEFT internal carotid artery.    Neuro IR procedure August 2023  1.  Severe stenosis of cavernous segment of the right internal carotid artery likely representing acute plaque rupture.  2.  Successful angioplasty of the atherosclerotic stenosis with moderate improvement.  3.  Residual thrombus and occlusion in the right M2 segment  4.  Nonflow limiting residual thrombus in the distal right A1 segment.    Echo August 2023  Prior study on 07/25/2023, compared to the  report of the prior study,   there has been no significant change.   Limited echocardiogram.  Normal left ventricular systolic function.  The left ventricular ejection fraction is visually estimated to be 55%.  No evidence of right to left shunt by agitated saline study.  A definite cardiac source for a systemic thromboembolic event is not   identified, failure to do so does not exclude its presence. If   clinically indicated, a transesophageal study would be useful.     MR brain August 2023  Acute infarcts involving the right inferolateral frontal region, right basal ganglia and right frontal parietal cortex and subcortical region. Additional small scattered areas of acute infarction are noted in the left frontoparietal deep white matter.  Age-related volume loss and chronic microvascular ischemic changes.    Carotid duplex September 2023   Mild stenosis of the right internal carotid artery (<50%).    Occlusion of the left internal carotid artery.    Compared to the images of the prior study on 10/1/2021 - there has been no    significant change.            Medical Decision Making:  Today's Assessment / Status / Plan:     1. Carotid artery dissection (HCC)        2. Atrial fibrillation, unspecified type (HCC)        3. Essential hypertension  Comp Metabolic Panel    MICROALBUMIN CREAT RATIO URINE      4. Dyslipidemia  Lipid Profile    LIPOPROTEIN A    Comp Metabolic Panel      5. Impaired fasting glucose  HEMOGLOBIN A1C          Patient Type: Secondary Prevention    Etiology of Established CVD if Present:     1.  Complex cerebrovascular disease with distant history of left carotid occlusion presumed to be due to dissection with distal right carotid occlusion due to suspected ruptured atherosclerotic plaque with multifocal stroke in August 2023 status post thrombectomy and angioplasty.   No residual  No recurrent TIA or stroke symptoms  Previous workup for fibromuscular disease in other vascular beds or vasculitis  was unremarkable  Plan:  -Continue medical management and lifestyle changes per below  -Repeat CTA neck in March 2024 as scheduled  -911 with any recurrent TIA or stroke symptoms    2.  Atrial fibrillation status post ablation and Watchman in 2020 -defer management of rhythm and rate to cardiology      Lipid Management:   High lipoprotein a increases risk of recurrence  Goal LDL less than 55 non-HDL less than 85  Seems to have reasonable control on most recent blood work  Plan:  -Continue rosuvastatin 20 mg daily  -Recheck fast lipid panel and lipoprotein a prior to next visit    Blood Pressure Management:  ACC/AHA BP goal <130/80  Reasonable control at home; high in office- white coat effect     Good control on previous abpm  No secondary cause identified  Did not tolerate Diovan due to fogginess and dizziness - tolerates low dose losartan  Had leg swelling on higher doses of Norvasc  Dizziness with position changes frequently problematic  Would like to avoid over treatment, important to maintain adequate cerebral perfusion.    Plan:  - Continue atenolol 50mg daily- for now; defer to cardiology   - Continue amlodipine 2.5mg daily  - Continue spironolactone/ hydrochlorothiazide one half pill daily  - Continue terazosin 1mg qhs  - Continue losartan 25mg daily  - Encouraged more home BP monitoring - bring in log to next appt, adjust therapy if indicated.      Glycemic Status: History of IFG  -Continue lifestyle modification  -Recheck fasting glucose and A1c    Anti-Platelet/Anti-Coagulant Tx: yes  As we discussed, difficult to determine appropriate combination of anticoagulant and antiplatelet drugs in the setting  Has had shortness of breath on Brilinta  Did not tolerate clopidogrel in the past  Tolerating current therapy without bleeding  - Continue Eliquis 5mg BID  -Continue concomitant low-dose aspirin 81 mg a day until at least next follow-up visit  - Report any bleeding immediately   - update CBC prior to next  appt    LIFESTYLE INTERVENTIONS:    SMOKING:   reports that she has never smoked. She has never used smokeless tobacco.   - continued complete avoidance of all tobacco products     PHYSICAL ACTIVITY: Continue regular CrossFit    WEIGHT MANAGEMENT AND NUTRITION: Dietary plan was discussed with patient at this visit including [place] diet.  Maintain weight loss        Instructed to follow-up with PCP for remainder of adult medical needs: yes  We will partner with other providers in the management of established vascular disease and cardiometabolic risk factors.     Studies to Be Obtained: CTA neck scheduled March 2024  Labs to Be Obtained: As above prior to next visit     Follow up in: March after blood work and imaging    Total time: 45 min - chart review/prep, review of other providers' records, imaging/lab review, face-to-face time for history/examination, ordering, prescribing,  review of results/meds/ treatment plan with patient/family/caregiver, documentation in EMR, care coordination (as needed)     Michael J Bloch, M.D.  [facility]   [facility]   [phone]     Cc:  PRINCESS Samuels

## 2024-02-16 DIAGNOSIS — I10 ESSENTIAL HYPERTENSION: ICD-10-CM

## 2024-02-16 RX ORDER — AMLODIPINE BESYLATE 2.5 MG/1
TABLET ORAL
Qty: 90 TABLET | Refills: 3 | Status: SHIPPED | OUTPATIENT
Start: 2024-02-16

## 2024-03-01 ENCOUNTER — HOSPITAL ENCOUNTER (OUTPATIENT)
Dept: RADIOLOGY | Facility: MEDICAL CENTER | Age: 64
End: 2024-03-01
Attending: INTERNAL MEDICINE
Payer: MEDICARE

## 2024-03-01 DIAGNOSIS — I65.22 CAROTID OCCLUSION, LEFT: ICD-10-CM

## 2024-03-01 DIAGNOSIS — I65.21 RIGHT CAVERNOUS CAROTID STENOSIS: ICD-10-CM

## 2024-03-01 DIAGNOSIS — I77.71 CAROTID ARTERY DISSECTION (HCC): ICD-10-CM

## 2024-03-01 PROCEDURE — 70498 CT ANGIOGRAPHY NECK: CPT

## 2024-03-01 PROCEDURE — 700117 HCHG RX CONTRAST REV CODE 255: Performed by: INTERNAL MEDICINE

## 2024-03-01 RX ADMIN — IOHEXOL 100 ML: 350 INJECTION, SOLUTION INTRAVENOUS at 12:54

## 2024-03-02 ENCOUNTER — DOCUMENTATION (OUTPATIENT)
Dept: VASCULAR LAB | Facility: MEDICAL CENTER | Age: 64
End: 2024-03-02
Payer: MEDICARE

## 2024-03-02 NOTE — PROGRESS NOTES
CTA neck reviewed.   Anticipate repeat CTA neck in one year (march 2025) pending further recs from neuroIR.   Will d/w patient at follow up visit    Michael Bloch, MD  [facility]

## 2024-03-06 ENCOUNTER — TELEPHONE (OUTPATIENT)
Dept: VASCULAR LAB | Facility: MEDICAL CENTER | Age: 64
End: 2024-03-06
Payer: MEDICARE

## 2024-03-06 DIAGNOSIS — I48.91 ATRIAL FIBRILLATION, UNSPECIFIED TYPE (HCC): ICD-10-CM

## 2024-03-06 DIAGNOSIS — I77.71 CAROTID ARTERY DISSECTION (HCC): ICD-10-CM

## 2024-03-06 DIAGNOSIS — I10 ESSENTIAL HYPERTENSION: ICD-10-CM

## 2024-03-06 NOTE — TELEPHONE ENCOUNTER
Established patient  Chart prep for upcoming appointment with Dr. Bloch    Any pending/incomplete orders from last visit? Yes LVM  Labs, patient reminded to try to complete prior to appointment  Were any records requested?  No  Correct appointment type (New/Established/virtual)? Yes  Referral up to date? Yes renewal ordered and sent to provider to co-sign   Referral attached to appointment (renewals and New patients only)? N/A (established with up-to-date referral)    Anh Dean, Med Ass't  [facility]  Ph. [phone]  Fx. [phone]

## 2024-03-12 ENCOUNTER — OFFICE VISIT (OUTPATIENT)
Dept: VASCULAR LAB | Facility: MEDICAL CENTER | Age: 64
End: 2024-03-12
Attending: INTERNAL MEDICINE
Payer: MEDICARE

## 2024-03-12 VITALS
HEIGHT: 67 IN | SYSTOLIC BLOOD PRESSURE: 119 MMHG | WEIGHT: 168 LBS | HEART RATE: 80 BPM | DIASTOLIC BLOOD PRESSURE: 77 MMHG | BODY MASS INDEX: 26.37 KG/M2

## 2024-03-12 DIAGNOSIS — E78.5 DYSLIPIDEMIA: ICD-10-CM

## 2024-03-12 DIAGNOSIS — E78.41 ELEVATED LIPOPROTEIN(A): ICD-10-CM

## 2024-03-12 DIAGNOSIS — I77.71 CAROTID ARTERY DISSECTION (HCC): ICD-10-CM

## 2024-03-12 DIAGNOSIS — I48.91 ATRIAL FIBRILLATION, UNSPECIFIED TYPE (HCC): ICD-10-CM

## 2024-03-12 DIAGNOSIS — I10 ESSENTIAL HYPERTENSION: ICD-10-CM

## 2024-03-12 PROCEDURE — 99212 OFFICE O/P EST SF 10 MIN: CPT

## 2024-03-12 ASSESSMENT — FIBROSIS 4 INDEX: FIB4 SCORE: 1.6

## 2024-03-12 NOTE — PROGRESS NOTES
"   VASCULAR FOLLOW UP VISIT  Subjective:   Juan Luis Rolon is a 64 y.o. female who presents 03/12/24 for vascular follow-up     Subjective     HPI    Carotid disease:  S/p Right ICA PCI, with thrombectomy 8/12/2023 after stroke like symptoms experienced while at home.    Previous history of left carotid occlusion due to presumed dissection  No residual neurologic deficits  No recurrent TIA or stroke symptoms    HTN:  No interval concerns, tolerating meds, no sx.    Home BP log: Mostly 110s-120s/60s-70s -  Adherence to current HTN meds: compliant all of the time   No change to her medications  Denies interfering substances    Antithrombotic therapy:   Was started on brilinta s/p thrombectomy and angioplasty of ICA  Developed SOB, and changed over to ASA with Eliquis  As previous intolerance to clopidogrel  Tolerating no bleeding, SOB resolved     Hyperlipidemia:   Tolerating statin without myalgias    Social History     Tobacco Use    Smoking status: Never    Smokeless tobacco: Never   Vaping Use    Vaping Use: Never used   Substance Use Topics    Alcohol use: Yes     Alcohol/week: 3.5 oz     Types: 7 Glasses of wine per week     Comment: 2 per day    Drug use: No     DIET AND EXERCISE:  Weight Change: down about 5 pounds and maintained  Diet: common adult  Exercise: moderate regular exercise program-mostly cross Fit         Objective     Vitals:    03/12/24 1401 03/12/24 1404   BP: (!) 147/81 119/77   BP Location: Left arm Left arm   Patient Position: Sitting Sitting   BP Cuff Size: Large adult Large adult   Pulse: 83 80   Weight: 76.2 kg (168 lb)    Height: 1.702 m (5' 7\")         BP Readings from Last 5 Encounters:   03/12/24 119/77   09/27/23 126/86   09/05/23 138/84   08/15/23 (!) 142/89   07/25/23 132/78      Body mass index is 26.31 kg/m².  Physical Exam  Vitals reviewed.   Constitutional:       General: She is not in acute distress.     Appearance: She is well-developed. She is not diaphoretic.   HENT:      " "Head: Normocephalic and atraumatic.   Eyes:      General: No scleral icterus.     Conjunctiva/sclera: Conjunctivae normal.      Pupils: Pupils are equal, round, and reactive to light.   Neck:      Thyroid: No thyromegaly.      Vascular: No carotid bruit or JVD.   Cardiovascular:      Rate and Rhythm: Normal rate and regular rhythm.      Heart sounds: Normal heart sounds. No murmur heard.  Pulmonary:      Effort: Pulmonary effort is normal. No respiratory distress.      Breath sounds: Normal breath sounds. No wheezing or rales.   Musculoskeletal:         General: Normal range of motion.      Right lower leg: No edema.      Left lower leg: No edema.   Skin:     General: Skin is warm and dry.      Coloration: Skin is not pale.   Neurological:      General: No focal deficit present.      Mental Status: She is alert and oriented to person, place, and time.      Cranial Nerves: No cranial nerve deficit.      Coordination: Coordination normal.      Gait: Gait normal.      Deep Tendon Reflexes: Reflexes are normal and symmetric.   Psychiatric:         Mood and Affect: Mood normal.         Behavior: Behavior normal.       DATA REVIEW    Labwork 6-24-14: LDL-C, 86, tsh 2.27, urine for ma 0.5, GFR > 60, HDL 73, trig 127    Labwork 6-23-15: LDL-C, 97, HDL-C, 79, Trig- 242, Non HDL-145, TSH-1.92, Na-128, K-4.2, Bun/Cr, 15/0.8, ALT 29, AST 41, Microalbumin-1.4, Urine Creatinine, 37.4          No results found for: \"CHOLSTRLTOT\", \"LDL\", \"HDL\", \"TRIGLYCERIDE\"    Lab Results   Component Value Date/Time    SODIUM 139 08/14/2023 04:35 AM    POTASSIUM 4.2 08/14/2023 04:35 AM    CHLORIDE 107 08/14/2023 04:35 AM    CO2 23 08/14/2023 04:35 AM    GLUCOSE 109 (H) 08/14/2023 04:35 AM    BUN 6 (L) 08/14/2023 04:35 AM    CREATININE 0.70 08/14/2023 04:35 AM     Lab Results   Component Value Date/Time    ALKPHOSPHAT 54 08/13/2023 03:20 AM    ASTSGOT 30 08/13/2023 03:20 AM    ALTSGPT 24 08/13/2023 03:20 AM    TBILIRUBIN 0.6 08/13/2023 03:20 AM    " "   No results found for: \"LIPOPROTA\"   No results found for: \"APOB\"    Lab Results   Component Value Date    HBA1C 5.8 (H) 08/12/2023    No results found for: \"MICROALBCALC\", \"MALBCRT\", \"MALBEXCR\", \"QXGFIH24\", \"MICROALBUR\", \"MICRALB\", \"UMICROALBUM\", \"MICROALBTIM\"     Blood Work 10/9/19:  Na -133, K+ 4.5, cret-0.76, bs-114, ALT 25, AST32, GFR >90  No lipid's done   Lab work 3/3/20:  Chol-T 192, trig-98, HDL-62 WCT297, Non-HDL-130    Blood work 7/13/0202  Na 138, K+ 4.1 cret 0.76, , ast 21, alt 21, GFR 84, CHO-Y 223, Trig 197, HDL 67,  Non     Blood work 10/15/2020 ([facility])  TC: 170, Trig 99, LDL 92, nonHDL 112    [facility] 3/17/22         Labs [facility] 8/2023  Total cholest 123, TG 75, HDL 60, LDL 48, non-HDL 63,     Lab Results   Component Value Date    SODIUM 139 08/14/2023    POTASSIUM 4.2 08/14/2023    CHLORIDE 107 08/14/2023    CO2 23 08/14/2023    GLUCOSE 109 (H) 08/14/2023    BUN 6 (L) 08/14/2023    CREATININE 0.70 08/14/2023        Lab Results   Component Value Date    WBC 5.0 08/14/2023    RBC 3.71 (L) 08/14/2023    HEMOGLOBIN 12.0 08/14/2023    HEMATOCRIT 34.8 (L) 08/14/2023    MCV 93.8 08/14/2023    MCH 32.3 08/14/2023    MCHC 34.5 08/14/2023    MPV 9.0 08/14/2023      IMAGING:     Carotid Duplex 12-5-2014  1. Chronic occlusion of the left internal carotid artery at its origin. Patent left vertebral artery.  2. Minimal heterogeneous plaque in the right carotid bulb and proximal right internal carotid artery. No right internal carotid artery rate limiting stenosis.    Carotid duplex 8-  CONCLUSIONS  1. Normal right carotid duplex examination.   2. Left internal carotid occlusion.   3. Normal bilateral subclavian and vertebral arterial exam.    CTA Oct 2015:   1. Left internal carotid artery occlusion  2. CTA [place] otherwise within normal limits  3. Probable left deep gray matter venous angioma, incidental  4. Underlying white matter changes are likely from " previous small vessel ischemia/infarct    Blood work Oct 2015  LDL 91, nonHdl 143, gfr 74, na 134    CTA October 2016  Persistent occlusion left internal carotid artery  Inferior left basal ganglia and medial left temporal lobe venous angioma  Small vessel disease    Blood work September 2016-sodium 136, potassium 4.6, ALT 19, AST 26, total social to 24, temperature it's 237, HDL 70, , TSH 1.47, free T4 0.89    Carotid Duplex 10/16/17:   Occlusion of the left internal carotid artery.   No significant stenosis of the right carotid or bilateral subclavian or    bilateral vertebral arteries.    Blood work nov 2017 [facility]:  GFR 57, , nonHDL 133, tsh 1.65    Blood work 10/12/2018: NonHDL-139, TG-219, LDL-95, Gluc 93, GFR>90    Blood work 5/8/19:  GFR-80, cmp WNL, NonHDL-125, LDL-93,     Carotid duplex oct 2019:   Normal right carotid exam.    Occlusion of the left internal carotid.    Compared to the prior study of 10/3/18 - there has been no significant    change    Carotid 10/2021     The bilateral subclavian and vertebral artery waveforms are antegrade and    normal in character and velocity.   Known occlusion of the LEFT internal carotid artery.     Normal right carotid exam.     EKG 1/3/2023- in office.  Sinus corby (HR 58), probable L atrial enlargement, probable anterior infarct, old    Echo 1/2023  CONCLUSIONS  No prior study is available for comparison.   Normal left ventricular systolic function.   No evidence of valvular abnormality based on Doppler evaluation.   Right ventricular systolic pressure is estimated to be 35 mmHg.    ELIANE June 2023   normal LV function.  Watchman device in place in the L atrial   appendage.  No thrombus is present on the Watchman device.      ELIANE July 2023  Compared to the images of the prior study on 6/23/2023, no significant   changes.  Normal left ventricular size and systolic function.  Well-seated Watchman device with peridevice leak measuring 2.8  mm.    CTA head August 2023  RIGHT M1 occlusion     CTA neck August 2023  1.  Occlusion or severe occlusion of the RIGHT internal carotid artery.  2.  Occlusion of the LEFT internal carotid artery.    Neuro IR procedure August 2023  1.  Severe stenosis of cavernous segment of the right internal carotid artery likely representing acute plaque rupture.  2.  Successful angioplasty of the atherosclerotic stenosis with moderate improvement.  3.  Residual thrombus and occlusion in the right M2 segment  4.  Nonflow limiting residual thrombus in the distal right A1 segment.    Echo August 2023  Prior study on 07/25/2023, compared to the report of the prior study,   there has been no significant change.   Limited echocardiogram.  Normal left ventricular systolic function.  The left ventricular ejection fraction is visually estimated to be 55%.  No evidence of right to left shunt by agitated saline study.  A definite cardiac source for a systemic thromboembolic event is not   identified, failure to do so does not exclude its presence. If   clinically indicated, a transesophageal study would be useful.     MR brain August 2023  Acute infarcts involving the right inferolateral frontal region, right basal ganglia and right frontal parietal cortex and subcortical region. Additional small scattered areas of acute infarction are noted in the left frontoparietal deep white matter.  Age-related volume loss and chronic microvascular ischemic changes.    Carotid duplex September 2023   Mild stenosis of the right internal carotid artery (<50%).    Occlusion of the left internal carotid artery.    Compared to the images of the prior study on 10/1/2021 - there has been no    significant change.     CTA neck march 2024  1.  Recanalization of the right internal carotid artery which is now opacified.  2.  Occluded left internal carotid artery similar to previous.  3.  Vertebral arteries are opacified and patent.  4.  Maxillary and ethmoid  sinus mucosal thickening consistent with inflammatory changes.           Medical Decision Making:  Today's Assessment / Status / Plan:     1. Essential hypertension        2. Carotid artery dissection (HCC)        3. Dyslipidemia        4. Atrial fibrillation, unspecified type (HCC)        5. Elevated lipoprotein(a)            Patient Type: Secondary Prevention    Etiology of Established CVD if Present:     1.  Complex cerebrovascular disease with distant history of left carotid occlusion presumed to be due to dissection with distal right carotid occlusion due to suspected ruptured atherosclerotic plaque (although hard to rule out underlying dissection) with multifocal stroke in August 2023 status post thrombectomy and angioplasty.   Although never has had distinct radiologic features, I think is likely there is a component of FMD in addition to atherosclerosis  No residual  No recurrent TIA or stroke symptoms  Previous workup for fibromuscular disease in other vascular beds or vasculitis was unremarkable, but this was a number of years ago we may want to reher renal arteries  Plan:  -Continue medical management and lifestyle changes per below  -Repeat CTA head and neck in March 2025  -911 with any recurrent TIA or stroke symptoms    2.  Atrial fibrillation status post ablation and Watchman in 2020 -defer management of rhythm and rate to cardiology    Lipid Management:   High lipoprotein a increases risk of recurrence  Goal LDL less than 55 non-HDL less than 85  Excellent control on recent blood work  Plan:  -Continue rosuvastatin 20 mg daily  -Recheck fast lipid panel and lipoprotein a prior to next visit    Blood Pressure Management:  ACC/AHA BP goal <130/80  Has history of whitecoat hypertension  Good control on previous abpm  Reasonable control in the office today  Excellent control at home may be even beginning to be a bit overtreated  No secondary cause identified  Did not tolerate Diovan due to fogginess and  dizziness - tolerates low dose losartan  Had leg swelling on higher doses of Norvasc  Dizziness with position changes frequently problematic in the past, not bad now  Would like to avoid over treatment, important to maintain adequate cerebral perfusion.    Plan:  - Continue atenolol 50mg daily- for now; defer to cardiology   - Continue amlodipine 2.5mg daily  - Continue spironolactone/ hydrochlorothiazide one half pill daily  - Continue terazosin 1mg qhs for now, but consider stopping if blood pressures remain low  - Continue losartan 25mg daily  -Continue home blood pressure monitoring  -Call if additional lightheadedness or low blood pressures at home  -consider repeat CTA or MRA renal arteries in future to r/o fmd - negative scan in the past, but it has been many years.    Glycemic Status: History of IFG  -Continue lifestyle modification  -Recheck fasting glucose and A1c in future    Anti-Platelet/Anti-Coagulant Tx: yes  As we discussed, difficult to determine appropriate combination of anticoagulant and antiplatelet drugs in the setting  Has had shortness of breath on Brilinta  Did not tolerate clopidogrel in the past  Tolerating current therapy without bleeding  - Continue Eliquis 5mg BID  -Continue concomitant low-dose aspirin 81 mg a day until at least next follow-up visit  - Report any bleeding immediately     LIFESTYLE INTERVENTIONS:    SMOKING:   reports that she has never smoked. She has never used smokeless tobacco.   - continued complete avoidance of all tobacco products     PHYSICAL ACTIVITY: Continue regular CrossFit    WEIGHT MANAGEMENT AND NUTRITION: Dietary plan was discussed with patient at this visit including [place] diet.  Maintain weight loss        Instructed to follow-up with PCP for remainder of adult medical needs: yes  We will partner with other providers in the management of established vascular disease and cardiometabolic risk factors.     Studies to Be Obtained: CTA head and neck  March 2025  Labs to Be Obtained: None     Follow up in: 3 months    Michael J Bloch, M.D.  [facility]   [facility]   [phone]     Cc:  PRINCESS Samuels

## 2024-03-30 DIAGNOSIS — I10 ESSENTIAL HYPERTENSION: ICD-10-CM

## 2024-04-01 RX ORDER — TERAZOSIN 1 MG/1
CAPSULE ORAL
Qty: 90 CAPSULE | Refills: 3 | Status: SHIPPED | OUTPATIENT
Start: 2024-04-01

## 2024-04-01 RX ORDER — LOSARTAN POTASSIUM 25 MG/1
TABLET ORAL
Qty: 90 TABLET | Refills: 3 | Status: SHIPPED | OUTPATIENT
Start: 2024-04-01

## 2024-04-17 DIAGNOSIS — I10 ESSENTIAL HYPERTENSION: ICD-10-CM

## 2024-04-17 RX ORDER — ATENOLOL 50 MG/1
50 TABLET ORAL DAILY
Qty: 90 TABLET | Refills: 3 | Status: SHIPPED | OUTPATIENT
Start: 2024-04-17

## 2024-04-17 NOTE — TELEPHONE ENCOUNTER
Received request via: Pharmacy    Was the patient seen in the last year in this department? Yes    Does the patient have an active prescription (recently filled or refills available) for medication(s) requested? No    [facility]    Does the patient have group home Plus and need 100 day supply (blood pressure, diabetes and cholesterol meds only)? Patient does not have SCP

## 2024-06-11 ENCOUNTER — TELEPHONE (OUTPATIENT)
Dept: VASCULAR LAB | Facility: MEDICAL CENTER | Age: 64
End: 2024-06-11
Payer: MEDICARE

## 2024-06-11 NOTE — TELEPHONE ENCOUNTER
Established patient  Chart prep for upcoming appointment.    Any pending/incomplete orders from last visit? Yes Labs  Was patient called and reminded to complete pending orders? Yes FAXED LAB ORDERS TO [facility] [phone]  Were any records requested?  No    Referral up to date? Yes  Referral attached to appointment (renewals and New patients only)? N/A (established with up-to-date referral)  Virtual appointment? No    Anh Dean, Med Ass't  [facility]  Ph. [phone]  Fx. [phone]

## 2024-06-18 ENCOUNTER — OFFICE VISIT (OUTPATIENT)
Dept: VASCULAR LAB | Facility: MEDICAL CENTER | Age: 64
End: 2024-06-18
Attending: INTERNAL MEDICINE
Payer: MEDICARE

## 2024-06-18 VITALS
DIASTOLIC BLOOD PRESSURE: 87 MMHG | WEIGHT: 165 LBS | BODY MASS INDEX: 26.52 KG/M2 | SYSTOLIC BLOOD PRESSURE: 138 MMHG | HEIGHT: 66 IN | HEART RATE: 73 BPM

## 2024-06-18 DIAGNOSIS — I10 ESSENTIAL HYPERTENSION: ICD-10-CM

## 2024-06-18 DIAGNOSIS — I48.91 ATRIAL FIBRILLATION, UNSPECIFIED TYPE (HCC): ICD-10-CM

## 2024-06-18 DIAGNOSIS — I77.3 FIBROMUSCULAR DYSPLASIA (HCC): ICD-10-CM

## 2024-06-18 DIAGNOSIS — I48.0 PAROXYSMAL ATRIAL FIBRILLATION (HCC): ICD-10-CM

## 2024-06-18 DIAGNOSIS — I65.22 CAROTID OCCLUSION, LEFT: ICD-10-CM

## 2024-06-18 DIAGNOSIS — E78.5 DYSLIPIDEMIA: ICD-10-CM

## 2024-06-18 DIAGNOSIS — I77.71 CAROTID ARTERY DISSECTION (HCC): ICD-10-CM

## 2024-06-18 DIAGNOSIS — I10 WHITE COAT SYNDROME WITH DIAGNOSIS OF HYPERTENSION: ICD-10-CM

## 2024-06-18 PROCEDURE — G2211 COMPLEX E/M VISIT ADD ON: HCPCS | Performed by: INTERNAL MEDICINE

## 2024-06-18 PROCEDURE — 3079F DIAST BP 80-89 MM HG: CPT | Performed by: INTERNAL MEDICINE

## 2024-06-18 PROCEDURE — 99212 OFFICE O/P EST SF 10 MIN: CPT

## 2024-06-18 PROCEDURE — 3075F SYST BP GE 130 - 139MM HG: CPT | Performed by: INTERNAL MEDICINE

## 2024-06-18 PROCEDURE — 99214 OFFICE O/P EST MOD 30 MIN: CPT | Performed by: INTERNAL MEDICINE

## 2024-06-18 RX ORDER — EZETIMIBE 10 MG/1
10 TABLET ORAL DAILY
Qty: 90 TABLET | Refills: 3 | Status: SHIPPED | OUTPATIENT
Start: 2024-06-18

## 2024-06-18 RX ORDER — EZETIMIBE 10 MG/1
10 TABLET ORAL DAILY
Qty: 30 TABLET | Refills: 11 | Status: SHIPPED | OUTPATIENT
Start: 2024-06-18 | End: 2024-06-18

## 2024-06-18 ASSESSMENT — FIBROSIS 4 INDEX: FIB4 SCORE: 1.6

## 2024-06-18 NOTE — PROGRESS NOTES
"   VASCULAR FOLLOW UP VISIT  Subjective:   Juan Luis Rolon is a 64 y.o. female who presents 06/18/24  for vascular follow-up     Subjective     HPI    Carotid disease:  S/p Right ICA PCI, with thrombectomy 8/12/2023 after stroke like symptoms experienced while at home.    Previous history of left carotid occlusion due to presumed dissection  Perhaps some balance issues as her only residual  No recurrent TIA or stroke symptoms    HTN:  No interval concerns, tolerating meds, no sx.    Home BP log: Mostly 110s-120s/60s-70s -  Adherence to current HTN meds: compliant all of the time   No change to her medications  Denies interfering substances    Antithrombotic therapy:   Was started on brilinta s/p thrombectomy and angioplasty of ICA  Developed SOB, and changed over to ASA with Eliquis  Has previous intolerance to clopidogrel  No bleeding  Does have a lot of bruising  Remains on low-dose aspirin and Eliquis  Is status post Watchman     Hyperlipidemia:   Tolerating statin without myalgias    Social History     Tobacco Use    Smoking status: Never    Smokeless tobacco: Never   Vaping Use    Vaping status: Never Used   Substance Use Topics    Alcohol use: Yes     Alcohol/week: 3.5 oz     Types: 7 Glasses of wine per week     Comment: 2 per day    Drug use: No     DIET AND EXERCISE:  Weight Change: down about 5 pounds and maintained  Diet: common adult  Exercise: moderate regular exercise program-mostly cross Fit         Objective     Vitals:    06/18/24 1519 06/18/24 1523   BP: (!) 152/89 138/87   BP Location: Left arm Left arm   Patient Position: Sitting Sitting   BP Cuff Size: Adult Adult   Pulse: 72 73   Weight: 74.8 kg (165 lb)    Height: 1.676 m (5' 6\")         BP Readings from Last 5 Encounters:   06/18/24 138/87   03/12/24 119/77   09/27/23 126/86   09/05/23 138/84   08/15/23 (!) 142/89      Body mass index is 26.63 kg/m².  Physical Exam  Vitals reviewed.   Constitutional:       General: She is not in acute " "distress.     Appearance: She is well-developed. She is not diaphoretic.   HENT:      Head: Normocephalic and atraumatic.   Eyes:      General: No scleral icterus.     Conjunctiva/sclera: Conjunctivae normal.      Pupils: Pupils are equal, round, and reactive to light.   Neck:      Thyroid: No thyromegaly.      Vascular: No carotid bruit or JVD.   Cardiovascular:      Rate and Rhythm: Normal rate and regular rhythm.      Heart sounds: Normal heart sounds. No murmur heard.  Pulmonary:      Effort: Pulmonary effort is normal. No respiratory distress.      Breath sounds: Normal breath sounds. No wheezing or rales.   Musculoskeletal:         General: Normal range of motion.      Right lower leg: No edema.      Left lower leg: No edema.   Skin:     General: Skin is warm and dry.      Coloration: Skin is not pale.   Neurological:      General: No focal deficit present.      Mental Status: She is alert and oriented to person, place, and time.      Cranial Nerves: No cranial nerve deficit.      Coordination: Coordination normal.      Gait: Gait normal.      Deep Tendon Reflexes: Reflexes are normal and symmetric.   Psychiatric:         Mood and Affect: Mood normal.         Behavior: Behavior normal.       DATA REVIEW    Labwork 6-24-14: LDL-C, 86, tsh 2.27, urine for ma 0.5, GFR > 60, HDL 73, trig 127    Labwork 6-23-15: LDL-C, 97, HDL-C, 79, Trig- 242, Non HDL-145, TSH-1.92, Na-128, K-4.2, Bun/Cr, 15/0.8, ALT 29, AST 41, Microalbumin-1.4, Urine Creatinine, 37.4          No results found for: \"CHOLSTRLTOT\", \"LDL\", \"HDL\", \"TRIGLYCERIDE\"    Lab Results   Component Value Date/Time    SODIUM 139 08/14/2023 04:35 AM    POTASSIUM 4.2 08/14/2023 04:35 AM    CHLORIDE 107 08/14/2023 04:35 AM    CO2 23 08/14/2023 04:35 AM    GLUCOSE 109 (H) 08/14/2023 04:35 AM    BUN 6 (L) 08/14/2023 04:35 AM    CREATININE 0.70 08/14/2023 04:35 AM     Lab Results   Component Value Date/Time    ALKPHOSPHAT 54 08/13/2023 03:20 AM    ASTSGOT 30 " "08/13/2023 03:20 AM    ALTSGPT 24 08/13/2023 03:20 AM    TBILIRUBIN 0.6 08/13/2023 03:20 AM       No results found for: \"LIPOPROTA\"   No results found for: \"APOB\"    Lab Results   Component Value Date    HBA1C 5.8 (H) 08/12/2023    No results found for: \"MICROALBCALC\", \"MALBCRT\", \"MALBEXCR\", \"ERDOHA17\", \"MICROALBUR\", \"MICRALB\", \"UMICROALBUM\", \"MICROALBTIM\"     Blood Work 10/9/19:  Na -133, K+ 4.5, cret-0.76, bs-114, ALT 25, AST32, GFR >90  No lipid's done   Lab work 3/3/20:  Chol-T 192, trig-98, HDL-62 NJL107, Non-HDL-130    Blood work 7/13/0202  Na 138, K+ 4.1 cret 0.76, , ast 21, alt 21, GFR 84, CHO-Y 223, Trig 197, HDL 67,  Non     Blood work 10/15/2020 ([facility])  TC: 170, Trig 99, LDL 92, nonHDL 112    [facility] 3/17/22         Labs [facility] 8/2023  Total cholest 123, TG 75, HDL 60, LDL 48, non-HDL 63,     Blood work January 2024  Lipoprotein a 186 nmol/L    Blood work April 2024  A1c 6.1  Albumin creatinine ratio 237  GFR 90  Glucose 184, triglycerides 113, , LDL 61      Lab Results   Component Value Date    SODIUM 139 08/14/2023    POTASSIUM 4.2 08/14/2023    CHLORIDE 107 08/14/2023    CO2 23 08/14/2023    GLUCOSE 109 (H) 08/14/2023    BUN 6 (L) 08/14/2023    CREATININE 0.70 08/14/2023        Lab Results   Component Value Date    WBC 5.0 08/14/2023    RBC 3.71 (L) 08/14/2023    HEMOGLOBIN 12.0 08/14/2023    HEMATOCRIT 34.8 (L) 08/14/2023    MCV 93.8 08/14/2023    MCH 32.3 08/14/2023    MCHC 34.5 08/14/2023    MPV 9.0 08/14/2023      IMAGING:     Carotid Duplex 12-5-2014  1. Chronic occlusion of the left internal carotid artery at its origin. Patent left vertebral artery.  2. Minimal heterogeneous plaque in the right carotid bulb and proximal right internal carotid artery. No right internal carotid artery rate limiting stenosis.    Carotid duplex 8-  CONCLUSIONS  1. Normal right carotid duplex examination.   2. Left internal carotid occlusion.   3. Normal bilateral " subclavian and vertebral arterial exam.    CTA Oct 2015:   1. Left internal carotid artery occlusion  2. CTA [place] otherwise within normal limits  3. Probable left deep gray matter venous angioma, incidental  4. Underlying white matter changes are likely from previous small vessel ischemia/infarct    Blood work Oct 2015  LDL 91, nonHdl 143, gfr 74, na 134    CTA October 2016  Persistent occlusion left internal carotid artery  Inferior left basal ganglia and medial left temporal lobe venous angioma  Small vessel disease    Blood work September 2016-sodium 136, potassium 4.6, ALT 19, AST 26, total social to 24, temperature it's 237, HDL 70, , TSH 1.47, free T4 0.89    Carotid Duplex 10/16/17:   Occlusion of the left internal carotid artery.   No significant stenosis of the right carotid or bilateral subclavian or    bilateral vertebral arteries.    Blood work nov 2017 [facility]:  GFR 57, , nonHDL 133, tsh 1.65    Blood work 10/12/2018: NonHDL-139, TG-219, LDL-95, Gluc 93, GFR>90    Blood work 5/8/19:  GFR-80, cmp WNL, NonHDL-125, LDL-93,     Carotid duplex oct 2019:   Normal right carotid exam.    Occlusion of the left internal carotid.    Compared to the prior study of 10/3/18 - there has been no significant    change    Carotid 10/2021     The bilateral subclavian and vertebral artery waveforms are antegrade and    normal in character and velocity.   Known occlusion of the LEFT internal carotid artery.     Normal right carotid exam.     EKG 1/3/2023- in office.  Sinus corby (HR 58), probable L atrial enlargement, probable anterior infarct, old    Echo 1/2023  CONCLUSIONS  No prior study is available for comparison.   Normal left ventricular systolic function.   No evidence of valvular abnormality based on Doppler evaluation.   Right ventricular systolic pressure is estimated to be 35 mmHg.    ELIANE June 2023   normal LV function.  Watchman device in place in the L atrial   appendage.   No thrombus is present on the Watchman device.      ELIANE July 2023  Compared to the images of the prior study on 6/23/2023, no significant   changes.  Normal left ventricular size and systolic function.  Well-seated Watchman device with peridevice leak measuring 2.8 mm.    CTA head August 2023  RIGHT M1 occlusion     CTA neck August 2023  1.  Occlusion or severe occlusion of the RIGHT internal carotid artery.  2.  Occlusion of the LEFT internal carotid artery.    Neuro IR procedure August 2023  1.  Severe stenosis of cavernous segment of the right internal carotid artery likely representing acute plaque rupture.  2.  Successful angioplasty of the atherosclerotic stenosis with moderate improvement.  3.  Residual thrombus and occlusion in the right M2 segment  4.  Nonflow limiting residual thrombus in the distal right A1 segment.    Echo August 2023  Prior study on 07/25/2023, compared to the report of the prior study,   there has been no significant change.   Limited echocardiogram.  Normal left ventricular systolic function.  The left ventricular ejection fraction is visually estimated to be 55%.  No evidence of right to left shunt by agitated saline study.  A definite cardiac source for a systemic thromboembolic event is not   identified, failure to do so does not exclude its presence. If   clinically indicated, a transesophageal study would be useful.     MR brain August 2023  Acute infarcts involving the right inferolateral frontal region, right basal ganglia and right frontal parietal cortex and subcortical region. Additional small scattered areas of acute infarction are noted in the left frontoparietal deep white matter.  Age-related volume loss and chronic microvascular ischemic changes.    Carotid duplex September 2023   Mild stenosis of the right internal carotid artery (<50%).    Occlusion of the left internal carotid artery.    Compared to the images of the prior study on 10/1/2021 - there has been no     significant change.     CTA neck march 2024  1.  Recanalization of the right internal carotid artery which is now opacified.  2.  Occluded left internal carotid artery similar to previous.  3.  Vertebral arteries are opacified and patent.  4.  Maxillary and ethmoid sinus mucosal thickening consistent with inflammatory changes.           Medical Decision Making:  Today's Assessment / Status / Plan:     1. Essential hypertension  CT-CTA ABDOMEN WITH & W/O-POST PROCESS    Comp Metabolic Panel    Lipid Profile    MICROALBUMIN CREAT RATIO URINE      2. Carotid artery dissection (HCC)        3. Dyslipidemia  ezetimibe (ZETIA) 10 MG Tab    Comp Metabolic Panel    Lipid Profile    MICROALBUMIN CREAT RATIO URINE    DISCONTINUED: ezetimibe (ZETIA) 10 MG Tab      4. Atrial fibrillation, unspecified type (HCC)        5. Carotid occlusion, left        6. Paroxysmal atrial fibrillation (HCC)        7. Fibromuscular dysplasia (HCC)  CT-CTA ABDOMEN WITH & W/O-POST PROCESS      8. White coat syndrome with diagnosis of hypertension  Referral to 24-Hour Blood Pressure Monitoring          Patient Type: Secondary Prevention    Etiology of Established CVD if Present:     1.  Complex cerebrovascular disease with distant history of left carotid occlusion presumed to be due to dissection with distal right carotid occlusion due to suspected ruptured atherosclerotic plaque (although hard to rule out underlying dissection) with multifocal stroke in August 2023 status post thrombectomy and angioplasty.   Although never has had distinct radiologic features, I think is likely there is a component of FMD in addition to atherosclerosis  No residual  No recurrent TIA or stroke symptoms  Previous workup for fibromuscular disease in other vascular beds or vasculitis was unremarkable, but this was a number of years ago  Plan:  -Continue medical management and lifestyle changes per below  -Repeat CTA head and neck in March 2025  -911 with any recurrent  TIA or stroke symptoms  -Check CTA abdomen to rule out renal or visceral aneurysm    2.  Atrial fibrillation status post ablation and Watchman in 2020 -defer management of rhythm and rate to cardiology  -Antiplatelet/anticoagulation as per below    Lipid Management:   High lipoprotein a (186 nmol/L in January 2024 and 169 nm/liter in 2021) increases risk of recurrence  Goal LDL less than 55 -suboptimal control  Goal APO B less than 60 -unknown control  Plan:  -Continue rosuvastatin 20 mg daily  -Add ezetimibe 10 mg a day  -Recheck fast lipid panel and apolipoprotein B prior to next visit    Blood Pressure Management:  ACC/AHA BP goal <130/80  Has history of whitecoat hypertension  Good control on previous abpm, but has been sometime  Elevated in the office today  Excellent control at home  No secondary cause identified  Did not tolerate Diovan due to fogginess and dizziness - tolerates low dose losartan  Had leg swelling on higher doses of Norvasc  Dizziness with position changes frequently problematic in the past, not bad now  She has done well with low-dose combination therapy although it does increase her pill count  Plan:  - Continue atenolol 50mg daily  - Continue amlodipine 2.5mg daily  - Continue spironolactone/ hydrochlorothiazide one half pill daily  - Continue terazosin 1mg qhs  - Continue losartan 25mg daily  -Continue home blood pressure monitoring  -Repeat CTA renal arteries  -Obtain ABPM.    Glycemic Status: History of IFG  A1c 6.1 -relatively stable  -Continue lifestyle modification  -Recheck fasting glucose and A1c in future    Anti-Platelet/Anti-Coagulant Tx: yes  As we discussed, difficult to determine appropriate combination of anticoagulant and antiplatelet drugs in the setting  Has had shortness of breath on Brilinta  Did not tolerate clopidogrel in the past  Tolerating current therapy without bleeding  - Continue Eliquis 5mg BID  -Continue concomitant low-dose aspirin 81 mg a day until at least  next follow-up visit  - Report any bleeding immediately     LIFESTYLE INTERVENTIONS:    SMOKING:   reports that she has never smoked. She has never used smokeless tobacco.   - continued complete avoidance of all tobacco products     PHYSICAL ACTIVITY: Continue regular CrossFit    WEIGHT MANAGEMENT AND NUTRITION: Dietary plan was discussed with patient at this visit including [place] diet.  Maintain weight loss      Instructed to follow-up with PCP for remainder of adult medical needs: yes  We will partner with other providers in the management of established vascular disease and cardiometabolic risk factors.     Studies to Be Obtained:   1) CTA renal arteries prior to next visit   2) ABPM prior to next visit   3) CTA head and neck March 2025    Labs to Be Obtained: As above prior to next visit     Follow up in: 3 months    Time: 31 min - chart review/prep, review of other providers' records, imaging/lab review, face-to-face time for history/examination, ordering, prescribing,  review of results/meds/ treatment plan with patient/family/caregiver, documentation in EMR, care coordination (as needed)     Michael J Bloch, M.D.  [facility]   [facility]   [phone]     Cc:  PRINCESS Samuels

## 2024-06-19 DIAGNOSIS — I48.91 ATRIAL FIBRILLATION, UNSPECIFIED TYPE (HCC): ICD-10-CM

## 2024-06-20 RX ORDER — APIXABAN 5 MG/1
5 TABLET, FILM COATED ORAL 2 TIMES DAILY
Qty: 180 TABLET | Refills: 1 | Status: SHIPPED | OUTPATIENT
Start: 2024-06-20 | End: 2024-06-24

## 2024-06-21 ENCOUNTER — HOSPITAL ENCOUNTER (OUTPATIENT)
Dept: RADIOLOGY | Facility: MEDICAL CENTER | Age: 64
End: 2024-06-21
Attending: INTERNAL MEDICINE
Payer: MEDICARE

## 2024-06-21 DIAGNOSIS — I48.91 ATRIAL FIBRILLATION, UNSPECIFIED TYPE (HCC): ICD-10-CM

## 2024-06-21 DIAGNOSIS — I77.3 FIBROMUSCULAR DYSPLASIA (HCC): ICD-10-CM

## 2024-06-21 DIAGNOSIS — I10 ESSENTIAL HYPERTENSION: ICD-10-CM

## 2024-06-21 PROCEDURE — 700117 HCHG RX CONTRAST REV CODE 255: Performed by: INTERNAL MEDICINE

## 2024-06-21 PROCEDURE — 74175 CTA ABDOMEN W/CONTRAST: CPT

## 2024-06-21 RX ADMIN — IOHEXOL 100 ML: 350 INJECTION, SOLUTION INTRAVENOUS at 10:45

## 2024-06-24 RX ORDER — APIXABAN 5 MG/1
5 TABLET, FILM COATED ORAL 2 TIMES DAILY
Qty: 180 TABLET | Refills: 3 | Status: SHIPPED | OUTPATIENT
Start: 2024-06-24

## 2024-06-25 ENCOUNTER — TELEPHONE (OUTPATIENT)
Dept: VASCULAR LAB | Facility: MEDICAL CENTER | Age: 64
End: 2024-06-25
Payer: MEDICARE

## 2024-06-25 NOTE — TELEPHONE ENCOUNTER
Called in rx to [facility]. Called patient back to let her know it has been called in.       Samanta Phillip, Medical Assistant   [facility]   Ph: [phone]  Fx: [phone]

## 2024-06-25 NOTE — TELEPHONE ENCOUNTER
Caller: Juan Luis Rolon    Topic/issue: [facility] does not have the script for ezetimibe (ZETIA) 10 MG Tab, please advise.     Callback Number: [phone]    Thank You    Nandini SÁNCHEZ

## 2024-07-29 ENCOUNTER — NON-PROVIDER VISIT (OUTPATIENT)
Dept: VASCULAR LAB | Facility: MEDICAL CENTER | Age: 64
End: 2024-07-29
Attending: INTERNAL MEDICINE
Payer: MEDICARE

## 2024-07-29 PROCEDURE — 93786 AMBL BP MNTR W/SW REC ONLY: CPT

## 2024-07-29 PROCEDURE — 93788 AMBL BP MNTR W/SW A/R: CPT

## 2024-08-28 DIAGNOSIS — I10 ESSENTIAL HYPERTENSION: ICD-10-CM

## 2024-08-29 RX ORDER — SPIRONOLACTONE AND HYDROCHLOROTHIAZIDE 25; 25 MG/1; MG/1
TABLET ORAL
Qty: 90 TABLET | Refills: 3 | Status: SHIPPED | OUTPATIENT
Start: 2024-08-29

## 2024-09-03 DIAGNOSIS — E78.5 DYSLIPIDEMIA: ICD-10-CM

## 2024-09-03 DIAGNOSIS — I10 ESSENTIAL HYPERTENSION: ICD-10-CM

## 2024-09-27 ENCOUNTER — OFFICE VISIT (OUTPATIENT)
Dept: VASCULAR LAB | Facility: MEDICAL CENTER | Age: 64
End: 2024-09-27
Attending: INTERNAL MEDICINE
Payer: MEDICARE

## 2024-09-27 VITALS
SYSTOLIC BLOOD PRESSURE: 119 MMHG | HEART RATE: 79 BPM | DIASTOLIC BLOOD PRESSURE: 77 MMHG | HEIGHT: 66 IN | WEIGHT: 165 LBS | BODY MASS INDEX: 26.52 KG/M2

## 2024-09-27 DIAGNOSIS — I10 ESSENTIAL HYPERTENSION: ICD-10-CM

## 2024-09-27 DIAGNOSIS — I48.91 ATRIAL FIBRILLATION, UNSPECIFIED TYPE (HCC): ICD-10-CM

## 2024-09-27 DIAGNOSIS — I77.3 FIBROMUSCULAR DYSPLASIA (HCC): ICD-10-CM

## 2024-09-27 DIAGNOSIS — I77.71 CAROTID ARTERY DISSECTION (HCC): ICD-10-CM

## 2024-09-27 DIAGNOSIS — I48.0 PAROXYSMAL ATRIAL FIBRILLATION (HCC): ICD-10-CM

## 2024-09-27 DIAGNOSIS — E78.41 ELEVATED LIPOPROTEIN(A): ICD-10-CM

## 2024-09-27 DIAGNOSIS — E78.5 DYSLIPIDEMIA: ICD-10-CM

## 2024-09-27 PROCEDURE — 99212 OFFICE O/P EST SF 10 MIN: CPT

## 2024-09-27 ASSESSMENT — FIBROSIS 4 INDEX: FIB4 SCORE: 1.6

## 2024-09-27 NOTE — PROGRESS NOTES
"   VASCULAR FOLLOW UP VISIT  Subjective:   Juan Luis Rolon is a 64 y.o. female who presents 09/27/24  for vascular follow-up     Subjective     HPI    Carotid disease:  S/p Right ICA PCI, with thrombectomy 8/12/2023 after stroke like symptoms experienced while at home.    Previous history of left carotid occlusion due to presumed dissection  Perhaps some balance issues as her only residual  No recurrent TIA or stroke symptoms    HTN:  No interval concerns, tolerating meds, no sx.    Home BP log: Mostly <130/80  Adherence to current HTN meds: compliant all of the time   No change to her medications  Denies interfering substances  Had abpm     Antithrombotic therapy:   Was started on brilinta s/p thrombectomy and angioplasty of ICA  Developed SOB, and changed over to ASA with Eliquis  Has previous intolerance to clopidogrel  No bleeding  Does have a lot of bruising  Remains on low-dose aspirin and Eliquis  Is status post Watchman     Hyperlipidemia:   Tolerating statin without myalgias  Tolerating addition of ezetimibe  Had blood work done    Social History     Tobacco Use    Smoking status: Never    Smokeless tobacco: Never   Vaping Use    Vaping status: Never Used   Substance Use Topics    Alcohol use: Yes     Alcohol/week: 3.5 oz     Types: 7 Glasses of wine per week     Comment: 2 per day    Drug use: No     DIET AND EXERCISE:  Weight Change: down about 5 pounds and maintained  Diet: common adult  Exercise: moderate regular exercise program-mostly cross Fit - less able to do so since shoulder injury         Objective     Vitals:    09/27/24 0956   BP: 119/77   BP Location: Left arm   Patient Position: Sitting   BP Cuff Size: Adult   Pulse: 79   Weight: 74.8 kg (165 lb)   Height: 1.676 m (5' 6\")        BP Readings from Last 5 Encounters:   09/27/24 119/77   06/18/24 138/87   03/12/24 119/77   09/27/23 126/86   09/05/23 138/84      Body mass index is 26.63 kg/m².  Physical Exam  Vitals reviewed.   Constitutional:  " "     General: She is not in acute distress.     Appearance: She is well-developed. She is not diaphoretic.   HENT:      Head: Normocephalic and atraumatic.   Eyes:      General: No scleral icterus.     Conjunctiva/sclera: Conjunctivae normal.      Pupils: Pupils are equal, round, and reactive to light.   Neck:      Thyroid: No thyromegaly.      Vascular: No carotid bruit or JVD.   Cardiovascular:      Rate and Rhythm: Normal rate and regular rhythm.      Heart sounds: Normal heart sounds. No murmur heard.  Pulmonary:      Effort: Pulmonary effort is normal. No respiratory distress.      Breath sounds: Normal breath sounds. No wheezing or rales.   Musculoskeletal:         General: Normal range of motion.      Right lower leg: No edema.      Left lower leg: No edema.   Skin:     General: Skin is warm and dry.      Coloration: Skin is not pale.   Neurological:      General: No focal deficit present.      Mental Status: She is alert and oriented to person, place, and time.      Cranial Nerves: No cranial nerve deficit.      Coordination: Coordination normal.      Gait: Gait normal.      Deep Tendon Reflexes: Reflexes are normal and symmetric.   Psychiatric:         Mood and Affect: Mood normal.         Behavior: Behavior normal.       DATA REVIEW    Labwork 6-24-14: LDL-C, 86, tsh 2.27, urine for ma 0.5, GFR > 60, HDL 73, trig 127    Labwork 6-23-15: LDL-C, 97, HDL-C, 79, Trig- 242, Non HDL-145, TSH-1.92, Na-128, K-4.2, Bun/Cr, 15/0.8, ALT 29, AST 41, Microalbumin-1.4, Urine Creatinine, 37.4          No results found for: \"CHOLSTRLTOT\", \"LDL\", \"HDL\", \"TRIGLYCERIDE\"    Lab Results   Component Value Date/Time    SODIUM 139 08/14/2023 04:35 AM    POTASSIUM 4.2 08/14/2023 04:35 AM    CHLORIDE 107 08/14/2023 04:35 AM    CO2 23 08/14/2023 04:35 AM    GLUCOSE 109 (H) 08/14/2023 04:35 AM    BUN 6 (L) 08/14/2023 04:35 AM    CREATININE 0.70 08/14/2023 04:35 AM     Lab Results   Component Value Date/Time    ALKPHOSPHAT 54 " "08/13/2023 03:20 AM    ASTSGOT 30 08/13/2023 03:20 AM    ALTSGPT 24 08/13/2023 03:20 AM    TBILIRUBIN 0.6 08/13/2023 03:20 AM       No results found for: \"LIPOPROTA\"   No results found for: \"APOB\"    Lab Results   Component Value Date    HBA1C 5.8 (H) 08/12/2023    No results found for: \"MICROALBCALC\", \"MALBCRT\", \"MALBEXCR\", \"ODPWPF07\", \"MICROALBUR\", \"MICRALB\", \"UMICROALBUM\", \"MICROALBTIM\"     Blood Work 10/9/19:  Na -133, K+ 4.5, cret-0.76, bs-114, ALT 25, AST32, GFR >90  No lipid's done   Lab work 3/3/20:  Chol-T 192, trig-98, HDL-62 PPA223, Non-HDL-130    Blood work 7/13/0202  Na 138, K+ 4.1 cret 0.76, , ast 21, alt 21, GFR 84, CHO-Y 223, Trig 197, HDL 67,  Non     Blood work 10/15/2020 ([facility])  TC: 170, Trig 99, LDL 92, nonHDL 112    [facility] 3/17/22         Labs [facility] 8/2023  Total cholest 123, TG 75, HDL 60, LDL 48, non-HDL 63,     Blood work January 2024  Lipoprotein a 186 nmol/L    Blood work April 2024  A1c 6.1  Albumin creatinine ratio 237  GFR 90  Glucose 184, triglycerides 113, , LDL 61    Blood work from [facility] Aug 2024  Na 132, k 4, gfr >90  , tirg 105, HDL 93, LDL 29, nonHDL 60  Alb/creat ratio in urine 37      Lab Results   Component Value Date    SODIUM 139 08/14/2023    POTASSIUM 4.2 08/14/2023    CHLORIDE 107 08/14/2023    CO2 23 08/14/2023    GLUCOSE 109 (H) 08/14/2023    BUN 6 (L) 08/14/2023    CREATININE 0.70 08/14/2023        Lab Results   Component Value Date    WBC 5.0 08/14/2023    RBC 3.71 (L) 08/14/2023    HEMOGLOBIN 12.0 08/14/2023    HEMATOCRIT 34.8 (L) 08/14/2023    MCV 93.8 08/14/2023    MCH 32.3 08/14/2023    MCHC 34.5 08/14/2023    MPV 9.0 08/14/2023      IMAGING:     Carotid Duplex 12-5-2014  1. Chronic occlusion of the left internal carotid artery at its origin. Patent left vertebral artery.  2. Minimal heterogeneous plaque in the right carotid bulb and proximal right internal carotid artery. No right internal carotid artery " rate limiting stenosis.    Carotid duplex 8-  CONCLUSIONS  1. Normal right carotid duplex examination.   2. Left internal carotid occlusion.   3. Normal bilateral subclavian and vertebral arterial exam.    CTA Oct 2015:   1. Left internal carotid artery occlusion  2. CTA [place] otherwise within normal limits  3. Probable left deep gray matter venous angioma, incidental  4. Underlying white matter changes are likely from previous small vessel ischemia/infarct    Blood work Oct 2015  LDL 91, nonHdl 143, gfr 74, na 134    CTA October 2016  Persistent occlusion left internal carotid artery  Inferior left basal ganglia and medial left temporal lobe venous angioma  Small vessel disease    Blood work September 2016-sodium 136, potassium 4.6, ALT 19, AST 26, total social to 24, temperature it's 237, HDL 70, , TSH 1.47, free T4 0.89    Carotid Duplex 10/16/17:   Occlusion of the left internal carotid artery.   No significant stenosis of the right carotid or bilateral subclavian or    bilateral vertebral arteries.    Blood work nov 2017 [facility]:  GFR 57, , nonHDL 133, tsh 1.65    Blood work 10/12/2018: NonHDL-139, TG-219, LDL-95, Gluc 93, GFR>90    Blood work 5/8/19:  GFR-80, cmp WNL, NonHDL-125, LDL-93,     Carotid duplex oct 2019:   Normal right carotid exam.    Occlusion of the left internal carotid.    Compared to the prior study of 10/3/18 - there has been no significant    change    Carotid 10/2021     The bilateral subclavian and vertebral artery waveforms are antegrade and    normal in character and velocity.   Known occlusion of the LEFT internal carotid artery.     Normal right carotid exam.     EKG 1/3/2023- in office.  Sinus corby (HR 58), probable L atrial enlargement, probable anterior infarct, old    Echo 1/2023  CONCLUSIONS  No prior study is available for comparison.   Normal left ventricular systolic function.   No evidence of valvular abnormality based on Doppler  evaluation.   Right ventricular systolic pressure is estimated to be 35 mmHg.    ELIANE June 2023   normal LV function.  Watchman device in place in the L atrial   appendage.  No thrombus is present on the Watchman device.      ELIANE July 2023  Compared to the images of the prior study on 6/23/2023, no significant   changes.  Normal left ventricular size and systolic function.  Well-seated Watchman device with peridevice leak measuring 2.8 mm.    CTA head August 2023  RIGHT M1 occlusion     CTA neck August 2023  1.  Occlusion or severe occlusion of the RIGHT internal carotid artery.  2.  Occlusion of the LEFT internal carotid artery.    Neuro IR procedure August 2023  1.  Severe stenosis of cavernous segment of the right internal carotid artery likely representing acute plaque rupture.  2.  Successful angioplasty of the atherosclerotic stenosis with moderate improvement.  3.  Residual thrombus and occlusion in the right M2 segment  4.  Nonflow limiting residual thrombus in the distal right A1 segment.    Echo August 2023  Prior study on 07/25/2023, compared to the report of the prior study,   there has been no significant change.   Limited echocardiogram.  Normal left ventricular systolic function.  The left ventricular ejection fraction is visually estimated to be 55%.  No evidence of right to left shunt by agitated saline study.  A definite cardiac source for a systemic thromboembolic event is not   identified, failure to do so does not exclude its presence. If   clinically indicated, a transesophageal study would be useful.     MR brain August 2023  Acute infarcts involving the right inferolateral frontal region, right basal ganglia and right frontal parietal cortex and subcortical region. Additional small scattered areas of acute infarction are noted in the left frontoparietal deep white matter.  Age-related volume loss and chronic microvascular ischemic changes.    Carotid duplex September 2023   Mild stenosis of  the right internal carotid artery (<50%).    Occlusion of the left internal carotid artery.    Compared to the images of the prior study on 10/1/2021 - there has been no    significant change.     CTA neck march 2024  1.  Recanalization of the right internal carotid artery which is now opacified.  2.  Occluded left internal carotid artery similar to previous.  3.  Vertebral arteries are opacified and patent.  4.  Maxillary and ethmoid sinus mucosal thickening consistent with inflammatory changes.    CTA abdomen june 2024  1.  No abdominal aortic aneurysm or dissection.  2.  No renal artery or visceral artery aneurysm.  3.  Diverticula colon without evidence of diverticulitis. No free fluid.  4.  Radiopaque material within the appendix with no evidence of appendicolith. No evidence of acute appendicitis.           Medical Decision Making:  Today's Assessment / Status / Plan:     1. Essential hypertension  Lipid Profile    Comp Metabolic Panel      2. Dyslipidemia  Lipid Profile    Comp Metabolic Panel      3. Atrial fibrillation, unspecified type (HCC)        4. Carotid artery dissection (HCC)  CT-CTA NECK WITH & W/O-POST PROCESSING    CT-CTA HEAD WITH & W/O-POST PROCESS      5. Paroxysmal atrial fibrillation (HCC)        6. Fibromuscular dysplasia (HCC)  CT-CTA NECK WITH & W/O-POST PROCESSING    CT-CTA HEAD WITH & W/O-POST PROCESS      7. Elevated lipoprotein(a)            Patient Type: Secondary Prevention    Etiology of Established CVD if Present:     1.  Complex cerebrovascular disease with distant history of left carotid occlusion presumed to be due to dissection with distal right carotid occlusion due to suspected ruptured atherosclerotic plaque (although hard to rule out underlying dissection) with multifocal stroke in August 2023 status post thrombectomy and angioplasty.   Although never has had distinct radiologic features, I think is likely there is a component of FMD in addition to atherosclerosis  No  residual  No recurrent TIA or stroke symptoms  Previous workup for fibromuscular disease in other vascular beds or vasculitis was unremarkable, including CTA abd in 2024  Plan:  -Continue medical management and lifestyle changes per below  -Repeat CTA head and neck in March 2025  -911 with any recurrent TIA or stroke symptoms    2.  Atrial fibrillation status post ablation and Watchman in 2020 -defer management of rhythm and rate to cardiology  -Antiplatelet/anticoagulation as per below    Lipid Management:   High lipoprotein a (186 nmol/L in January 2024 and 169 nm/liter in 2021) increases risk of recurrence  Goal LDL less than 55 -good control with addition of ned  Goal nonHDL <85 - good control  Plan:  -Continue rosuvastatin 20 mg daily  -continue  ezetimibe 10 mg a day  -Recheck fast lipid panel prior to next visit    Blood Pressure Management:  ACC/AHA BP goal <130/80  Has history of whitecoat hypertension  Good control on previous abpm - Had repeat in july 2024 but no results available - will ask MA to obtain  Good control at home  Good control in office  No secondary cause identified  Did not tolerate Diovan due to fogginess and dizziness - tolerates low dose losartan  Had leg swelling on higher doses of Norvasc  Dizziness with position changes frequently problematic in the past, not bad now  She has done well with low-dose combination therapy although it does increase her pill count  No significant albuminuria in 2024  Plan:  - Continue atenolol 50mg daily  - Continue amlodipine 2.5mg daily  - Continue spironolactone/ hydrochlorothiazide one half pill daily  - Continue terazosin 1mg qhs  - Continue losartan 25mg daily  -Continue home blood pressure monitoring    Glycemic Status: History of IFG  A1c 6.1 -relatively stable  -Continue lifestyle modification  -Recheck fasting glucose and A1c in future    Anti-Platelet/Anti-Coagulant Tx: yes  As we discussed, difficult to determine appropriate combination of  anticoagulant and antiplatelet drugs in the setting  Has had shortness of breath on Brilinta  Did not tolerate clopidogrel in the past  Tolerating current therapy without bleeding  - Continue Eliquis 5mg BID  -Continue concomitant low-dose aspirin 81 mg a day until at least next follow-up visit  - Report any bleeding immediately     LIFESTYLE INTERVENTIONS:    SMOKING:   reports that she has never smoked. She has never used smokeless tobacco.   - continued complete avoidance of all tobacco products     PHYSICAL ACTIVITY: Continue regular CrossFit    WEIGHT MANAGEMENT AND NUTRITION: Dietary plan was discussed with patient at this visit including Mediterrean diet.  Maintain weight loss    Other:    # shoulder injury - she is contemplating shoulder replacement. As we discussed this would necessitate holding anticoagulant/antiplatelet agent. Would be a risk of stroke that is difficult to quantify      Instructed to follow-up with PCP for remainder of adult medical needs: yes  We will partner with other providers in the management of established vascular disease and cardiometabolic risk factors.     Studies to Be Obtained: CTA head and neck March 2025 - ordered  Labs to Be Obtained: As above prior to next visit     Follow up in: 6 months    Time: 31 min - chart review/prep, review of other providers' records, imaging/lab review, face-to-face time for history/examination, ordering, prescribing,  review of results/meds/ treatment plan with patient/family/caregiver, documentation in EMR, care coordination (as needed)     Michael J Bloch, M.D.  [facility]   [facility]   [phone]     Cc:  PRINCESS Samuels

## 2024-12-03 DIAGNOSIS — I10 ESSENTIAL HYPERTENSION: ICD-10-CM

## 2024-12-04 RX ORDER — AMLODIPINE BESYLATE 2.5 MG/1
TABLET ORAL
Qty: 90 TABLET | Refills: 3 | Status: SHIPPED | OUTPATIENT
Start: 2024-12-04

## 2025-01-02 DIAGNOSIS — I63.9 ACUTE ISCHEMIC STROKE (HCC): ICD-10-CM

## 2025-01-02 RX ORDER — ROSUVASTATIN CALCIUM 20 MG/1
20 TABLET, COATED ORAL EVERY EVENING
Qty: 100 TABLET | Refills: 3 | Status: SHIPPED | OUTPATIENT
Start: 2025-01-02

## 2025-01-30 ENCOUNTER — TELEPHONE (OUTPATIENT)
Dept: VASCULAR LAB | Facility: MEDICAL CENTER | Age: 65
End: 2025-01-30
Payer: MEDICARE

## 2025-01-30 NOTE — TELEPHONE ENCOUNTER
Called and left message  reschedule f/u with Dr Michael Bloch on 03/25    We have openings on    March 18 940-noon and 240-4 (main)   March 20 9-12 and 140-440. ([facility])    Please forward call to Anh Dean, Med Ass't to find availability     Anh Dean, Med Ass't  [facility]  Ph. [phone]  Fx. [phone]

## 2025-02-12 DIAGNOSIS — I10 ESSENTIAL HYPERTENSION: ICD-10-CM

## 2025-02-12 RX ORDER — TERAZOSIN 1 MG/1
1 CAPSULE ORAL
Qty: 90 CAPSULE | Refills: 3 | Status: SHIPPED | OUTPATIENT
Start: 2025-02-12

## 2025-02-12 RX ORDER — LOSARTAN POTASSIUM 25 MG/1
25 TABLET ORAL DAILY
Qty: 90 TABLET | Refills: 3 | Status: SHIPPED | OUTPATIENT
Start: 2025-02-12

## 2025-02-26 DIAGNOSIS — I10 ESSENTIAL HYPERTENSION: ICD-10-CM

## 2025-02-27 RX ORDER — ATENOLOL 50 MG/1
50 TABLET ORAL DAILY
Qty: 90 TABLET | Refills: 3 | Status: SHIPPED | OUTPATIENT
Start: 2025-02-27

## 2025-03-04 DIAGNOSIS — E78.5 DYSLIPIDEMIA: ICD-10-CM

## 2025-03-04 DIAGNOSIS — I10 ESSENTIAL HYPERTENSION: ICD-10-CM

## 2025-03-11 ENCOUNTER — TELEPHONE (OUTPATIENT)
Dept: VASCULAR LAB | Facility: MEDICAL CENTER | Age: 65
End: 2025-03-11
Payer: MEDICARE

## 2025-03-11 ENCOUNTER — HOSPITAL ENCOUNTER (OUTPATIENT)
Dept: RADIOLOGY | Facility: MEDICAL CENTER | Age: 65
End: 2025-03-11
Attending: INTERNAL MEDICINE
Payer: MEDICARE

## 2025-03-11 ENCOUNTER — DOCUMENTATION (OUTPATIENT)
Dept: VASCULAR LAB | Facility: MEDICAL CENTER | Age: 65
End: 2025-03-11
Payer: MEDICARE

## 2025-03-11 DIAGNOSIS — I65.21 RIGHT CAVERNOUS CAROTID STENOSIS: ICD-10-CM

## 2025-03-11 DIAGNOSIS — I77.71 CAROTID ARTERY DISSECTION (HCC): ICD-10-CM

## 2025-03-11 DIAGNOSIS — Z79.01 ANTICOAGULATED: ICD-10-CM

## 2025-03-11 DIAGNOSIS — Z79.02 LONG TERM (CURRENT) USE OF ANTITHROMBOTICS/ANTIPLATELETS: ICD-10-CM

## 2025-03-11 DIAGNOSIS — R73.01 IFG (IMPAIRED FASTING GLUCOSE): ICD-10-CM

## 2025-03-11 DIAGNOSIS — I77.3 FIBROMUSCULAR DYSPLASIA (HCC): ICD-10-CM

## 2025-03-11 DIAGNOSIS — I63.9 ACUTE ISCHEMIC STROKE (HCC): ICD-10-CM

## 2025-03-11 DIAGNOSIS — I10 ESSENTIAL HYPERTENSION: ICD-10-CM

## 2025-03-11 DIAGNOSIS — E78.5 DYSLIPIDEMIA: ICD-10-CM

## 2025-03-11 DIAGNOSIS — D68.59 HYPERCOAGULABLE STATE (HCC): ICD-10-CM

## 2025-03-11 DIAGNOSIS — E78.41 ELEVATED LIPOPROTEIN(A): ICD-10-CM

## 2025-03-11 DIAGNOSIS — I48.91 ATRIAL FIBRILLATION, UNSPECIFIED TYPE (HCC): ICD-10-CM

## 2025-03-11 DIAGNOSIS — R73.01 IMPAIRED FASTING GLUCOSE: ICD-10-CM

## 2025-03-11 DIAGNOSIS — Z86.73 HX OF TIA (TRANSIENT ISCHEMIC ATTACK) AND STROKE: ICD-10-CM

## 2025-03-11 DIAGNOSIS — I10 WHITE COAT SYNDROME WITH DIAGNOSIS OF HYPERTENSION: ICD-10-CM

## 2025-03-11 DIAGNOSIS — I48.0 PAROXYSMAL ATRIAL FIBRILLATION (HCC): ICD-10-CM

## 2025-03-11 DIAGNOSIS — I65.22 CAROTID OCCLUSION, LEFT: ICD-10-CM

## 2025-03-11 PROCEDURE — 700117 HCHG RX CONTRAST REV CODE 255: Performed by: INTERNAL MEDICINE

## 2025-03-11 PROCEDURE — 70498 CT ANGIOGRAPHY NECK: CPT

## 2025-03-11 PROCEDURE — 70496 CT ANGIOGRAPHY HEAD: CPT

## 2025-03-11 RX ADMIN — IOHEXOL 100 ML: 350 INJECTION, SOLUTION INTRAVENOUS at 15:03

## 2025-03-11 NOTE — TELEPHONE ENCOUNTER
Established patient  Chart prep for upcoming appointment.    Any pending/incomplete orders from last visit? No, all orders completed.  Was patient called and reminded to complete pending orders? N/A orders complete  Were any records requested?  No    Referral up to date? Yes renewal ordered, sent to provider to co-sign, AND new referral attached to upcoming appointment.    Referral attached to appointment (renewals and New patients only)? Yes renewal ordered and sent to provider to co-sign   Virtual appointment? No    Vasyl Ayoub Ass't  [facility]  Ph. [phone]  Fx. [phone]

## 2025-03-12 NOTE — PROGRESS NOTES
CTA head and neck reviewed.   Will d/w patient at upcoming f/u visit  Assuming no new symptoms will continue with conservative management and repeat CTA head and neck in one year - march 2026    Michael Bloch, MD  [facility]

## 2025-03-18 ENCOUNTER — OFFICE VISIT (OUTPATIENT)
Dept: VASCULAR LAB | Facility: MEDICAL CENTER | Age: 65
End: 2025-03-18
Attending: INTERNAL MEDICINE
Payer: MEDICARE

## 2025-03-18 VITALS
HEART RATE: 73 BPM | SYSTOLIC BLOOD PRESSURE: 133 MMHG | HEIGHT: 66 IN | WEIGHT: 166 LBS | DIASTOLIC BLOOD PRESSURE: 86 MMHG | BODY MASS INDEX: 26.68 KG/M2

## 2025-03-18 DIAGNOSIS — I10 ESSENTIAL HYPERTENSION: ICD-10-CM

## 2025-03-18 DIAGNOSIS — I63.9 ACUTE ISCHEMIC STROKE (HCC): ICD-10-CM

## 2025-03-18 DIAGNOSIS — E78.5 DYSLIPIDEMIA: ICD-10-CM

## 2025-03-18 DIAGNOSIS — D68.59 HYPERCOAGULABLE STATE (HCC): ICD-10-CM

## 2025-03-18 DIAGNOSIS — I48.91 ATRIAL FIBRILLATION, UNSPECIFIED TYPE (HCC): ICD-10-CM

## 2025-03-18 DIAGNOSIS — R73.01 IFG (IMPAIRED FASTING GLUCOSE): ICD-10-CM

## 2025-03-18 PROCEDURE — 3079F DIAST BP 80-89 MM HG: CPT | Performed by: INTERNAL MEDICINE

## 2025-03-18 PROCEDURE — 99214 OFFICE O/P EST MOD 30 MIN: CPT | Performed by: INTERNAL MEDICINE

## 2025-03-18 PROCEDURE — 3075F SYST BP GE 130 - 139MM HG: CPT | Performed by: INTERNAL MEDICINE

## 2025-03-18 PROCEDURE — G2211 COMPLEX E/M VISIT ADD ON: HCPCS | Performed by: INTERNAL MEDICINE

## 2025-03-18 PROCEDURE — 99212 OFFICE O/P EST SF 10 MIN: CPT

## 2025-03-18 ASSESSMENT — FIBROSIS 4 INDEX: FIB4 SCORE: 1.58

## 2025-03-18 NOTE — PROGRESS NOTES
"   VASCULAR FOLLOW UP VISIT  Subjective:   Juan Luis Rolon is a 65 y.o. female who presents 03/18/25  for vascular follow-up     Subjective     HPI    Carotid disease:  S/p Right ICA PCI, with thrombectomy 8/12/2023 after stroke like symptoms experienced while at home.    Previous history of left carotid occlusion due to presumed dissection  Perhaps some balance issues as her only residual  No recurrent TIA or stroke symptoms    HTN:  No interval concerns, tolerating meds, no sx.    Home BP log: Mostly 130s/70s  Adherence to current HTN meds: compliant all of the time   No change to her medications  Denies interfering substances  Does get a bit lightheaded when she stands up after sitting for some time but no syncope and not lifestyle limiting    Antithrombotic therapy:   Was started on brilinta s/p thrombectomy and angioplasty of ICA  Developed SOB, and changed over to ASA with Eliquis  Has previous intolerance to clopidogrel  No bleeding  Does have a lot of bruising  Remains on low-dose aspirin and Eliquis  Is status post Watchman     Hyperlipidemia:   Tolerating statin without myalgias  Tolerating addition of ezetimibe  Had blood work done    Social History     Tobacco Use    Smoking status: Never    Smokeless tobacco: Never   Vaping Use    Vaping status: Never Used   Substance Use Topics    Alcohol use: Yes     Alcohol/week: 3.5 oz     Types: 7 Glasses of wine per week     Comment: 2 per day    Drug use: No     DIET AND EXERCISE:  Weight Change: down about 5 pounds and maintained  Diet: Mediterranean style  Exercise: moderate regular exercise program-mostly cross Fit - less able to do so since shoulder injury         Objective     Vitals:    03/18/25 1448 03/18/25 1452   BP: (!) 146/81 133/86   BP Location: Left arm Left arm   Patient Position: Sitting Sitting   BP Cuff Size: Adult Adult   Pulse: 73 73   Weight: 75.3 kg (166 lb)    Height: 1.676 m (5' 6\")      Wt Readings from Last 5 Encounters:   03/18/25 75.3 " "kg (166 lb)   09/27/24 74.8 kg (165 lb)   06/18/24 74.8 kg (165 lb)   03/12/24 76.2 kg (168 lb)   09/27/23 76.7 kg (169 lb)          BP Readings from Last 5 Encounters:   03/18/25 133/86   09/27/24 119/77   06/18/24 138/87   03/12/24 119/77   09/27/23 126/86      Body mass index is 26.79 kg/m².  Physical Exam  Vitals reviewed.   Constitutional:       General: She is not in acute distress.     Appearance: She is well-developed. She is not diaphoretic.   HENT:      Head: Normocephalic and atraumatic.   Eyes:      General: No scleral icterus.     Conjunctiva/sclera: Conjunctivae normal.      Pupils: Pupils are equal, round, and reactive to light.   Neck:      Thyroid: No thyromegaly.      Vascular: No carotid bruit or JVD.   Cardiovascular:      Rate and Rhythm: Normal rate and regular rhythm.      Heart sounds: Normal heart sounds. No murmur heard.  Pulmonary:      Effort: Pulmonary effort is normal. No respiratory distress.      Breath sounds: Normal breath sounds. No wheezing or rales.   Musculoskeletal:         General: Normal range of motion.      Right lower leg: No edema.      Left lower leg: No edema.   Skin:     General: Skin is warm and dry.      Coloration: Skin is not pale.   Neurological:      General: No focal deficit present.      Mental Status: She is alert and oriented to person, place, and time.      Cranial Nerves: No cranial nerve deficit.      Coordination: Coordination normal.      Gait: Gait normal.      Deep Tendon Reflexes: Reflexes are normal and symmetric.   Psychiatric:         Mood and Affect: Mood normal.         Behavior: Behavior normal.       DATA REVIEW      No results found for: \"CHOLSTRLTOT\", \"LDL\", \"HDL\", \"TRIGLYCERIDE\"    Lab Results   Component Value Date/Time    SODIUM 139 08/14/2023 04:35 AM    POTASSIUM 4.2 08/14/2023 04:35 AM    CHLORIDE 107 08/14/2023 04:35 AM    CO2 23 08/14/2023 04:35 AM    GLUCOSE 109 (H) 08/14/2023 04:35 AM    BUN 6 (L) 08/14/2023 04:35 AM    CREATININE " "0.70 08/14/2023 04:35 AM     Lab Results   Component Value Date/Time    ALKPHOSPHAT 54 08/13/2023 03:20 AM    ASTSGOT 30 08/13/2023 03:20 AM    ALTSGPT 24 08/13/2023 03:20 AM    TBILIRUBIN 0.6 08/13/2023 03:20 AM       No results found for: \"LIPOPROTA\"   No results found for: \"APOB\"    Lab Results   Component Value Date    HBA1C 5.8 (H) 08/12/2023    No results found for: \"MICROALBCALC\", \"MALBCRT\", \"MALBEXCR\", \"MKLNYU59\", \"MICROALBUR\", \"MICRALB\", \"UMICROALBUM\", \"MICROALBTIM\"     Blood Work 10/9/19:  Na -133, K+ 4.5, cret-0.76, bs-114, ALT 25, AST32, GFR >90  No lipid's done   Lab work 3/3/20:  Chol-T 192, trig-98, HDL-62 STR788, Non-HDL-130    Blood work 7/13/0202  Na 138, K+ 4.1 cret 0.76, , ast 21, alt 21, GFR 84, CHO-Y 223, Trig 197, HDL 67,  Non     Blood work 10/15/2020 ([facility])  TC: 170, Trig 99, LDL 92, nonHDL 112    [facility] 3/17/22         Labs [facility] 8/2023  Total cholest 123, TG 75, HDL 60, LDL 48, non-HDL 63,     Blood work January 2024  Lipoprotein a 186 nmol/L    Blood work April 2024  A1c 6.1  Albumin creatinine ratio 237  GFR 90  Glucose 184, triglycerides 113, , LDL 61    Blood work from [facility] Aug 2024  Na 132, k 4, gfr >90  , tirg 105, HDL 93, LDL 29, nonHDL 60  Alb/creat ratio in urine 37    Blood work March 2025  Total cholesterol 171, triglycerides 133, HDL 96, LDL 48, non-HDL 75  Glucose 111  GFR greater than 90      Lab Results   Component Value Date    SODIUM 139 08/14/2023    POTASSIUM 4.2 08/14/2023    CHLORIDE 107 08/14/2023    CO2 23 08/14/2023    GLUCOSE 109 (H) 08/14/2023    BUN 6 (L) 08/14/2023    CREATININE 0.70 08/14/2023        Lab Results   Component Value Date    WBC 5.1 01/02/2024    WBC 5.0 08/14/2023    RBC 4.41 01/02/2024    RBC 3.71 (L) 08/14/2023    HEMOGLOBIN 13.8 01/02/2024    HEMOGLOBIN 12.0 08/14/2023    HEMATOCRIT 41.3 01/02/2024    HEMATOCRIT 34.8 (L) 08/14/2023    MCV 93.7 01/02/2024    MCV 93.8 08/14/2023    MCH " 31.3 01/02/2024    MCH 32.3 08/14/2023    MCHC 33.4 01/02/2024    MCHC 34.5 08/14/2023    MPV 9.6 01/02/2024    MPV 9.0 08/14/2023      IMAGING:     Carotid Duplex 12-5-2014  1. Chronic occlusion of the left internal carotid artery at its origin. Patent left vertebral artery.  2. Minimal heterogeneous plaque in the right carotid bulb and proximal right internal carotid artery. No right internal carotid artery rate limiting stenosis.    Carotid duplex 8-  CONCLUSIONS  1. Normal right carotid duplex examination.   2. Left internal carotid occlusion.   3. Normal bilateral subclavian and vertebral arterial exam.    CTA Oct 2015:   1. Left internal carotid artery occlusion  2. CTA [place] otherwise within normal limits  3. Probable left deep gray matter venous angioma, incidental  4. Underlying white matter changes are likely from previous small vessel ischemia/infarct    CTA October 2016  Persistent occlusion left internal carotid artery  Inferior left basal ganglia and medial left temporal lobe venous angioma  Small vessel disease    Carotid Duplex 10/16/17:   Occlusion of the left internal carotid artery.   No significant stenosis of the right carotid or bilateral subclavian or    bilateral vertebral arteries.    Carotid duplex oct 2019:   Normal right carotid exam.    Occlusion of the left internal carotid.    Compared to the prior study of 10/3/18 - there has been no significant    change    Carotid 10/2021     The bilateral subclavian and vertebral artery waveforms are antegrade and    normal in character and velocity.   Known occlusion of the LEFT internal carotid artery.     Normal right carotid exam.     EKG 1/3/2023- in office.  Sinus corby (HR 58), probable L atrial enlargement, probable anterior infarct, old    Echo 1/2023  CONCLUSIONS  No prior study is available for comparison.   Normal left ventricular systolic function.   No evidence of valvular abnormality based on Doppler evaluation.    Right ventricular systolic pressure is estimated to be 35 mmHg.    ELIANE June 2023   normal LV function.  Watchman device in place in the L atrial   appendage.  No thrombus is present on the Watchman device.      ELIANE July 2023  Compared to the images of the prior study on 6/23/2023, no significant   changes.  Normal left ventricular size and systolic function.  Well-seated Watchman device with peridevice leak measuring 2.8 mm.    CTA head August 2023  RIGHT M1 occlusion     CTA neck August 2023  1.  Occlusion or severe occlusion of the RIGHT internal carotid artery.  2.  Occlusion of the LEFT internal carotid artery.    Neuro IR procedure August 2023  1.  Severe stenosis of cavernous segment of the right internal carotid artery likely representing acute plaque rupture.  2.  Successful angioplasty of the atherosclerotic stenosis with moderate improvement.  3.  Residual thrombus and occlusion in the right M2 segment  4.  Nonflow limiting residual thrombus in the distal right A1 segment.    Echo August 2023  Prior study on 07/25/2023, compared to the report of the prior study,   there has been no significant change.   Limited echocardiogram.  Normal left ventricular systolic function.  The left ventricular ejection fraction is visually estimated to be 55%.  No evidence of right to left shunt by agitated saline study.  A definite cardiac source for a systemic thromboembolic event is not   identified, failure to do so does not exclude its presence. If   clinically indicated, a transesophageal study would be useful.     MR brain August 2023  Acute infarcts involving the right inferolateral frontal region, right basal ganglia and right frontal parietal cortex and subcortical region. Additional small scattered areas of acute infarction are noted in the left frontoparietal deep white matter.  Age-related volume loss and chronic microvascular ischemic changes.    Carotid duplex September 2023   Mild stenosis of the right  internal carotid artery (<50%).    Occlusion of the left internal carotid artery.    Compared to the images of the prior study on 10/1/2021 - there has been no    significant change.     CTA neck march 2024  1.  Recanalization of the right internal carotid artery which is now opacified.  2.  Occluded left internal carotid artery similar to previous.  3.  Vertebral arteries are opacified and patent.  4.  Maxillary and ethmoid sinus mucosal thickening consistent with inflammatory changes.    CTA abdomen june 2024  1.  No abdominal aortic aneurysm or dissection.  2.  No renal artery or visceral artery aneurysm.  3.  Diverticula colon without evidence of diverticulitis. No free fluid.  4.  Radiopaque material within the appendix with no evidence of appendicolith. No evidence of acute appendicitis.    CTA head March 2025  Multiple remote infarcts as described above.  Reconstitution of the supraclinoid left ICA at the level of the ophthalmic artery but the left intracranial ICA remains small in size.  High-grade stenosis of the right ICA at the cavernous-supraclinoid junction. This was also seen on the catheter angiogram from 8/12/2023.    CTA neck March 2025  Left ICA occlusion.  Changes of fibromuscular dysplasia in the suboccipital segment of the right vertebral artery, stable.         Medical Decision Making:  Today's Assessment / Status / Plan:     1. Essential hypertension  Comp Metabolic Panel    MICROALBUMIN CREAT RATIO URINE      2. Dyslipidemia  APOLIPOPROTEIN B    Comp Metabolic Panel    Lipid Profile    TSH      3. Atrial fibrillation, unspecified type (HCC)        4. Acute ischemic stroke (HCC)        5. Hypercoagulable state (HCC)        6. IFG (impaired fasting glucose)  HEMOGLOBIN A1C          Patient Type: Secondary Prevention    Etiology of Established CVD if Present:     1.  Complex cerebrovascular disease with distant history of left carotid occlusion presumed to be due to dissection with distal right  carotid occlusion due to suspected ruptured atherosclerotic plaque (although hard to rule out underlying dissection) with multifocal stroke in August 2023 status post thrombectomy and angioplasty.   Although never has had distinct radiologic features, I think is likely there is a component of FMD in addition to atherosclerosis  No residual neurologic deficits  No recurrent TIA or stroke symptoms  Previous workup for fibromuscular disease in other vascular beds or vasculitis was unremarkable, including CTA abd in 2024  Plan:  -Continue medical management and lifestyle changes per below  -Repeat CTA head and neck in March 2026  -911 with any recurrent TIA or stroke symptoms    2.  Atrial fibrillation status post ablation and Watchman in 2020 -defer management of rhythm and rate to cardiology  -Antiplatelet/anticoagulation as per below    Lipid Management:   High lipoprotein a (186 nmol/L in January 2024 and 169 nm/liter in 2021) increases risk of recurrence  Goal LDL less than 55 -good control with addition of ned  Goal nonHDL <85 - good control  Goal apolipoprotein B less than 60 - unknown-   Plan:  -Continue rosuvastatin 20 mg daily  -continue  ezetimibe 10 mg a day  -Recheck fast lipid panel and apolipoprotein B prior to next visit    Blood Pressure Management:  ACC/AHA BP goal <130/80  Has history of whitecoat hypertension  Will allow a little bit of permissive hypertension given her complex cerebrovascular disease  She does have a bit of stable orthostasis, not lifestyle limiting  Overall I think her control is reasonable for the current clinical situation both at home and in the office  No secondary cause identified  Did not tolerate Diovan due to fogginess and dizziness - tolerates low dose losartan  Had leg swelling on higher doses of Norvasc  She has done well with low-dose combination therapy although it does increase her pill count  No significant albuminuria in 2024  Plan:  - Continue atenolol 50mg  daily  - Continue amlodipine 2.5mg daily  - Continue spironolactone/ hydrochlorothiazide one half pill daily  - Continue terazosin 1mg qhs  - Continue losartan 25mg daily  -Continue home blood pressure monitoring    Glycemic Status: History of IFG  Most recent A1c 6.1 -relatively stable  Fasting glucose not significantly changed from previous  -Continue lifestyle modification  -Recheck fasting glucose and A1c prior to next visit    Anti-Platelet/Anti-Coagulant Tx: yes  As we discussed, difficult to determine appropriate combination of anticoagulant and antiplatelet drugs in the setting  Has had shortness of breath on Brilinta  Did not tolerate clopidogrel in the past  Tolerating current therapy without bleeding  - Continue Eliquis 5mg BID  -Continue concomitant low-dose aspirin 81 mg a day   - Report any bleeding immediately     LIFESTYLE INTERVENTIONS:    SMOKING:   reports that she has never smoked. She has never used smokeless tobacco.   - continued complete avoidance of all tobacco products     PHYSICAL ACTIVITY: Continue regular CrossFit    WEIGHT MANAGEMENT AND NUTRITION: Dietary plan was discussed with patient at this visit including Mediter[place]an diet.  Maintain weight loss    Other:    # shoulder injury - she is contemplating shoulder replacement. As we discussed this would necessitate holding anticoagulant/antiplatelet agent. Would be a risk of stroke that is difficult to quantify.  Sounds like using shared decision making with orthopedic surgery she is elected a conservative approach.  Defer further management to Ortho      Instructed to follow-up with PCP for remainder of adult medical needs: yes  We will partner with other providers in the management of established vascular disease and cardiometabolic risk factors.     Studies to Be Obtained: CTA head and neck March 2026   Labs to Be Obtained: As above prior to next visit     Follow up in: 6 months    Michael J Bloch, M.D.  [facility]    [facility]   [phone]     Cc:  PRINCESS Samuels

## 2025-05-06 DIAGNOSIS — I48.91 ATRIAL FIBRILLATION, UNSPECIFIED TYPE (HCC): ICD-10-CM

## 2025-05-07 RX ORDER — APIXABAN 5 MG/1
5 TABLET, FILM COATED ORAL 2 TIMES DAILY
Qty: 200 TABLET | Refills: 3 | Status: SHIPPED | OUTPATIENT
Start: 2025-05-07

## 2025-07-09 DIAGNOSIS — E78.5 DYSLIPIDEMIA: ICD-10-CM

## 2025-07-09 RX ORDER — EZETIMIBE 10 MG/1
10 TABLET ORAL DAILY
Qty: 100 TABLET | Refills: 3 | Status: SHIPPED | OUTPATIENT
Start: 2025-07-09